# Patient Record
Sex: FEMALE | Race: WHITE | NOT HISPANIC OR LATINO | Employment: OTHER | URBAN - METROPOLITAN AREA
[De-identification: names, ages, dates, MRNs, and addresses within clinical notes are randomized per-mention and may not be internally consistent; named-entity substitution may affect disease eponyms.]

---

## 2024-02-27 ENCOUNTER — APPOINTMENT (EMERGENCY)
Dept: CT IMAGING | Facility: HOSPITAL | Age: 69
DRG: 394 | End: 2024-02-27
Payer: MEDICARE

## 2024-02-27 ENCOUNTER — HOSPITAL ENCOUNTER (INPATIENT)
Facility: HOSPITAL | Age: 69
LOS: 3 days | Discharge: HOME | DRG: 394 | End: 2024-03-01
Attending: STUDENT IN AN ORGANIZED HEALTH CARE EDUCATION/TRAINING PROGRAM | Admitting: SURGERY
Payer: MEDICARE

## 2024-02-27 DIAGNOSIS — I10 PRIMARY HYPERTENSION: Primary | ICD-10-CM

## 2024-02-27 DIAGNOSIS — E66.01 MORBID OBESITY (HCC): ICD-10-CM

## 2024-02-27 DIAGNOSIS — K56.609 SMALL BOWEL OBSTRUCTION (HCC): ICD-10-CM

## 2024-02-27 DIAGNOSIS — R10.9 ABDOMINAL PAIN: ICD-10-CM

## 2024-02-27 DIAGNOSIS — I48.0 PAROXYSMAL A-FIB (HCC): ICD-10-CM

## 2024-02-27 DIAGNOSIS — I10 HYPERTENSION, UNSPECIFIED TYPE: ICD-10-CM

## 2024-02-27 PROBLEM — K43.0 INCARCERATED INCISIONAL HERNIA: Status: ACTIVE | Noted: 2024-02-27

## 2024-02-27 LAB
ALBUMIN SERPL BCP-MCNC: 4.4 G/DL (ref 3.5–5)
ALP SERPL-CCNC: 114 U/L (ref 34–104)
ALT SERPL W P-5'-P-CCNC: 26 U/L (ref 7–52)
ANION GAP SERPL CALCULATED.3IONS-SCNC: 12 MMOL/L
AST SERPL W P-5'-P-CCNC: 25 U/L (ref 13–39)
BACTERIA UR QL AUTO: ABNORMAL /HPF
BASOPHILS # BLD AUTO: 0.03 THOUSANDS/ÂΜL (ref 0–0.1)
BASOPHILS NFR BLD AUTO: 0 % (ref 0–1)
BILIRUB SERPL-MCNC: 0.56 MG/DL (ref 0.2–1)
BILIRUB UR QL STRIP: NEGATIVE
BUN SERPL-MCNC: 16 MG/DL (ref 5–25)
CALCIUM SERPL-MCNC: 9.7 MG/DL (ref 8.4–10.2)
CHLORIDE SERPL-SCNC: 101 MMOL/L (ref 96–108)
CLARITY UR: CLEAR
CO2 SERPL-SCNC: 24 MMOL/L (ref 21–32)
COLOR UR: ABNORMAL
CREAT SERPL-MCNC: 0.84 MG/DL (ref 0.6–1.3)
EOSINOPHIL # BLD AUTO: 0 THOUSAND/ÂΜL (ref 0–0.61)
EOSINOPHIL NFR BLD AUTO: 0 % (ref 0–6)
ERYTHROCYTE [DISTWIDTH] IN BLOOD BY AUTOMATED COUNT: 12 % (ref 11.6–15.1)
GFR SERPL CREATININE-BSD FRML MDRD: 71 ML/MIN/1.73SQ M
GLUCOSE SERPL-MCNC: 180 MG/DL (ref 65–140)
GLUCOSE UR STRIP-MCNC: NEGATIVE MG/DL
HCT VFR BLD AUTO: 48.7 % (ref 34.8–46.1)
HGB BLD-MCNC: 16.6 G/DL (ref 11.5–15.4)
HGB UR QL STRIP.AUTO: ABNORMAL
IMM GRANULOCYTES # BLD AUTO: 0.07 THOUSAND/UL (ref 0–0.2)
IMM GRANULOCYTES NFR BLD AUTO: 1 % (ref 0–2)
KETONES UR STRIP-MCNC: NEGATIVE MG/DL
LEUKOCYTE ESTERASE UR QL STRIP: NEGATIVE
LIPASE SERPL-CCNC: 9 U/L (ref 11–82)
LYMPHOCYTES # BLD AUTO: 1.4 THOUSANDS/ÂΜL (ref 0.6–4.47)
LYMPHOCYTES NFR BLD AUTO: 11 % (ref 14–44)
MCH RBC QN AUTO: 31.1 PG (ref 26.8–34.3)
MCHC RBC AUTO-ENTMCNC: 34.1 G/DL (ref 31.4–37.4)
MCV RBC AUTO: 91 FL (ref 82–98)
MONOCYTES # BLD AUTO: 0.32 THOUSAND/ÂΜL (ref 0.17–1.22)
MONOCYTES NFR BLD AUTO: 3 % (ref 4–12)
MUCOUS THREADS UR QL AUTO: ABNORMAL
NEUTROPHILS # BLD AUTO: 10.59 THOUSANDS/ÂΜL (ref 1.85–7.62)
NEUTS SEG NFR BLD AUTO: 85 % (ref 43–75)
NITRITE UR QL STRIP: NEGATIVE
NON-SQ EPI CELLS URNS QL MICRO: ABNORMAL /HPF
NRBC BLD AUTO-RTO: 0 /100 WBCS
PH UR STRIP.AUTO: 7 [PH]
PLATELET # BLD AUTO: 254 THOUSANDS/UL (ref 149–390)
PMV BLD AUTO: 10.1 FL (ref 8.9–12.7)
POTASSIUM SERPL-SCNC: 4 MMOL/L (ref 3.5–5.3)
PROT SERPL-MCNC: 8.2 G/DL (ref 6.4–8.4)
PROT UR STRIP-MCNC: ABNORMAL MG/DL
RBC # BLD AUTO: 5.34 MILLION/UL (ref 3.81–5.12)
RBC #/AREA URNS AUTO: ABNORMAL /HPF
SODIUM SERPL-SCNC: 137 MMOL/L (ref 135–147)
SP GR UR STRIP.AUTO: >=1.05 (ref 1–1.03)
UROBILINOGEN UR STRIP-ACNC: <2 MG/DL
WBC # BLD AUTO: 12.41 THOUSAND/UL (ref 4.31–10.16)
WBC #/AREA URNS AUTO: ABNORMAL /HPF

## 2024-02-27 PROCEDURE — 96374 THER/PROPH/DIAG INJ IV PUSH: CPT

## 2024-02-27 PROCEDURE — 74177 CT ABD & PELVIS W/CONTRAST: CPT

## 2024-02-27 PROCEDURE — 84134 ASSAY OF PREALBUMIN: CPT | Performed by: SURGERY

## 2024-02-27 PROCEDURE — 99285 EMERGENCY DEPT VISIT HI MDM: CPT | Performed by: STUDENT IN AN ORGANIZED HEALTH CARE EDUCATION/TRAINING PROGRAM

## 2024-02-27 PROCEDURE — 96361 HYDRATE IV INFUSION ADD-ON: CPT

## 2024-02-27 PROCEDURE — 99284 EMERGENCY DEPT VISIT MOD MDM: CPT

## 2024-02-27 PROCEDURE — 85025 COMPLETE CBC W/AUTO DIFF WBC: CPT | Performed by: STUDENT IN AN ORGANIZED HEALTH CARE EDUCATION/TRAINING PROGRAM

## 2024-02-27 PROCEDURE — 80053 COMPREHEN METABOLIC PANEL: CPT | Performed by: STUDENT IN AN ORGANIZED HEALTH CARE EDUCATION/TRAINING PROGRAM

## 2024-02-27 PROCEDURE — 83690 ASSAY OF LIPASE: CPT | Performed by: STUDENT IN AN ORGANIZED HEALTH CARE EDUCATION/TRAINING PROGRAM

## 2024-02-27 PROCEDURE — 81001 URINALYSIS AUTO W/SCOPE: CPT | Performed by: STUDENT IN AN ORGANIZED HEALTH CARE EDUCATION/TRAINING PROGRAM

## 2024-02-27 PROCEDURE — 93005 ELECTROCARDIOGRAM TRACING: CPT

## 2024-02-27 PROCEDURE — 36415 COLL VENOUS BLD VENIPUNCTURE: CPT

## 2024-02-27 PROCEDURE — 83036 HEMOGLOBIN GLYCOSYLATED A1C: CPT | Performed by: SURGERY

## 2024-02-27 RX ORDER — ONDANSETRON 4 MG/1
4 TABLET, ORALLY DISINTEGRATING ORAL ONCE
Status: COMPLETED | OUTPATIENT
Start: 2024-02-27 | End: 2024-02-27

## 2024-02-27 RX ORDER — CEFAZOLIN SODIUM 2 G/50ML
2000 SOLUTION INTRAVENOUS EVERY 8 HOURS
Qty: 50 ML | Refills: 0 | Status: COMPLETED | OUTPATIENT
Start: 2024-02-28 | End: 2024-02-28

## 2024-02-27 RX ORDER — ONDANSETRON 2 MG/ML
4 INJECTION INTRAMUSCULAR; INTRAVENOUS EVERY 6 HOURS PRN
Status: DISCONTINUED | OUTPATIENT
Start: 2024-02-27 | End: 2024-03-01 | Stop reason: HOSPADM

## 2024-02-27 RX ORDER — MORPHINE SULFATE 4 MG/ML
4 INJECTION, SOLUTION INTRAMUSCULAR; INTRAVENOUS ONCE
Status: COMPLETED | OUTPATIENT
Start: 2024-02-27 | End: 2024-02-27

## 2024-02-27 RX ORDER — SODIUM CHLORIDE, SODIUM LACTATE, POTASSIUM CHLORIDE, CALCIUM CHLORIDE 600; 310; 30; 20 MG/100ML; MG/100ML; MG/100ML; MG/100ML
125 INJECTION, SOLUTION INTRAVENOUS CONTINUOUS
Status: DISCONTINUED | OUTPATIENT
Start: 2024-02-27 | End: 2024-02-29

## 2024-02-27 RX ORDER — HEPARIN SODIUM 5000 [USP'U]/ML
7500 INJECTION, SOLUTION INTRAVENOUS; SUBCUTANEOUS EVERY 8 HOURS SCHEDULED
Status: DISCONTINUED | OUTPATIENT
Start: 2024-02-28 | End: 2024-03-01 | Stop reason: HOSPADM

## 2024-02-27 RX ORDER — PANTOPRAZOLE SODIUM 40 MG/10ML
40 INJECTION, POWDER, LYOPHILIZED, FOR SOLUTION INTRAVENOUS
Status: DISCONTINUED | OUTPATIENT
Start: 2024-02-28 | End: 2024-03-01 | Stop reason: HOSPADM

## 2024-02-27 RX ADMIN — SODIUM CHLORIDE 1000 ML: 0.9 INJECTION, SOLUTION INTRAVENOUS at 22:15

## 2024-02-27 RX ADMIN — SODIUM CHLORIDE 1000 ML: 0.9 INJECTION, SOLUTION INTRAVENOUS at 18:17

## 2024-02-27 RX ADMIN — MORPHINE SULFATE 2 MG: 2 INJECTION, SOLUTION INTRAMUSCULAR; INTRAVENOUS at 21:52

## 2024-02-27 RX ADMIN — IOHEXOL 100 ML: 350 INJECTION, SOLUTION INTRAVENOUS at 19:03

## 2024-02-27 RX ADMIN — ONDANSETRON 4 MG: 4 TABLET, ORALLY DISINTEGRATING ORAL at 16:30

## 2024-02-27 RX ADMIN — MORPHINE SULFATE 4 MG: 4 INJECTION INTRAVENOUS at 18:16

## 2024-02-27 RX ADMIN — SODIUM CHLORIDE, SODIUM LACTATE, POTASSIUM CHLORIDE, AND CALCIUM CHLORIDE 125 ML/HR: .6; .31; .03; .02 INJECTION, SOLUTION INTRAVENOUS at 22:53

## 2024-02-28 PROBLEM — I10 HYPERTENSION: Status: ACTIVE | Noted: 2024-02-28

## 2024-02-28 LAB
ALBUMIN SERPL BCP-MCNC: 3.9 G/DL (ref 3.5–5)
ALP SERPL-CCNC: 99 U/L (ref 34–104)
ALT SERPL W P-5'-P-CCNC: 21 U/L (ref 7–52)
ANION GAP SERPL CALCULATED.3IONS-SCNC: 9 MMOL/L
AST SERPL W P-5'-P-CCNC: 18 U/L (ref 13–39)
BASOPHILS # BLD AUTO: 0 THOUSANDS/ÂΜL (ref 0–0.1)
BASOPHILS NFR BLD AUTO: 0 % (ref 0–1)
BILIRUB SERPL-MCNC: 0.46 MG/DL (ref 0.2–1)
BUN SERPL-MCNC: 15 MG/DL (ref 5–25)
CALCIUM SERPL-MCNC: 8.8 MG/DL (ref 8.4–10.2)
CHLORIDE SERPL-SCNC: 103 MMOL/L (ref 96–108)
CO2 SERPL-SCNC: 25 MMOL/L (ref 21–32)
CREAT SERPL-MCNC: 0.79 MG/DL (ref 0.6–1.3)
EOSINOPHIL # BLD AUTO: 0 THOUSAND/ÂΜL (ref 0–0.61)
EOSINOPHIL NFR BLD AUTO: 0 % (ref 0–6)
ERYTHROCYTE [DISTWIDTH] IN BLOOD BY AUTOMATED COUNT: 12.2 % (ref 11.6–15.1)
EST. AVERAGE GLUCOSE BLD GHB EST-MCNC: 117 MG/DL
GFR SERPL CREATININE-BSD FRML MDRD: 76 ML/MIN/1.73SQ M
GLUCOSE SERPL-MCNC: 137 MG/DL (ref 65–140)
HBA1C MFR BLD: 5.7 %
HCT VFR BLD AUTO: 44.8 % (ref 34.8–46.1)
HGB BLD-MCNC: 15.2 G/DL (ref 11.5–15.4)
IMM GRANULOCYTES # BLD AUTO: 0.04 THOUSAND/UL (ref 0–0.2)
IMM GRANULOCYTES NFR BLD AUTO: 0 % (ref 0–2)
LYMPHOCYTES # BLD AUTO: 1.14 THOUSANDS/ÂΜL (ref 0.6–4.47)
LYMPHOCYTES NFR BLD AUTO: 11 % (ref 14–44)
MAGNESIUM SERPL-MCNC: 2.1 MG/DL (ref 1.9–2.7)
MCH RBC QN AUTO: 31.1 PG (ref 26.8–34.3)
MCHC RBC AUTO-ENTMCNC: 33.9 G/DL (ref 31.4–37.4)
MCV RBC AUTO: 92 FL (ref 82–98)
MONOCYTES # BLD AUTO: 0.41 THOUSAND/ÂΜL (ref 0.17–1.22)
MONOCYTES NFR BLD AUTO: 4 % (ref 4–12)
NEUTROPHILS # BLD AUTO: 9.23 THOUSANDS/ÂΜL (ref 1.85–7.62)
NEUTS SEG NFR BLD AUTO: 85 % (ref 43–75)
NRBC BLD AUTO-RTO: 0 /100 WBCS
PHOSPHATE SERPL-MCNC: 4.2 MG/DL (ref 2.3–4.1)
PLATELET # BLD AUTO: 230 THOUSANDS/UL (ref 149–390)
PMV BLD AUTO: 10.2 FL (ref 8.9–12.7)
POTASSIUM SERPL-SCNC: 4.1 MMOL/L (ref 3.5–5.3)
PREALB SERPL-MCNC: 22.9 MG/DL (ref 17–34)
PROT SERPL-MCNC: 7.2 G/DL (ref 6.4–8.4)
RBC # BLD AUTO: 4.89 MILLION/UL (ref 3.81–5.12)
SODIUM SERPL-SCNC: 137 MMOL/L (ref 135–147)
WBC # BLD AUTO: 10.82 THOUSAND/UL (ref 4.31–10.16)

## 2024-02-28 PROCEDURE — 93005 ELECTROCARDIOGRAM TRACING: CPT

## 2024-02-28 PROCEDURE — 84100 ASSAY OF PHOSPHORUS: CPT | Performed by: SURGERY

## 2024-02-28 PROCEDURE — 80053 COMPREHEN METABOLIC PANEL: CPT | Performed by: SURGERY

## 2024-02-28 PROCEDURE — 87040 BLOOD CULTURE FOR BACTERIA: CPT | Performed by: FAMILY MEDICINE

## 2024-02-28 PROCEDURE — 85025 COMPLETE CBC W/AUTO DIFF WBC: CPT | Performed by: SURGERY

## 2024-02-28 PROCEDURE — 99222 1ST HOSP IP/OBS MODERATE 55: CPT | Performed by: FAMILY MEDICINE

## 2024-02-28 PROCEDURE — 83735 ASSAY OF MAGNESIUM: CPT | Performed by: SURGERY

## 2024-02-28 PROCEDURE — 36415 COLL VENOUS BLD VENIPUNCTURE: CPT | Performed by: FAMILY MEDICINE

## 2024-02-28 PROCEDURE — C9113 INJ PANTOPRAZOLE SODIUM, VIA: HCPCS | Performed by: SURGERY

## 2024-02-28 RX ORDER — HYDRALAZINE HYDROCHLORIDE 20 MG/ML
5 INJECTION INTRAMUSCULAR; INTRAVENOUS EVERY 6 HOURS PRN
Status: DISCONTINUED | OUTPATIENT
Start: 2024-02-28 | End: 2024-03-01 | Stop reason: HOSPADM

## 2024-02-28 RX ADMIN — PANTOPRAZOLE SODIUM 40 MG: 40 INJECTION, POWDER, FOR SOLUTION INTRAVENOUS at 09:28

## 2024-02-28 RX ADMIN — HEPARIN SODIUM 7500 UNITS: 5000 INJECTION INTRAVENOUS; SUBCUTANEOUS at 06:05

## 2024-02-28 RX ADMIN — HEPARIN SODIUM 7500 UNITS: 5000 INJECTION INTRAVENOUS; SUBCUTANEOUS at 15:48

## 2024-02-28 RX ADMIN — HEPARIN SODIUM 7500 UNITS: 5000 INJECTION INTRAVENOUS; SUBCUTANEOUS at 21:21

## 2024-02-28 RX ADMIN — ONDANSETRON 4 MG: 2 INJECTION INTRAMUSCULAR; INTRAVENOUS at 03:14

## 2024-02-28 RX ADMIN — CEFAZOLIN SODIUM 2000 MG: 2 SOLUTION INTRAVENOUS at 01:08

## 2024-02-28 NOTE — ASSESSMENT & PLAN NOTE
CT A/P: Small bowel obstruction involving multiple loops throughout the abdomen, extending into a large ventral abdominal hernia   Keep NPO  NG tube in place  Continuous IVF  Pain control, anti-emetics  Care per general surgery team

## 2024-02-28 NOTE — PLAN OF CARE
Problem: GASTROINTESTINAL - ADULT  Goal: Minimal or absence of nausea and/or vomiting  Description: INTERVENTIONS:  - Administer IV fluids if ordered to ensure adequate hydration  - Maintain NPO status until nausea and vomiting are resolved  - Nasogastric tube if ordered  - Administer ordered antiemetic medications as needed  - Provide nonpharmacologic comfort measures as appropriate  - Advance diet as tolerated, if ordered  - Consider nutrition services referral to assist patient with adequate nutrition and appropriate food choices  Outcome: Progressing  Goal: Maintains or returns to baseline bowel function  Description: INTERVENTIONS:  - Assess bowel function  - Encourage oral fluids to ensure adequate hydration  - Administer IV fluids if ordered to ensure adequate hydration  - Administer ordered medications as needed  - Encourage mobilization and activity  - Consider nutritional services referral to assist patient with adequate nutrition and appropriate food choices  Outcome: Progressing     Problem: HEMATOLOGIC - ADULT  Goal: Maintains hematologic stability  Description: INTERVENTIONS  - Assess for signs and symptoms of bleeding or hemorrhage  - Monitor labs  - Administer supportive blood products/factors as ordered and appropriate  Outcome: Progressing

## 2024-02-28 NOTE — ASSESSMENT & PLAN NOTE
CT A/P with SBO extending into large ventral hernia  Keep NPO  NG tube in place  S/P IV Ancef  Care per general surgery team

## 2024-02-28 NOTE — ASSESSMENT & PLAN NOTE
BP elevated 170s/90s  No history of hypertension; however, does not follow with a PCP  Start on IV hydralazine 5mg Q6H PRN for SBP>170 while NPO  Will start on oral agent at discharge if BP remains high  Referral for PCP at discharge  Monitor vitals per routine

## 2024-02-28 NOTE — NURSING NOTE
Patients has has 2 medium liquid/soft stools since coming from ED to [facility]. Patient is also passing gas

## 2024-02-28 NOTE — H&P
H&P Exam - General Surgery   Ivonne Marcos 68 y.o. female MRN: [id number]  Unit/Bed#: ED 22 Encounter: [id number]    Assessment/Plan     Assessment:  68-year-old female with large incarcerated ventral hernia    SBO    Hypertension    Morbid obesity    Abnormal HR    Plan:  -Continue n.p.o., IVF, NGT to low remittent wall suction  -Continue to monitor NGT output  -Continue to monitor bowel function  -Will consult cardiology for clearance and abnormal heart rate evaluation  -Given patient's obesity, it would be ideal to plan outpatient repair after weight loss and nutritional optimization      History of Present Illness     HPI:  Ivonne Marcos is a 68 y.o. female with morbid obesity, who presents to the emergency department with complaint of abdominal pain, nausea, vomiting.  Patient states that she has been having intermittent symptoms for the past several months.  Patient has known about ventral hernia that developed shortly after undergoing laparoscopic cholecystectomy 10 years ago.  Patient reports that the hernia to present for a long has only began causing issues recently, with this being the first time she needed to seek medical care.  Patient states that she has a very sedentary lifestyle, poor diet.  Patient reports that hernia fell harder than it typically does, but had no increased hernia tenderness.     Review of Systems   Constitutional:  Positive for appetite change. Negative for activity change, chills and fever.   HENT:  Negative for congestion, sore throat and trouble swallowing.    Eyes:  Negative for redness and visual disturbance.   Respiratory:  Negative for apnea, cough and wheezing.    Cardiovascular:  Negative for chest pain, palpitations and leg swelling.   Gastrointestinal:  Positive for abdominal pain, constipation, nausea and vomiting. Negative for abdominal distention.   Endocrine: Negative for polydipsia, polyphagia and polyuria.   Genitourinary:  Negative for difficulty urinating,  "dysuria, frequency and urgency.   Musculoskeletal:  Negative for arthralgias, back pain and gait problem.   Skin:  Negative for color change, pallor and rash.   Neurological:  Negative for dizziness, seizures, weakness and headaches.   Psychiatric/Behavioral:  Negative for agitation, behavioral problems and confusion.        Historical Information   History reviewed. No pertinent past medical history.  History reviewed. No pertinent surgical history.  Social History   Social History     Substance and Sexual Activity   Alcohol Use Not Currently     Social History     Substance and Sexual Activity   Drug Use Never     Social History     Tobacco Use   Smoking Status Never   Smokeless Tobacco Never     E-Cigarette/Vaping     E-Cigarette/Vaping Substances     Family History: non-contributory    Meds/Allergies   all medications and allergies reviewed  No Known Allergies    Objective   First Vitals:   Blood Pressure: (!) 193/87 (02/27/24 1624)  Pulse: (!) 113 (02/27/24 1624)  Temperature: 97.9 °F (36.6 °C) (02/27/24 1624)  Temp Source: Temporal (02/27/24 1624)  Respirations: 20 (02/27/24 1624)  Height: 5' 4\" (162.6 cm) (02/27/24 2251)  Weight - Scale: (!) 140 kg (308 lb 10.3 oz) (02/27/24 2251)  SpO2: 99 % (02/27/24 1624)    Current Vitals:   Blood Pressure: 158/78 (02/28/24 0930)  Pulse: 99 (02/28/24 0930)  Temperature: 97.9 °F (36.6 °C) (02/27/24 1624)  Temp Source: Temporal (02/27/24 1624)  Respirations: 18 (02/28/24 0930)  Height: 5' 4\" (162.6 cm) (02/27/24 2251)  Weight - Scale: (!) 140 kg (308 lb 10.3 oz) (02/27/24 2251)  SpO2: 96 % (02/28/24 0930)      Intake/Output Summary (Last 24 hours) at 2/28/2024 1042  Last data filed at 2/28/2024 0933  Gross per 24 hour   Intake 400 ml   Output 400 ml   Net 0 ml       Invasive Devices       Peripheral Intravenous Line  Duration             Peripheral IV 02/27/24 Left;Proximal;Ventral (anterior) Forearm <1 day              Drain  Duration             NG/OG/Enteral Tube " Nasogastric 16 Fr Right nare <1 day                    Physical Exam  Constitutional:       Appearance: She is obese.   HENT:      Head: Normocephalic.      Nose: Nose normal.      Mouth/Throat:      Mouth: Mucous membranes are moist.      Pharynx: Oropharynx is clear.   Eyes:      Extraocular Movements: Extraocular movements intact.      Pupils: Pupils are equal, round, and reactive to light.   Cardiovascular:      Rate and Rhythm: Tachycardia present. Rhythm irregular.   Pulmonary:      [place]: Pulmonary effort is normal.      Breath sounds: Normal breath sounds.   Abdominal:      General: Abdomen is flat. Bowel sounds are normal. There is distension.      Tenderness: There is no abdominal tenderness. There is no guarding.   Musculoskeletal:         General: No swelling or deformity. Normal range of motion.      Cervical back: Normal range of motion.      Right lower leg: No edema.   Skin:     General: Skin is warm.      Capillary Refill: Capillary refill takes less than 2 seconds.      Coloration: Skin is not jaundiced.      Findings: No bruising or lesion.   Neurological:      General: No focal deficit present.      Mental Status: She is alert and oriented to person, place, and time. Mental status is at baseline.   Psychiatric:         Mood and Affect: Mood normal.         Thought Content: Thought content normal.         Judgment: Judgment normal.         Lab Results: CBC:   Lab Results   Component Value Date    WBC 10.82 (H) 02/28/2024    HGB 15.2 02/28/2024    HCT 44.8 02/28/2024    MCV 92 02/28/2024     02/28/2024    RBC 4.89 02/28/2024    MCH 31.1 02/28/2024    MCHC 33.9 02/28/2024    RDW 12.2 02/28/2024    MPV 10.2 02/28/2024    NRBC 0 02/28/2024   , CMP:   Lab Results   Component Value Date    SODIUM 137 02/28/2024    K 4.1 02/28/2024     02/28/2024    CO2 25 02/28/2024    BUN 15 02/28/2024    CREATININE 0.79 02/28/2024    CALCIUM 8.8 02/28/2024    AST 18 02/28/2024    ALT 21 02/28/2024     ALKPHOS 99 02/28/2024    EGFR 76 02/28/2024   , Lipase:   Lab Results   Component Value Date    LIPASE 9 (L) 02/27/2024     Imaging: I have personally reviewed pertinent reports.   and I have personally reviewed pertinent films in PACS  EKG, Pathology, and Other Studies: I have personally reviewed pertinent reports.   and I have personally reviewed pertinent films in PACS    Code Status: Level 1 - Full Code  Advance Directive and Living Will:      Power of :    POLST:      Counseling / Coordination of Care  Total floor / unit time spent today 30 minutes.  Greater than 50% of total time was spent with the patient and / or family counseling and / or coordination of care.  A description of the counseling / coordination of care: Plan of care and expectations.

## 2024-02-28 NOTE — CONSULTS
[facility]  Consult  Name: Ivonne Marcos 68 y.o. female I MRN: [id number]  Unit/Bed#: ED 22 I Date of Admission: 2/27/2024   Date of Service: 2/28/2024 I Hospital Day: 1    Inpatient consult to Internal Medicine  Consult performed by: Nahomy Ellsworth PA-C  Consult ordered by: Jesus Gallo MD          Assessment/Plan   * Incarcerated incisional hernia  Assessment & Plan  CT A/P with SBO extending into large ventral hernia  Keep NPO  NG tube in place  S/P IV Ancef  Care per general surgery team    Small bowel obstruction (HCC)  Assessment & Plan  CT A/P: Small bowel obstruction involving multiple loops throughout the abdomen, extending into a large ventral abdominal hernia   Keep NPO  NG tube in place  Continuous IVF  Pain control, anti-emetics  Care per general surgery team    Hypertension  Assessment & Plan  BP elevated 170s/90s  No history of hypertension; however, does not follow with a PCP  Start on IV hydralazine 5mg Q6H PRN for SBP>170 while NPO  Will start on oral agent at discharge if BP remains high  Referral for PCP at discharge  Monitor vitals per routine    Morbid obesity (HCC)  Assessment & Plan  Current BMI 52.98 kg/m2  Encouraged healthy diet and lifestyle modifications             VTE Prophylaxis: VTE Score: 5 Moderate Risk (Score 3-4) - Pharmacological DVT Prophylaxis Ordered: heparin.    Mobility:      HLM Goal achieved. Continue to encourage appropriate mobility.    Recommendations for Discharge:  Patient with elevated BP during admission. Recommending oral antihypertensive agent and referral for PCP.   Remainder of care per general surgery team    Total Time Spent on Date of Encounter in care of patient: 45 mins. This time was spent on one or more of the following: performing physical exam; counseling and coordination of care; obtaining or reviewing history; documenting in the medical record; reviewing/ordering tests, medications or procedures; communicating with  other healthcare professionals and discussing with patient's family/caregivers.    Collaboration of Care: Were Recommendations Directly Discussed with Primary Treatment Team? Yes    History of Present Illness:  Ivonne Marcos is a 68 y.o. female who is originally admitted to the general surgery service due to SBO and incarcerated hernia. We are consulted for medical management. Patient with no past medical history; however, she does not follow with a PCP. Patient noted to have elevated BP during admission. Patient also with obesity secondary to excessive calorie intake. Patient presented to the ED for abdominal pain with nausea and vomiting. CT with evidence of SBO and hernia and she was admitted to general surgery service. Internal medicine will continue to follow with patient.     Review of Systems:  Review of Systems   Constitutional:  Negative for chills and fever.   HENT:  Negative for ear pain and sore throat.    Eyes:  Negative for pain and visual disturbance.   Respiratory:  Negative for cough and shortness of breath.    Cardiovascular:  Negative for chest pain and palpitations.   Gastrointestinal:  Positive for abdominal pain, nausea and vomiting.   Genitourinary:  Negative for dysuria and hematuria.   Musculoskeletal:  Negative for arthralgias and back pain.   Skin:  Negative for color change and rash.   Neurological:  Negative for seizures and syncope.   All other systems reviewed and are negative.      Past Medical and Surgical History:   History reviewed. No pertinent past medical history.    History reviewed. No pertinent surgical history.    Meds/Allergies:  all medications and allergies reviewed    Allergies: No Known Allergies    Social History:  Marital Status: /[employer]  Substance Use History:   Social History     Substance and Sexual Activity   Alcohol Use Not Currently     Social History     Tobacco Use   Smoking Status Never   Smokeless Tobacco Never     Social History     Substance  "and Sexual Activity   Drug Use Never       Family History:  History reviewed. No pertinent family history.    Physical Exam:   Vitals:   Blood Pressure: (!) 172/79 (02/28/24 0800)  Pulse: 95 (02/28/24 0800)  Temperature: 97.9 °F (36.6 °C) (02/27/24 1624)  Temp Source: Temporal (02/27/24 1624)  Respirations: 18 (02/28/24 0800)  Height: 5' 4\" (162.6 cm) (02/27/24 2251)  Weight - Scale: (!) 140 kg (308 lb 10.3 oz) (02/27/24 2251)  SpO2: 95 % (02/28/24 0800)    Physical Exam  Vitals and nursing note reviewed.   Constitutional:       General: She is not in acute distress.     Appearance: She is well-developed. She is obese.   HENT:      Head: Normocephalic and atraumatic.   Eyes:      Conjunctiva/sclera: Conjunctivae normal.   Cardiovascular:      Rate and Rhythm: Normal rate and regular rhythm.      Heart sounds: No murmur heard.  Pulmonary:      [place]: Pulmonary effort is normal. No respiratory distress.      Breath sounds: Normal breath sounds.   Abdominal:      Palpations: Abdomen is soft.      Tenderness: There is no abdominal tenderness.   Musculoskeletal:         General: No swelling.      Cervical back: Neck supple.   Skin:     General: Skin is warm and dry.      Capillary Refill: Capillary refill takes less than 2 seconds.   Neurological:      Mental Status: She is alert and oriented to person, place, and time.   Psychiatric:         Mood and Affect: Mood normal.          Additional Data:   Lab Results:    Results from last 7 days   Lab Units 02/28/24  0452   WBC Thousand/uL 10.82*   HEMOGLOBIN g/dL 15.2   HEMATOCRIT % 44.8   PLATELETS Thousands/uL 230   NEUTROS PCT % 85*   LYMPHS PCT % 11*   MONOS PCT % 4   EOS PCT % 0     Results from last 7 days   Lab Units 02/28/24  0452   SODIUM mmol/L 137   POTASSIUM mmol/L 4.1   CHLORIDE mmol/L 103   CO2 mmol/L 25   BUN mg/dL 15   CREATININE mg/dL 0.79   ANION GAP mmol/L 9   CALCIUM mg/dL 8.8   ALBUMIN g/dL 3.9   TOTAL BILIRUBIN mg/dL 0.46   ALK PHOS U/L 99   ALT U/L 21 "   AST U/L 18   GLUCOSE RANDOM mg/dL 137             Lab Results   Component Value Date/Time    HGBA1C 5.7 (H) 02/27/2024 09:53 PM               Imaging: Reviewed radiology reports from this admission including: abdominal/pelvic CT  CT abdomen pelvis with contrast   Final Result by Archie Moses DO (02/27 2043)      Small bowel obstruction involving multiple loops throughout the abdomen, extending into a large ventral abdominal hernia. The transition point is located within the hernia sac.         I personally discussed this study with PEEWEE FIELD on 2/27/2024 8:43 PM.               Workstation performed: [employer]             EKG, Pathology, and Other Studies Reviewed on Admission:   EKG interpreted by myself: NSR () with PACs.     ** Please Note: This note may have been constructed using a voice recognition system. **

## 2024-02-29 ENCOUNTER — APPOINTMENT (INPATIENT)
Dept: NON INVASIVE DIAGNOSTICS | Facility: HOSPITAL | Age: 69
DRG: 394 | End: 2024-02-29
Payer: MEDICARE

## 2024-02-29 LAB
ANION GAP SERPL CALCULATED.3IONS-SCNC: 8 MMOL/L
BASOPHILS # BLD AUTO: 0.02 THOUSANDS/ÂΜL (ref 0–0.1)
BASOPHILS NFR BLD AUTO: 0 % (ref 0–1)
BUN SERPL-MCNC: 20 MG/DL (ref 5–25)
CALCIUM SERPL-MCNC: 8.7 MG/DL (ref 8.4–10.2)
CHLORIDE SERPL-SCNC: 108 MMOL/L (ref 96–108)
CO2 SERPL-SCNC: 22 MMOL/L (ref 21–32)
CREAT SERPL-MCNC: 0.78 MG/DL (ref 0.6–1.3)
EOSINOPHIL # BLD AUTO: 0.02 THOUSAND/ÂΜL (ref 0–0.61)
EOSINOPHIL NFR BLD AUTO: 0 % (ref 0–6)
ERYTHROCYTE [DISTWIDTH] IN BLOOD BY AUTOMATED COUNT: 12.4 % (ref 11.6–15.1)
GFR SERPL CREATININE-BSD FRML MDRD: 77 ML/MIN/1.73SQ M
GLUCOSE SERPL-MCNC: 108 MG/DL (ref 65–140)
HCT VFR BLD AUTO: 43.5 % (ref 34.8–46.1)
HGB BLD-MCNC: 14.6 G/DL (ref 11.5–15.4)
IMM GRANULOCYTES # BLD AUTO: 0.03 THOUSAND/UL (ref 0–0.2)
IMM GRANULOCYTES NFR BLD AUTO: 0 % (ref 0–2)
LYMPHOCYTES # BLD AUTO: 1.87 THOUSANDS/ÂΜL (ref 0.6–4.47)
LYMPHOCYTES NFR BLD AUTO: 20 % (ref 14–44)
MCH RBC QN AUTO: 31 PG (ref 26.8–34.3)
MCHC RBC AUTO-ENTMCNC: 33.6 G/DL (ref 31.4–37.4)
MCV RBC AUTO: 92 FL (ref 82–98)
MONOCYTES # BLD AUTO: 0.57 THOUSAND/ÂΜL (ref 0.17–1.22)
MONOCYTES NFR BLD AUTO: 6 % (ref 4–12)
NEUTROPHILS # BLD AUTO: 7.07 THOUSANDS/ÂΜL (ref 1.85–7.62)
NEUTS SEG NFR BLD AUTO: 74 % (ref 43–75)
NRBC BLD AUTO-RTO: 0 /100 WBCS
PLATELET # BLD AUTO: 184 THOUSANDS/UL (ref 149–390)
PMV BLD AUTO: 11.1 FL (ref 8.9–12.7)
POTASSIUM SERPL-SCNC: 3.6 MMOL/L (ref 3.5–5.3)
RBC # BLD AUTO: 4.71 MILLION/UL (ref 3.81–5.12)
SODIUM SERPL-SCNC: 138 MMOL/L (ref 135–147)
WBC # BLD AUTO: 9.58 THOUSAND/UL (ref 4.31–10.16)

## 2024-02-29 PROCEDURE — 99222 1ST HOSP IP/OBS MODERATE 55: CPT | Performed by: INTERNAL MEDICINE

## 2024-02-29 PROCEDURE — 80048 BASIC METABOLIC PNL TOTAL CA: CPT | Performed by: PHYSICIAN ASSISTANT

## 2024-02-29 PROCEDURE — C9113 INJ PANTOPRAZOLE SODIUM, VIA: HCPCS | Performed by: SURGERY

## 2024-02-29 PROCEDURE — 93306 TTE W/DOPPLER COMPLETE: CPT | Performed by: INTERNAL MEDICINE

## 2024-02-29 PROCEDURE — 93306 TTE W/DOPPLER COMPLETE: CPT

## 2024-02-29 PROCEDURE — 99232 SBSQ HOSP IP/OBS MODERATE 35: CPT | Performed by: INTERNAL MEDICINE

## 2024-02-29 PROCEDURE — 85025 COMPLETE CBC W/AUTO DIFF WBC: CPT | Performed by: PHYSICIAN ASSISTANT

## 2024-02-29 RX ORDER — SODIUM CHLORIDE, SODIUM LACTATE, POTASSIUM CHLORIDE, CALCIUM CHLORIDE 600; 310; 30; 20 MG/100ML; MG/100ML; MG/100ML; MG/100ML
75 INJECTION, SOLUTION INTRAVENOUS CONTINUOUS
Status: DISCONTINUED | OUTPATIENT
Start: 2024-02-29 | End: 2024-03-01 | Stop reason: HOSPADM

## 2024-02-29 RX ADMIN — SODIUM CHLORIDE, SODIUM LACTATE, POTASSIUM CHLORIDE, AND CALCIUM CHLORIDE 125 ML/HR: .6; .31; .03; .02 INJECTION, SOLUTION INTRAVENOUS at 04:07

## 2024-02-29 RX ADMIN — HEPARIN SODIUM 7500 UNITS: 5000 INJECTION INTRAVENOUS; SUBCUTANEOUS at 13:11

## 2024-02-29 RX ADMIN — PANTOPRAZOLE SODIUM 40 MG: 40 INJECTION, POWDER, FOR SOLUTION INTRAVENOUS at 11:22

## 2024-02-29 RX ADMIN — HEPARIN SODIUM 7500 UNITS: 5000 INJECTION INTRAVENOUS; SUBCUTANEOUS at 06:37

## 2024-02-29 NOTE — CASE MANAGEMENT
Case Management Assessment & Discharge Planning Note    Patient name Ivonne Marcos  Location /-01 MRN [id number]  : 1955 Date 2024       Current Admission Date: 2024  Current Admission Diagnosis:Incarcerated incisional hernia   Patient Active Problem List    Diagnosis Date Noted    Hypertension 2024    Small bowel obstruction (HCC) 2024    Incarcerated incisional hernia 2024    Morbid obesity (HCC) 2024      LOS (days): 2  Geometric Mean LOS (GMLOS) (days): 3  Days to GMLOS:1.3     OBJECTIVE:    Risk of Unplanned Readmission Score: 8.22         Current admission status: Inpatient       Preferred Pharmacy:   PATIENT/FAMILY REPORTS NO PREFERRED PHARMACY  No address on file      Primary Care Provider: No primary care provider on file.    Primary Insurance: MEDICARE  Secondary Insurance:     ASSESSMENT:  Active Health Care Proxies    There are no active Health Care Proxies on file.                 Readmission Root Cause  30 Day Readmission: No    Patient Information  Admitted from:: Home  Mental Status: Alert  During Assessment patient was accompanied by: Spouse  Assessment information provided by:: Patient, Spouse  Primary Caregiver: Self  Support Systems: Family members  County of Residence: [place]  What city do you live in?: [place]  Home entry access options. Select all that apply.: Stairs  Number of steps to enter home.: 3  Do the steps have railings?: Yes  Type of Current Residence: [facility]  Living Arrangements: Lives w/ Spouse/significant other  Is patient a ?: No ( is )  Is patient active with [facility] ( Affairs)?: No    Activities of Daily Living Prior to Admission  Functional Status: Independent  Completes ADLs independently?: Yes  Ambulates independently?: Yes  Does patient use assisted devices?: No  Does patient currently own DME?: Yes  What DME does the patient currently own?: [employer]  Does patient have a history of  Outpatient Therapy (PT/OT)?: No  Does the patient have a history of Short-Term Rehab?: No  Does patient have a history of HHC?: No  Does patient currently have HHC?: No         Patient Information Continued  Income Source: Pension/intermediate  Does patient have prescription coverage?: Yes  Does patient receive dialysis treatments?: No  Does patient have a history of substance abuse?: No  Does patient have a history of Mental Health Diagnosis?: No    PHQ 2/9 Screening   Reviewed PHQ 2/9 Depression Screening Score?: No    Means of Transportation  Means of Transport to Appts:: Drives Self      Social Determinants of Health (SDOH)      Flowsheet Row Most Recent Value   Housing Stability    In the last 12 months, was there a time when you were not able to pay the mortgage or rent on time? N   In the last 12 months, how many places have you lived? 1   In the last 12 months, was there a time when you did not have a steady place to sleep or slept in a shelter (including now)? N   Transportation Needs    In the past 12 months, has lack of transportation kept you from medical appointments or from getting medications? no   In the past 12 months, has lack of transportation kept you from meetings, work, or from getting things needed for daily living? No   Food Insecurity    Within the past 12 months, you worried that your food would run out before you got the money to buy more. Never true   Within the past 12 months, the food you bought just didn't last and you didn't have money to get more. Never true   Utilities    In the past 12 months has the electric, gas, oil, or water company threatened to shut off services in your home? No            DISCHARGE DETAILS:    Discharge planning discussed with:: Patient & spouse at the bedside  Freedom of Choice: Yes  Comments - Freedom of Choice: FOC maintained in discussion regarding discharge planning. Patient is alert oriented and competent to make decisions. spouse with patient at the  bedside. Introduced self and role, explained role of CM in arranging services such as DME, STR, HHC, and providing community resource information. Discussed current living situation and needs at discharge. Patient is IPTA. CM offered HHC, STR, DME, PCP, OP CM, community resources. Confirmed pharmacy patient will be using- [facility] [place]. States will be making appointment for Dr Eddie Barnes, who her  sees for primary care, once she leaves the hospital. Patient declined CM resources at this time. Does not use DME. Pt is aware and encouraged to seek CM for any questions or concerns. CM continues to follow.  CM contacted family/caregiver?: Yes  Were Treatment Team discharge recommendations reviewed with patient/caregiver?: Yes  Did patient/caregiver verbalize understanding of patient care needs?: Yes  Were patient/caregiver advised of the risks associated with not following Treatment Team discharge recommendations?: Yes    Contacts  Patient Contacts: Ace Marcos (Spouse)  [phone] (Mobile)  Relationship to Patient:: Family  Contact Method: In Person  Reason/Outcome: Continuity of Care, Emergency Contact, Discharge Planning    Requested Home Health Care         Is the patient interested in HHC at discharge?: No    DME Referral Provided  Referral made for DME?: No    Other Referral/Resources/Interventions Provided:  Interventions: Declines resources  Referral Comments: CM will continue to follow for needs.    Would you like to participate in our [facility] service program?  : No - Declined       Discharge Destination Plan:: Home  Transport at Discharge : Family

## 2024-02-29 NOTE — ASSESSMENT & PLAN NOTE
BP improving today, 139/62  No history of hypertension; however, does not follow with a PCP  Start on IV hydralazine 5mg Q6H PRN for SBP>170 while NPO  Will start on oral agent at discharge if BP remains high  Referral for PCP at discharge  Monitor vitals per routine

## 2024-02-29 NOTE — PROGRESS NOTES
[facility]  Progress Note  Name: Ivonne Marcos I  MRN: [id number]  Unit/Bed#: -01 I Date of Admission: 2/27/2024   Date of Service: 2/29/2024 I Hospital Day: 2    Assessment/Plan   * Incarcerated incisional hernia  Assessment & Plan  CT A/P with SBO extending into large ventral hernia  Keep NPO  NG tube in place  S/P IV Ancef  Care per general surgery team    Small bowel obstruction (HCC)  Assessment & Plan  CT A/P: Small bowel obstruction involving multiple loops throughout the abdomen, extending into a large ventral abdominal hernia   Keep NPO  NG tube in place  Continuous IVF  Pain control, anti-emetics  Care per general surgery team    Hypertension  Assessment & Plan  BP improving today, 139/62  No history of hypertension; however, does not follow with a PCP  Start on IV hydralazine 5mg Q6H PRN for SBP>170 while NPO  Will start on oral agent at discharge if BP remains high  Referral for PCP at discharge  Monitor vitals per routine    Morbid obesity (HCC)  Assessment & Plan  Current BMI 52.98 kg/m2  Encouraged healthy diet and lifestyle modifications             VTE Pharmacologic Prophylaxis: VTE Score: 5 Moderate Risk (Score 3-4) - Pharmacological DVT Prophylaxis Ordered: heparin.    Mobility:   Basic Mobility Inpatient Raw Score: 24  JH-HLM Goal: 8: Walk 250 feet or more  JH-HLM Achieved: 7: Walk 25 feet or more  HLM Goal achieved. Continue to encourage appropriate mobility.    Patient Centered Rounds: I performed bedside rounds with nursing staff today.   Discussions with Specialists or Other Care Team Provider: Nursing    Education and Discussions with Family / Patient:  Family to be updated by general surgery.     Total Time Spent on Date of Encounter in care of patient: 45 mins. This time was spent on one or more of the following: performing physical exam; counseling and coordination of care; obtaining or reviewing history; documenting in the medical record;  reviewing/ordering tests, medications or procedures; communicating with other healthcare professionals and discussing with patient's family/caregivers.    Current Length of Stay: 2 day(s)  Current Patient Status: Inpatient   Certification Statement: The patient will continue to require additional inpatient hospital stay due to SBO  Discharge Plan: [facility] is following this patient on consult. They are medically stable for discharge when deemed appropriate by primary service.    Code Status: Level 1 - Full Code    Subjective:   Patient reports improvement in her abdominal pain today    Objective:     Vitals:   Temp (24hrs), Av.8 °F (36 °C), Min:96.8 °F (36 °C), Max:96.8 °F (36 °C)    Temp:  [96.8 °F (36 °C)] 96.8 °F (36 °C)  HR:  [] 99  Resp:  [18] 18  BP: (139-166)/(62-95) 139/62  SpO2:  [92 %-95 %] 92 %  Body mass index is 51.24 kg/m².     Input and Output Summary (last 24 hours):     Intake/Output Summary (Last 24 hours) at 2024 0944  Last data filed at 2024 0800  Gross per 24 hour   Intake 2000 ml   Output --   Net 2000 ml       Physical Exam:   Physical Exam  Vitals and nursing note reviewed.   Constitutional:       General: She is not in acute distress.     Appearance: She is well-developed. She is obese.   HENT:      Head: Normocephalic and atraumatic.   Eyes:      Conjunctiva/sclera: Conjunctivae normal.   Cardiovascular:      Rate and Rhythm: Normal rate and regular rhythm.      Heart sounds: No murmur heard.  Pulmonary:      Effort: Pulmonary effort is normal. No respiratory distress.      Breath sounds: Normal breath sounds.   Abdominal:      Palpations: Abdomen is soft.      Tenderness: There is no abdominal tenderness.   Musculoskeletal:         General: No swelling.      Cervical back: Neck supple.   Skin:     General: Skin is warm and dry.      Capillary Refill: Capillary refill takes less than 2 seconds.   Neurological:      Mental Status: She is alert. Mental status is at baseline.    Psychiatric:         Mood and Affect: Mood normal.          Additional Data:     Labs:  Results from last 7 days   Lab Units 02/28/24  0452   WBC Thousand/uL 10.82*   HEMOGLOBIN g/dL 15.2   HEMATOCRIT % 44.8   PLATELETS Thousands/uL 230   NEUTROS PCT % 85*   LYMPHS PCT % 11*   MONOS PCT % 4   EOS PCT % 0     Results from last 7 days   Lab Units 02/29/24  0718 02/28/24  0452   SODIUM mmol/L 138 137   POTASSIUM mmol/L 3.6 4.1   CHLORIDE mmol/L 108 103   CO2 mmol/L 22 25   BUN mg/dL 20 15   CREATININE mg/dL 0.78 0.79   ANION GAP mmol/L 8 9   CALCIUM mg/dL 8.7 8.8   ALBUMIN g/dL  --  3.9   TOTAL BILIRUBIN mg/dL  --  0.46   ALK PHOS U/L  --  99   ALT U/L  --  21   AST U/L  --  18   GLUCOSE RANDOM mg/dL 108 137             Results from last 7 days   Lab Units 02/27/24  2153   HEMOGLOBIN A1C % 5.7*           Lines/Drains:  Invasive Devices       Peripheral Intravenous Line  Duration             Peripheral IV 02/27/24 Left;Proximal;Ventral (anterior) Forearm 1 day              Drain  Duration             NG/OG/Enteral Tube Nasogastric 16 Fr Right nare 1 day                          Imaging: No pertinent imaging reviewed.    Recent Cultures (last 7 days):   Results from last 7 days   Lab Units 02/28/24  0105   BLOOD CULTURE  No Growth at 24 hrs.  No Growth at 24 hrs.       Last 24 Hours Medication List:   Current Facility-Administered Medications   Medication Dose Route Frequency Provider Last Rate    heparin (porcine)  7,500 Units Subcutaneous Q8H RANDALL Gallo MD      hydrALAZINE  5 mg Intravenous Q6H PRN Nahomy Ellsworth PA-C      lactated ringers  125 mL/hr Intravenous Continuous Jesus Gallo  mL/hr (02/29/24 0407)    morphine injection  2 mg Intravenous Q2H PRN Jesus Gallo MD      ondansetron  4 mg Intravenous Q6H PRN Jesus Gallo MD      pantoprazole  40 mg Intravenous Q24H ARNDALL Gallo MD          Today, Patient Was Seen By: Nahomy Ellsworth PA-C    **Please Note: This note may have been  constructed using a voice recognition system.**

## 2024-02-29 NOTE — CONSULTS
Consultation - Cardiology   Ivonne Marcos 68 y.o. female MRN: [id number]  Unit/Bed#: -01 Encounter: [id number]  02/29/24  4:32 PM    Assessment/ Plan:  1.  SBO secondary to large incarcerated ventral hernia    2.  Preoperative cardiovascular risk stratification  Denies chest pain or anginal equivalent.  EKG shows sinus rhythm without acute ischemic abnormalities.  Denies previous history of cardiac disease.  EKG without acute ischemic abnormalities.  TTE pending for further evaluation.    3.  Hypertensive urgency  BP is now well-controlled without antihypertensives, continue to monitor.    4.  Morbid obesity, BMI 51.09        History of Present Illness   Physician Requesting Consult: Jesus Gallo MD    Reason for Consult / Principal Problem: Preoperative cardiovascular risk stratification    HPI: Ivonne Marcos is a 68 y.o. year old female with history of obesity who presents with abdominal pain, nausea, vomiting.  Patient found to have evidence of SBO secondary to large incarcerated ventral hernia.  Blood pressure was also noted to be significantly elevated upon admission.  Cardiology consulted for further evaluation management.  Patient reportedly told by someone upon admission that she may have A-fib, however unclear as to why this is.  No definitive evidence of such found.  EKG shows sinus rhythm.  Patient denies any history of cardiac disease including atrial fibrillation.  Also denies chest pain, SOB, palpitations, LE edema, or syncope.      Inpatient consult to Cardiology  Consult performed by: Sarwat Espinoza PA-C  Consult ordered by: Smith Cunningham PA-C          EKG: Sinus rhythm with premature supraventricular complexes      Review of Systems   Constitutional:  Negative for chills and fever.   HENT:  Negative for ear pain and sore throat.    Eyes:  Negative for pain and visual disturbance.   Respiratory:  Negative for cough and shortness of breath.    Cardiovascular:  Negative for chest  "pain, palpitations and leg swelling.   Gastrointestinal:  Positive for abdominal pain, nausea and vomiting.   Genitourinary:  Negative for dysuria and hematuria.   Musculoskeletal:  Negative for arthralgias and back pain.   Skin:  Negative for color change and rash.   Neurological:  Negative for seizures and syncope.   All other systems reviewed and are negative.      Historical Information   History reviewed. No pertinent past medical history.  History reviewed. No pertinent surgical history.  Social History     Substance and Sexual Activity   Alcohol Use Not Currently     Social History     Substance and Sexual Activity   Drug Use Never     Social History     Tobacco Use   Smoking Status Never   Smokeless Tobacco Never       Family History: History reviewed. No pertinent family history.    Meds/Allergies   all current active meds have been reviewed  No Known Allergies    Objective   Vitals: Blood pressure 127/60, pulse 92, temperature 98.9 °F (37.2 °C), resp. rate 18, height 5' 4\" (1.626 m), weight 135 kg (297 lb 9.9 oz), SpO2 95%., Body mass index is 51.09 kg/m².,   Orthostatic Blood Pressures      Flowsheet Row Most Recent Value   Blood Pressure 127/60 filed at 2024 1543   Patient Position - Orthostatic VS Sitting filed at 2024 1300            Systolic (24hrs), Av , Min:127 , Max:143     Diastolic (24hrs), Av, Min:60, Max:77        Intake/Output Summary (Last 24 hours) at 2024 1632  Last data filed at 2024 1200  Gross per 24 hour   Intake 2000 ml   Output --   Net 2000 ml       Invasive Devices       Peripheral Intravenous Line  Duration             Peripheral IV 24 Left;Proximal;Ventral (anterior) Forearm 1 day              Drain  Duration             NG/OG/Enteral Tube Nasogastric 16 Fr Right nare 1 day                        Physical Exam:  GEN: Alert and oriented x 3, in no acute distress.  Well appearing and well nourished.   HEENT: Sclera anicteric, conjunctivae pink, " mucous membranes moist. Oropharynx clear.   NECK: Supple, no carotid bruits, no significant JVD. Trachea midline, no thyromegaly.   HEART: Regular rhythm, normal S1 and S2, no murmurs, clicks, gallops or rubs. PMI nondisplaced, no thrills.   LUNGS: Clear to auscultation bilaterally; no wheezes, rales, or rhonchi. No increased work of breathing or signs of respiratory distress.   ABDOMEN: Soft, nontender, nondistended, normoactive bowel sounds.   EXTREMITIES: Skin warm and well perfused, no clubbing, cyanosis, or edema.  NEURO: No focal findings. Normal speech. Mood and affect normal.   SKIN: Normal without suspicious lesions on exposed skin.    Lab Results:     Cardiac Profile:       CBC with diff:   Results from last 7 days   Lab Units 02/29/24  1116 02/28/24  0452 02/27/24  1634   WBC Thousand/uL 9.58 10.82* 12.41*   HEMOGLOBIN g/dL 14.6 15.2 16.6*   HEMATOCRIT % 43.5 44.8 48.7*   MCV fL 92 92 91   PLATELETS Thousands/uL 184 230 254   RBC Million/uL 4.71 4.89 5.34*   MCH pg 31.0 31.1 31.1   MCHC g/dL 33.6 33.9 34.1   RDW % 12.4 12.2 12.0   MPV fL 11.1 10.2 10.1   NRBC AUTO /100 WBCs 0 0 0         CMP:   Results from last 7 days   Lab Units 02/29/24  0718 02/28/24  0452 02/27/24  1634   POTASSIUM mmol/L 3.6 4.1 4.0   CHLORIDE mmol/L 108 103 101   CO2 mmol/L 22 25 24   BUN mg/dL 20 15 16   CREATININE mg/dL 0.78 0.79 0.84   CALCIUM mg/dL 8.7 8.8 9.7   AST U/L  --  18 25   ALT U/L  --  21 26   ALK PHOS U/L  --  99 114*   EGFR ml/min/1.73sq m 77 76 71

## 2024-02-29 NOTE — PLAN OF CARE
Problem: GASTROINTESTINAL - ADULT  Goal: Minimal or absence of nausea and/or vomiting  Description: INTERVENTIONS:  - Administer IV fluids if ordered to ensure adequate hydration  - Maintain NPO status until nausea and vomiting are resolved  - Nasogastric tube if ordered  - Administer ordered antiemetic medications as needed  - Provide nonpharmacologic comfort measures as appropriate  - Advance diet as tolerated, if ordered  - Consider nutrition services referral to assist patient with adequate nutrition and appropriate food choices  Outcome: Progressing  Goal: Maintains or returns to baseline bowel function  Description: INTERVENTIONS:  - Assess bowel function  - Encourage oral fluids to ensure adequate hydration  - Administer IV fluids if ordered to ensure adequate hydration  - Administer ordered medications as needed  - Encourage mobilization and activity  - Consider nutritional services referral to assist patient with adequate nutrition and appropriate food choices  Outcome: Progressing

## 2024-02-29 NOTE — PROGRESS NOTES
"Progress Note - General Surgery   Ivonne Marcos 68 y.o. female MRN: [id number]  Unit/Bed#: -01 Encounter: [id number]    Assessment:  Ivonne Marcos is a 68 y.o. female with SBO secondary to large incarcerated ventral hernia    AVSS, a.m. labs pending  NG tube with minimal clear yellow output  Patient denies any nausea or vomiting.  Abdominal pain resolved.  Reports 3 loose bowel movements and passing flatus.  Abdominal distention improving.    Plan:  Clamp trial NG tube  N.p.o.  IV fluids  Monitor abdominal exam, labs, vitals  Cardiology consult placed  PRN pain medication and anti-emetics  Encourage ambulation  DVT ppx: heparin  Incentive spirometry 10 times/hour while awake  Continue home medications as prescribed   General surgery is primary.  [place] text with any questions or concerns.    Subjective/Objective    Subjective: No acute events overnight.     Objective:     Blood pressure 139/62, pulse 99, temperature 97.9 °F (36.6 °C), temperature source Temporal, resp. rate 18, height 5' 4\" (1.626 m), weight 135 kg (298 lb 8.1 oz), SpO2 92%.,Body mass index is 51.24 kg/m².      Intake/Output Summary (Last 24 hours) at 2/29/2024 0820  Last data filed at 2/29/2024 0407  Gross per 24 hour   Intake 2400 ml   Output --   Net 2400 ml       Invasive Devices       Peripheral Intravenous Line  Duration             Peripheral IV 02/27/24 Left;Proximal;Ventral (anterior) Forearm 1 day              Drain  Duration             NG/OG/Enteral Tube Nasogastric 16 Fr Right nare 1 day                    Physical Exam:   GEN: NAD  HEENT: NCAT, MMM  CV: RRR, no m/r/g  Lung: Normal effort, CTA B/L, no w/r/r  Ab: Soft, NT/ND.  Palpable right lower quadrant hernia that is soft and nontender.  Extrem: No CCE   Neuro: A+Ox3     Lab, Imaging and other studies:I have personally reviewed pertinent lab results.     VTE Pharmacologic Prophylaxis: Heparin  VTE Mechanical Prophylaxis: sequential compression device    Recent Results " (from the past 36 hour(s))   Hemoglobin A1C    Collection Time: 02/27/24  9:53 PM   Result Value Ref Range    Hemoglobin A1C 5.7 (H) Normal 4.0-5.6%; PreDiabetic 5.7-6.4%; Diabetic >=6.5%; Glycemic control for adults with diabetes <7.0% %     mg/dl   Prealbumin    Collection Time: 02/27/24  9:53 PM   Result Value Ref Range    Prealbumin 22.9 17.0 - 34.0 mg/dL   ECG 12 lead    Collection Time: 02/27/24 11:41 PM   Result Value Ref Range    Ventricular Rate 100 BPM    Atrial Rate 100 BPM    TN Interval 140 ms    QRSD Interval 90 ms    QT Interval 338 ms    QTC Interval 436 ms    P [place] 69 degrees    QRS Axis 68 degrees    T Wave Axis 52 degrees   Blood culture    Collection Time: 02/28/24  1:05 AM    Specimen: Arm, Left; Blood   Result Value Ref Range    Blood Culture Received in Microbiology Lab. Culture in Progress.    Blood culture    Collection Time: 02/28/24  1:05 AM    Specimen: Arm, Right; Blood   Result Value Ref Range    Blood Culture Received in Microbiology Lab. Culture in Progress.    Comprehensive metabolic panel    Collection Time: 02/28/24  4:52 AM   Result Value Ref Range    Sodium 137 135 - 147 mmol/L    Potassium 4.1 3.5 - 5.3 mmol/L    Chloride 103 96 - 108 mmol/L    CO2 25 21 - 32 mmol/L    ANION GAP 9 mmol/L    BUN 15 5 - 25 mg/dL    Creatinine 0.79 0.60 - 1.30 mg/dL    Glucose 137 65 - 140 mg/dL    Calcium 8.8 8.4 - 10.2 mg/dL    AST 18 13 - 39 U/L    ALT 21 7 - 52 U/L    Alkaline Phosphatase 99 34 - 104 U/L    Total Protein 7.2 6.4 - 8.4 g/dL    Albumin 3.9 3.5 - 5.0 g/dL    Total Bilirubin 0.46 0.20 - 1.00 mg/dL    eGFR 76 ml/min/1.73sq m   Magnesium    Collection Time: 02/28/24  4:52 AM   Result Value Ref Range    Magnesium 2.1 1.9 - 2.7 mg/dL   Phosphorus    Collection Time: 02/28/24  4:52 AM   Result Value Ref Range    Phosphorus 4.2 (H) 2.3 - 4.1 mg/dL   CBC and differential    Collection Time: 02/28/24  4:52 AM   Result Value Ref Range    WBC 10.82 (H) 4.31 - 10.16 Thousand/uL    RBC  4.89 3.81 - 5.12 Million/uL    Hemoglobin 15.2 11.5 - 15.4 g/dL    Hematocrit 44.8 34.8 - 46.1 %    MCV 92 82 - 98 fL    MCH 31.1 26.8 - 34.3 pg    MCHC 33.9 31.4 - 37.4 g/dL    RDW 12.2 11.6 - 15.1 %    MPV 10.2 8.9 - 12.7 fL    Platelets 230 149 - 390 Thousands/uL    nRBC 0 /100 WBCs    Neutrophils Relative 85 (H) 43 - 75 %    Immat GRANS % 0 0 - 2 %    Lymphocytes Relative 11 (L) 14 - 44 %    Monocytes Relative 4 4 - 12 %    Eosinophils Relative 0 0 - 6 %    Basophils Relative 0 0 - 1 %    Neutrophils Absolute 9.23 (H) 1.85 - 7.62 Thousands/µL    Immature Grans Absolute 0.04 0.00 - 0.20 Thousand/uL    Lymphocytes Absolute 1.14 0.60 - 4.47 Thousands/µL    Monocytes Absolute 0.41 0.17 - 1.22 Thousand/µL    Eosinophils Absolute 0.00 0.00 - 0.61 Thousand/µL    Basophils Absolute 0.00 0.00 - 0.10 Thousands/µL   ECG 12 lead    Collection Time: 02/28/24 10:20 AM   Result Value Ref Range    Ventricular Rate 93 BPM    Atrial Rate 93 BPM    NE Interval 130 ms    QRSD Interval 96 ms    QT Interval 368 ms    QTC Interval 457 ms    P Axis 56 degrees    QRS Axis 69 degrees    T Wave Axis 48 degrees   Basic metabolic panel    Collection Time: 02/29/24  7:18 AM   Result Value Ref Range    Sodium 138 135 - 147 mmol/L    Potassium 3.6 3.5 - 5.3 mmol/L    Chloride 108 96 - 108 mmol/L    CO2 22 21 - 32 mmol/L    ANION GAP 8 mmol/L    BUN 20 5 - 25 mg/dL    Creatinine 0.78 0.60 - 1.30 mg/dL    Glucose 108 65 - 140 mg/dL    Calcium 8.7 8.4 - 10.2 mg/dL    eGFR 77 ml/min/1.73sq m   \

## 2024-03-01 VITALS
SYSTOLIC BLOOD PRESSURE: 121 MMHG | BODY MASS INDEX: 50.02 KG/M2 | HEART RATE: 73 BPM | WEIGHT: 293 LBS | OXYGEN SATURATION: 85 % | RESPIRATION RATE: 16 BRPM | TEMPERATURE: 98.3 F | HEIGHT: 64 IN | DIASTOLIC BLOOD PRESSURE: 59 MMHG

## 2024-03-01 PROBLEM — K43.0 INCARCERATED INCISIONAL HERNIA: Status: RESOLVED | Noted: 2024-02-27 | Resolved: 2024-03-01

## 2024-03-01 PROBLEM — K56.609 SMALL BOWEL OBSTRUCTION (HCC): Status: RESOLVED | Noted: 2024-02-27 | Resolved: 2024-03-01

## 2024-03-01 LAB
AORTIC ROOT: 3 CM
APICAL FOUR CHAMBER EJECTION FRACTION: 52 %
BASOPHILS # BLD AUTO: 0.04 THOUSANDS/ÂΜL (ref 0–0.1)
BASOPHILS NFR BLD AUTO: 1 % (ref 0–1)
BSA FOR ECHO PROCEDURE: 2.32 M2
E WAVE DECELERATION TIME: 196 MS
E/A RATIO: 0.8
EOSINOPHIL # BLD AUTO: 0.07 THOUSAND/ÂΜL (ref 0–0.61)
EOSINOPHIL NFR BLD AUTO: 1 % (ref 0–6)
ERYTHROCYTE [DISTWIDTH] IN BLOOD BY AUTOMATED COUNT: 12.3 % (ref 11.6–15.1)
FRACTIONAL SHORTENING: 28 (ref 28–44)
HCT VFR BLD AUTO: 43.8 % (ref 34.8–46.1)
HGB BLD-MCNC: 14.5 G/DL (ref 11.5–15.4)
IMM GRANULOCYTES # BLD AUTO: 0.05 THOUSAND/UL (ref 0–0.2)
IMM GRANULOCYTES NFR BLD AUTO: 1 % (ref 0–2)
INTERVENTRICULAR SEPTUM IN DIASTOLE (PARASTERNAL SHORT AXIS VIEW): 1 CM
INTERVENTRICULAR SEPTUM: 1 CM (ref 0.6–1.1)
LAAS-AP2: 15.6 CM2
LAAS-AP4: 13.8 CM2
LEFT ATRIUM AREA SYSTOLE SINGLE PLANE A4C: 12.4 CM2
LEFT ATRIUM SIZE: 3.6 CM
LEFT ATRIUM VOLUME (MOD BIPLANE): 36 ML
LEFT ATRIUM VOLUME INDEX (MOD BIPLANE): 15.5 ML/M2
LEFT INTERNAL DIMENSION IN SYSTOLE: 4.2 CM (ref 2.1–4)
LEFT VENTRICULAR INTERNAL DIMENSION IN DIASTOLE: 5.8 CM (ref 3.5–6)
LEFT VENTRICULAR POSTERIOR WALL IN END DIASTOLE: 0.8 CM
LEFT VENTRICULAR STROKE VOLUME: 89 ML
LVSV (TEICH): 89 ML
LYMPHOCYTES # BLD AUTO: 1.86 THOUSANDS/ÂΜL (ref 0.6–4.47)
LYMPHOCYTES NFR BLD AUTO: 24 % (ref 14–44)
MCH RBC QN AUTO: 30.6 PG (ref 26.8–34.3)
MCHC RBC AUTO-ENTMCNC: 33.1 G/DL (ref 31.4–37.4)
MCV RBC AUTO: 92 FL (ref 82–98)
MONOCYTES # BLD AUTO: 0.49 THOUSAND/ÂΜL (ref 0.17–1.22)
MONOCYTES NFR BLD AUTO: 6 % (ref 4–12)
MV E'TISSUE VEL-SEP: 9 CM/S
MV PEAK A VEL: 0.98 M/S
MV PEAK E VEL: 78 CM/S
MV STENOSIS PRESSURE HALF TIME: 57 MS
MV VALVE AREA P 1/2 METHOD: 3.86
NEUTROPHILS # BLD AUTO: 5.26 THOUSANDS/ÂΜL (ref 1.85–7.62)
NEUTS SEG NFR BLD AUTO: 67 % (ref 43–75)
NRBC BLD AUTO-RTO: 0 /100 WBCS
PLATELET # BLD AUTO: 196 THOUSANDS/UL (ref 149–390)
PMV BLD AUTO: 10.5 FL (ref 8.9–12.7)
RBC # BLD AUTO: 4.74 MILLION/UL (ref 3.81–5.12)
RIGHT ATRIUM AREA SYSTOLE A4C: 11.2 CM2
RIGHT VENTRICLE ID DIMENSION: 3.4 CM
SL CV LEFT ATRIUM LENGTH A2C: 4.7 CM
SL CV LV EF: 55
SL CV PED ECHO LEFT VENTRICLE DIASTOLIC VOLUME (MOD BIPLANE) 2D: 168 ML
SL CV PED ECHO LEFT VENTRICLE SYSTOLIC VOLUME (MOD BIPLANE) 2D: 79 ML
TR MAX PG: 6 MMHG
TR PEAK VELOCITY: 1.2 M/S
TRICUSPID ANNULAR PLANE SYSTOLIC EXCURSION: 1.8 CM
TRICUSPID VALVE PEAK REGURGITATION VELOCITY: 1.21 M/S
WBC # BLD AUTO: 7.77 THOUSAND/UL (ref 4.31–10.16)

## 2024-03-01 PROCEDURE — 99232 SBSQ HOSP IP/OBS MODERATE 35: CPT | Performed by: NURSE PRACTITIONER

## 2024-03-01 PROCEDURE — 85025 COMPLETE CBC W/AUTO DIFF WBC: CPT

## 2024-03-01 PROCEDURE — C9113 INJ PANTOPRAZOLE SODIUM, VIA: HCPCS | Performed by: SURGERY

## 2024-03-01 PROCEDURE — 99232 SBSQ HOSP IP/OBS MODERATE 35: CPT | Performed by: INTERNAL MEDICINE

## 2024-03-01 RX ADMIN — PANTOPRAZOLE SODIUM 40 MG: 40 INJECTION, POWDER, FOR SOLUTION INTRAVENOUS at 08:44

## 2024-03-01 RX ADMIN — SODIUM CHLORIDE, SODIUM LACTATE, POTASSIUM CHLORIDE, AND CALCIUM CHLORIDE 75 ML/HR: .6; .31; .03; .02 INJECTION, SOLUTION INTRAVENOUS at 05:21

## 2024-03-01 NOTE — PROGRESS NOTES
"Cardiology Progress Note   Ivonne Marcos 68 y.o. female MRN: [id number]    Unit/Bed#: -01 Encounter: [id number]      Assessment:  Preoperative evaluation  Incarcerated incisional hernia  Small bowel obstruction  Hypertension  APCs    Plan:  TTE unrevealing.  She is at low risk of MACE  Blood pressure improved  Will see as needed.  Please call with questions or concerns    Subjective:   Patient seen and examined.  No significant events overnight.  She denies anginal symptoms or any cardiac concerns at this time.    Objective:     Vitals: Blood pressure 121/59, pulse 73, temperature 98.3 °F (36.8 °C), temperature source Oral, resp. rate 16, height 5' 4\" (1.626 m), weight 135 kg (297 lb 9.9 oz), SpO2 95%., Body mass index is 51.09 kg/m².,   Orthostatic Blood Pressures      Flowsheet Row Most Recent Value   Blood Pressure 121/59 filed at 03/01/2024 0736   Patient Position - Orthostatic VS Sitting filed at 02/28/2024 1300              Intake/Output Summary (Last 24 hours) at 3/1/2024 1118  Last data filed at 3/1/2024 0925  Gross per 24 hour   Intake 1117.5 ml   Output 200 ml   Net 917.5 ml         Physical Exam:    GEN: Ivonne Marcos appears well, alert and oriented x 3, pleasant and cooperative   HEENT: Sclera anicteric, conjunctivae pink, mucous membranes moist. Oropharynx clear.   NECK: supple, no significant JVD, Trachea midline, no thyromegaly.   HEART: regular rhythm, normal S1 and S2, no murmurs, clicks, gallops or rubs   LUNGS: clear to auscultation bilaterally; no wheezes, rales, or rhonchi. No increased work of breathing or signs of respiratory distress.   ABDOMEN: Soft, nontender, mildly distended   EXTREMITIES: Skin warm and well perfused, no clubbing, cyanosis, or edema.  NEURO: No focal findings. Normal speech. Mood and affect normal.   SKIN: Normal without suspicious lesions on exposed skin.      Medications:      Current Facility-Administered Medications:     heparin (porcine) subcutaneous " injection 7,500 Units, 7,500 Units, Subcutaneous, Q8H RANDALL, 7,500 Units at 02/29/24 1311 **AND** [CANCELED] Platelet count, , , Once, Jesus Gallo MD    hydrALAZINE (APRESOLINE) injection 5 mg, 5 mg, Intravenous, Q6H PRN, Nahomy Ellsworth PA-C    lactated ringers infusion, 75 mL/hr, Intravenous, Continuous, Mario Alberto Holloway PA-C, Last Rate: 75 mL/hr at 03/01/24 0521, 75 mL/hr at 03/01/24 0521    morphine injection 2 mg, 2 mg, Intravenous, Q2H PRN, Jesus Gallo MD, 2 mg at 02/27/24 2152    ondansetron (ZOFRAN) injection 4 mg, 4 mg, Intravenous, Q6H PRN, Jesus Gallo MD, 4 mg at 02/28/24 0314    pantoprazole (PROTONIX) injection 40 mg, 40 mg, Intravenous, Q24H RANDALL, Jesus Gallo MD, 40 mg at 03/01/24 0844     Labs & Results:        Results from last 7 days   Lab Units 03/01/24  0436 02/29/24  1116 02/28/24  0452   WBC Thousand/uL 7.77 9.58 10.82*   HEMOGLOBIN g/dL 14.5 14.6 15.2   HEMATOCRIT % 43.8 43.5 44.8   PLATELETS Thousands/uL 196 184 230         Results from last 7 days   Lab Units 02/29/24  0718 02/28/24  0452 02/27/24  1634   POTASSIUM mmol/L 3.6 4.1 4.0   CHLORIDE mmol/L 108 103 101   CO2 mmol/L 22 25 24   BUN mg/dL 20 15 16   CREATININE mg/dL 0.78 0.79 0.84   CALCIUM mg/dL 8.7 8.8 9.7   ALK PHOS U/L  --  99 114*   ALT U/L  --  21 26   AST U/L  --  18 25         Results from last 7 days   Lab Units 02/28/24  0452   MAGNESIUM mg/dL 2.1

## 2024-03-01 NOTE — PROGRESS NOTES
[facility]  Progress Note  Name: Ivonne Marcos I  MRN: [id number]  Unit/Bed#: -01 I Date of Admission: 2/27/2024   Date of Service: 3/1/2024 I Hospital Day: 3    Assessment/Plan   * Incarcerated incisional hernia  Assessment & Plan  CT A/P with SBO extending into large ventral hernia  Keep NPO  NG tube in place  S/P IV Ancef  Care per general surgery team    Small bowel obstruction (HCC)  Assessment & Plan  CT A/P: Small bowel obstruction involving multiple loops throughout the abdomen, extending into a large ventral abdominal hernia   Currently on clear liquids  NG tube removed  Continuous IVF  Pain control, anti-emetics  Care per general surgery team    Hypertension  Assessment & Plan  BP improving   No history of hypertension; however, does not follow with a PCP  Start on IV hydralazine 5mg Q6H PRN for SBP>170 while NPO  Has not received any IV hydralazine since initiating likely does not need medication on discharge  Referral for PCP at discharge  Monitor vitals per routine    Morbid obesity (HCC)  Assessment & Plan  Current BMI 52.98 kg/m2  Encouraged healthy diet and lifestyle modifications           VTE Prophylaxis: VTE Score: 5 High Risk (Score >/= 5) - Pharmacological DVT Prophylaxis Ordered: heparin. Sequential Compression Devices Ordered.    Mobility:   Basic Mobility Inpatient Raw Score: 24  JH-HLM Goal: 8: Walk 250 feet or more  JH-HLM Achieved: 7: Walk 25 feet or more  HLM Goal NOT achieved. Continue with multidisciplinary rounding and encourage appropriate mobility to improve upon HLM goals.    Recommendations for Discharge:  none    Total Time Spent on Date of Encounter in care of patient: 35 mins. This time was spent on one or more of the following: performing physical exam; counseling and coordination of care; obtaining or reviewing history; documenting in the medical record; reviewing/ordering tests, medications or procedures; communicating with other healthcare  "professionals and discussing with patient's family/caregivers.    Collaboration of Care: Were Recommendations Directly Discussed with Primary Treatment Team? Yes    History of Present Illness:  Ivonne Marcos is a 68 y.o. female who is originally admitted to the surgery service due to hernia. We are consulted for htn. Improved on its one no meds needed    Review of Systems:  Review of Systems   All other systems reviewed and are negative.      Past Medical and Surgical History:   History reviewed. No pertinent past medical history.    History reviewed. No pertinent surgical history.    Meds/Allergies:  PTA meds:   None       Allergies: No Known Allergies    Social History:  Marital Status: /Civil Union  Substance Use History:   Social History     Substance and Sexual Activity   Alcohol Use Not Currently     Social History     Tobacco Use   Smoking Status Never   Smokeless Tobacco Never     Social History     Substance and Sexual Activity   Drug Use Never       Family History:  History reviewed. No pertinent family history.    Physical Exam:   Vitals:   Blood Pressure: 121/59 (03/01/24 0736)  Pulse: 73 (03/01/24 0736)  Temperature: 98.3 °F (36.8 °C) (03/01/24 0736)  Temp Source: Oral (03/01/24 0736)  Respirations: 16 (02/29/24 2143)  Height: 5' 4\" (162.6 cm) (02/29/24 1543)  Weight - Scale: 135 kg (297 lb 9.9 oz) (02/29/24 1543)  SpO2: 95 % (03/01/24 0736)    Physical Exam  Vitals reviewed.   Constitutional:       General: She is not in acute distress.     Appearance: She is obese. She is not ill-appearing.   Cardiovascular:      Rate and Rhythm: Normal rate.   Pulmonary:      Effort: No respiratory distress.   Abdominal:      Palpations: Abdomen is soft.   Skin:     Coloration: Skin is pale.   Neurological:      Mental Status: She is alert. Mental status is at baseline.   Psychiatric:         Mood and Affect: Mood normal.          Additional Data:   Lab Results:    Results from last 7 days   Lab Units " 03/01/24  0436   WBC Thousand/uL 7.77   HEMOGLOBIN g/dL 14.5   HEMATOCRIT % 43.8   PLATELETS Thousands/uL 196   NEUTROS PCT % 67   LYMPHS PCT % 24   MONOS PCT % 6   EOS PCT % 1     Results from last 7 days   Lab Units 02/29/24  0718 02/28/24  0452   SODIUM mmol/L 138 137   POTASSIUM mmol/L 3.6 4.1   CHLORIDE mmol/L 108 103   CO2 mmol/L 22 25   BUN mg/dL 20 15   CREATININE mg/dL 0.78 0.79   ANION GAP mmol/L 8 9   CALCIUM mg/dL 8.7 8.8   ALBUMIN g/dL  --  3.9   TOTAL BILIRUBIN mg/dL  --  0.46   ALK PHOS U/L  --  99   ALT U/L  --  21   AST U/L  --  18   GLUCOSE RANDOM mg/dL 108 137             Lab Results   Component Value Date/Time    HGBA1C 5.7 (H) 02/27/2024 09:53 PM               Imaging:   CT abdomen pelvis with contrast   Final Result by Archie Moses DO (02/27 2043)      Small bowel obstruction involving multiple loops throughout the abdomen, extending into a large ventral abdominal hernia. The transition point is located within the hernia sac.         I personally discussed this study with PEEWEE FIELD on 2/27/2024 8:43 PM.               Workstation performed: [id number]               ** Please Note: This note may have been constructed using a voice recognition system. **

## 2024-03-01 NOTE — DISCHARGE INSTR - AVS FIRST PAGE
Call the surgery office for appointment in 2 weeks  Avoid activities that increase abdominal pressure, straining with bowel movements, lifting over 10 lbs. Strenuous abdominal exercises.   If the hernia protrudes and is uncomfortable, lie down, put ice on it and gently massage it.  Call the office if you have nausea, vomiting, diarrhea, unable to pass gas or have a bowel movement.  Continue to take Colace to avoid constipation.  Referral to PCP for HTN and Weight Loss Management is recommended.   You can wear a girdle or abdominal binder which you can buy in the pharmacy for comfort and support, remove at bedtime.

## 2024-03-01 NOTE — ASSESSMENT & PLAN NOTE
BP improving   No history of hypertension; however, does not follow with a PCP  Start on IV hydralazine 5mg Q6H PRN for SBP>170 while NPO  Has not received any IV hydralazine since initiating likely does not need medication on discharge  Referral for PCP at discharge  Monitor vitals per routine

## 2024-03-01 NOTE — DISCHARGE SUMMARY
Discharge Summary - Ivonne Marcos 68 y.o. female MRN: [id number]    Unit/Bed#: -01 Encounter: [id number]        Admitting Diagnosis:   Incarcerated Ventral Hernia   Morbid obesity (HCC) [E66.01]  Primary hypertension [I10]  Abdominal pain [R10.9]  Paroxysmal A-fib (HCC) [I48.0]    HPI: Admission Date: 2/27/2024 as per Admit Note by SHOSHANA Lim     HPI:  Ivonne Marcos is a 68 y.o. female with morbid obesity, who presents to the emergency department with complaint of abdominal pain, nausea, vomiting.  Patient states that she has been having intermittent symptoms for the past several months.  Patient has known about ventral hernia that developed shortly after undergoing laparoscopic cholecystectomy 10 years ago.  Patient reports that the hernia to present for a long has only began causing issues recently, with this being the first time she needed to seek medical care.  Patient states that she has a very sedentary lifestyle, poor diet.  Patient reports that hernia fell harder than it typically does, but had no increa  Plan:  -Continue n.p.o., IVF, NGT to low remittent wall suction  -Continue to monitor NGT output  -Continue to monitor bowel function  -Will consult cardiology for clearance and abnormal heart rate evaluation  -Given patient's obesity, it would be ideal to plan outpatient repair after weight loss and nutritional optimization.     Procedures Performed: none       Hospital Course: the patient was admitted with the above plan. The hernia was reduced at bedside by surgery, and the patient was more comfortable. NGT output was 400 ml. The following morning she reported 3 loose bowel movements, the NGT was clamped for a clamp trial, her NGT was pulled on second rounds, she was started on clears.   Day three she had tolerated clear liquids. She had passed gas and given a soft surgical diet.   HTN was managed with SLIM with improvement in BP, IV hydralazine was ordered however she did not  require dosing per the parameters set.  She will not need meds to be discharged with per SLIM note. She will follow up with a PCP per outpatient referral.   Cardiology was consulted stating she is low risk for MACE, TTE evaluation for structural heart disease, LV normal with LV EF 55%,     On the day of discharge:  VSS  Lungs clear  RRR  ABD:soft, nontender, large ventral hernia is present without tenderness. It is not firm. It is easily reduced without difficulty. NBS.  Morbid obesity with large pannus.   No calf tenderness      Pt will follow up in 2 weeks to discuss weight loss management and timing of Ventral Hernia Repair     Discharge instructions were given verbally and written     Complications: none     Discharge Diagnosis:   Ventral Hernia Incarcerated , reduced manually  HTN     Condition at Discharge: fair     Discharge instructions/Information to patient and family:   See after visit summary for information provided to patient and family.      Provisions for Follow-Up Care:  See after visit summary for information related to follow-up care and any pertinent home health orders.      Disposition: See After Visit Summary for discharge disposition information.    Planned Readmission: No    Discharge Statement   I spent 30  minutes discharging the patient. This time was spent on the day of discharge. I had direct contact with the patient on the day of discharge. Additional documentation is required if more than 30 minutes were spent on discharge.     Discharge Medications:  See after visit summary for reconciled discharge medications provided to patient and family.        Shirley Kemp

## 2024-03-01 NOTE — PLAN OF CARE
Problem: GASTROINTESTINAL - ADULT  Goal: Minimal or absence of nausea and/or vomiting  Description: INTERVENTIONS:  - Administer IV fluids if ordered to ensure adequate hydration  - Maintain NPO status until nausea and vomiting are resolved  - Nasogastric tube if ordered  - Administer ordered antiemetic medications as needed  - Provide nonpharmacologic comfort measures as appropriate  - Advance diet as tolerated, if ordered  - Consider nutrition services referral to assist patient with adequate nutrition and appropriate food choices  3/1/2024 1405 by Avani aGrner RN  Outcome: Adequate for Discharge  3/1/2024 1403 by Avani Garner RN  Outcome: Progressing  Goal: Maintains or returns to baseline bowel function  Description: INTERVENTIONS:  - Assess bowel function  - Encourage oral fluids to ensure adequate hydration  - Administer IV fluids if ordered to ensure adequate hydration  - Administer ordered medications as needed  - Encourage mobilization and activity  - Consider nutritional services referral to assist patient with adequate nutrition and appropriate food choices  3/1/2024 1405 by Avani Garner RN  Outcome: Adequate for Discharge  3/1/2024 1403 by Avani Garner RN  Outcome: Progressing     Problem: HEMATOLOGIC - ADULT  Goal: Maintains hematologic stability  Description: INTERVENTIONS  - Assess for signs and symptoms of bleeding or hemorrhage  - Monitor labs  - Administer supportive blood products/factors as ordered and appropriate  3/1/2024 1405 by Avani Garner RN  Outcome: Adequate for Discharge  3/1/2024 1403 by Avani Garner RN  Outcome: Progressing

## 2024-03-01 NOTE — ASSESSMENT & PLAN NOTE
CT A/P: Small bowel obstruction involving multiple loops throughout the abdomen, extending into a large ventral abdominal hernia   Currently on clear liquids  NG tube removed  Continuous IVF  Pain control, anti-emetics  Care per general surgery team

## 2024-03-03 ENCOUNTER — APPOINTMENT (EMERGENCY)
Dept: CT IMAGING | Facility: HOSPITAL | Age: 69
DRG: 336 | End: 2024-03-03
Payer: MEDICARE

## 2024-03-03 ENCOUNTER — HOSPITAL ENCOUNTER (INPATIENT)
Facility: HOSPITAL | Age: 69
LOS: 5 days | Discharge: HOME HEALTH | DRG: 336 | End: 2024-03-09
Attending: EMERGENCY MEDICINE | Admitting: SURGERY
Payer: MEDICARE

## 2024-03-03 DIAGNOSIS — I10 HYPERTENSION, UNSPECIFIED TYPE: ICD-10-CM

## 2024-03-03 DIAGNOSIS — E66.01 MORBID OBESITY (HCC): ICD-10-CM

## 2024-03-03 DIAGNOSIS — R10.84 GENERALIZED ABDOMINAL PAIN: ICD-10-CM

## 2024-03-03 DIAGNOSIS — R10.13 EPIGASTRIC PAIN: ICD-10-CM

## 2024-03-03 DIAGNOSIS — R11.0 NAUSEA: ICD-10-CM

## 2024-03-03 DIAGNOSIS — K56.609 SBO (SMALL BOWEL OBSTRUCTION) (HCC): Primary | ICD-10-CM

## 2024-03-03 LAB
ALBUMIN SERPL BCP-MCNC: 4.1 G/DL (ref 3.5–5)
ALP SERPL-CCNC: 102 U/L (ref 34–104)
ALT SERPL W P-5'-P-CCNC: 35 U/L (ref 7–52)
ANION GAP SERPL CALCULATED.3IONS-SCNC: 10 MMOL/L
AST SERPL W P-5'-P-CCNC: 33 U/L (ref 13–39)
ATRIAL RATE: 100 BPM
ATRIAL RATE: 93 BPM
BACTERIA BLD CULT: NORMAL
BACTERIA BLD CULT: NORMAL
BASOPHILS # BLD AUTO: 0.02 THOUSANDS/ÂΜL (ref 0–0.1)
BASOPHILS NFR BLD AUTO: 0 % (ref 0–1)
BILIRUB SERPL-MCNC: 0.72 MG/DL (ref 0.2–1)
BUN SERPL-MCNC: 12 MG/DL (ref 5–25)
CALCIUM SERPL-MCNC: 9.2 MG/DL (ref 8.4–10.2)
CHLORIDE SERPL-SCNC: 99 MMOL/L (ref 96–108)
CO2 SERPL-SCNC: 26 MMOL/L (ref 21–32)
CREAT SERPL-MCNC: 0.83 MG/DL (ref 0.6–1.3)
EOSINOPHIL # BLD AUTO: 0 THOUSAND/ÂΜL (ref 0–0.61)
EOSINOPHIL NFR BLD AUTO: 0 % (ref 0–6)
ERYTHROCYTE [DISTWIDTH] IN BLOOD BY AUTOMATED COUNT: 11.9 % (ref 11.6–15.1)
GFR SERPL CREATININE-BSD FRML MDRD: 72 ML/MIN/1.73SQ M
GLUCOSE SERPL-MCNC: 154 MG/DL (ref 65–140)
HCT VFR BLD AUTO: 47.6 % (ref 34.8–46.1)
HGB BLD-MCNC: 16.6 G/DL (ref 11.5–15.4)
IMM GRANULOCYTES # BLD AUTO: 0.06 THOUSAND/UL (ref 0–0.2)
IMM GRANULOCYTES NFR BLD AUTO: 1 % (ref 0–2)
LIPASE SERPL-CCNC: 8 U/L (ref 11–82)
LYMPHOCYTES # BLD AUTO: 1.01 THOUSANDS/ÂΜL (ref 0.6–4.47)
LYMPHOCYTES NFR BLD AUTO: 9 % (ref 14–44)
MCH RBC QN AUTO: 31 PG (ref 26.8–34.3)
MCHC RBC AUTO-ENTMCNC: 34.9 G/DL (ref 31.4–37.4)
MCV RBC AUTO: 89 FL (ref 82–98)
MONOCYTES # BLD AUTO: 0.53 THOUSAND/ÂΜL (ref 0.17–1.22)
MONOCYTES NFR BLD AUTO: 5 % (ref 4–12)
NEUTROPHILS # BLD AUTO: 9.93 THOUSANDS/ÂΜL (ref 1.85–7.62)
NEUTS SEG NFR BLD AUTO: 85 % (ref 43–75)
NRBC BLD AUTO-RTO: 0 /100 WBCS
P AXIS: 56 DEGREES
P AXIS: 69 DEGREES
PLATELET # BLD AUTO: 261 THOUSANDS/UL (ref 149–390)
PLATELET # BLD AUTO: 265 THOUSANDS/UL (ref 149–390)
PMV BLD AUTO: 10.3 FL (ref 8.9–12.7)
PMV BLD AUTO: 10.5 FL (ref 8.9–12.7)
POTASSIUM SERPL-SCNC: 3.7 MMOL/L (ref 3.5–5.3)
PR INTERVAL: 130 MS
PR INTERVAL: 140 MS
PROT SERPL-MCNC: 7.8 G/DL (ref 6.4–8.4)
QRS AXIS: 68 DEGREES
QRS AXIS: 69 DEGREES
QRSD INTERVAL: 90 MS
QRSD INTERVAL: 96 MS
QT INTERVAL: 338 MS
QT INTERVAL: 368 MS
QTC INTERVAL: 436 MS
QTC INTERVAL: 457 MS
RBC # BLD AUTO: 5.35 MILLION/UL (ref 3.81–5.12)
SODIUM SERPL-SCNC: 135 MMOL/L (ref 135–147)
T WAVE AXIS: 48 DEGREES
T WAVE AXIS: 52 DEGREES
VENTRICULAR RATE: 100 BPM
VENTRICULAR RATE: 93 BPM
WBC # BLD AUTO: 11.55 THOUSAND/UL (ref 4.31–10.16)

## 2024-03-03 PROCEDURE — 36415 COLL VENOUS BLD VENIPUNCTURE: CPT | Performed by: EMERGENCY MEDICINE

## 2024-03-03 PROCEDURE — 85025 COMPLETE CBC W/AUTO DIFF WBC: CPT | Performed by: EMERGENCY MEDICINE

## 2024-03-03 PROCEDURE — 96374 THER/PROPH/DIAG INJ IV PUSH: CPT

## 2024-03-03 PROCEDURE — 96361 HYDRATE IV INFUSION ADD-ON: CPT

## 2024-03-03 PROCEDURE — 80053 COMPREHEN METABOLIC PANEL: CPT | Performed by: EMERGENCY MEDICINE

## 2024-03-03 PROCEDURE — 74177 CT ABD & PELVIS W/CONTRAST: CPT

## 2024-03-03 PROCEDURE — 83690 ASSAY OF LIPASE: CPT | Performed by: EMERGENCY MEDICINE

## 2024-03-03 PROCEDURE — 93010 ELECTROCARDIOGRAM REPORT: CPT | Performed by: INTERNAL MEDICINE

## 2024-03-03 PROCEDURE — 99285 EMERGENCY DEPT VISIT HI MDM: CPT | Performed by: EMERGENCY MEDICINE

## 2024-03-03 PROCEDURE — 85049 AUTOMATED PLATELET COUNT: CPT | Performed by: SURGERY

## 2024-03-03 PROCEDURE — 99284 EMERGENCY DEPT VISIT MOD MDM: CPT

## 2024-03-03 RX ORDER — FAMOTIDINE 10 MG/ML
20 INJECTION, SOLUTION INTRAVENOUS EVERY 12 HOURS SCHEDULED
Status: DISCONTINUED | OUTPATIENT
Start: 2024-03-03 | End: 2024-03-09 | Stop reason: HOSPADM

## 2024-03-03 RX ORDER — HEPARIN SODIUM 5000 [USP'U]/ML
7500 INJECTION, SOLUTION INTRAVENOUS; SUBCUTANEOUS EVERY 8 HOURS SCHEDULED
Status: DISCONTINUED | OUTPATIENT
Start: 2024-03-03 | End: 2024-03-09 | Stop reason: HOSPADM

## 2024-03-03 RX ORDER — SODIUM CHLORIDE, SODIUM LACTATE, POTASSIUM CHLORIDE, CALCIUM CHLORIDE 600; 310; 30; 20 MG/100ML; MG/100ML; MG/100ML; MG/100ML
150 INJECTION, SOLUTION INTRAVENOUS CONTINUOUS
Status: DISCONTINUED | OUTPATIENT
Start: 2024-03-03 | End: 2024-03-06

## 2024-03-03 RX ORDER — ACETAMINOPHEN 325 MG/1
650 TABLET ORAL EVERY 6 HOURS PRN
Status: DISCONTINUED | OUTPATIENT
Start: 2024-03-03 | End: 2024-03-09 | Stop reason: HOSPADM

## 2024-03-03 RX ORDER — MAGNESIUM HYDROXIDE/ALUMINUM HYDROXICE/SIMETHICONE 120; 1200; 1200 MG/30ML; MG/30ML; MG/30ML
30 SUSPENSION ORAL ONCE
Status: COMPLETED | OUTPATIENT
Start: 2024-03-03 | End: 2024-03-03

## 2024-03-03 RX ORDER — ONDANSETRON 2 MG/ML
4 INJECTION INTRAMUSCULAR; INTRAVENOUS EVERY 6 HOURS PRN
Status: DISCONTINUED | OUTPATIENT
Start: 2024-03-03 | End: 2024-03-09 | Stop reason: HOSPADM

## 2024-03-03 RX ORDER — MORPHINE SULFATE 4 MG/ML
4 INJECTION, SOLUTION INTRAMUSCULAR; INTRAVENOUS ONCE
Status: COMPLETED | OUTPATIENT
Start: 2024-03-03 | End: 2024-03-03

## 2024-03-03 RX ADMIN — SODIUM CHLORIDE, SODIUM LACTATE, POTASSIUM CHLORIDE, AND CALCIUM CHLORIDE 125 ML/HR: .6; .31; .03; .02 INJECTION, SOLUTION INTRAVENOUS at 22:23

## 2024-03-03 RX ADMIN — FAMOTIDINE 20 MG: 10 INJECTION, SOLUTION INTRAVENOUS at 22:19

## 2024-03-03 RX ADMIN — SODIUM CHLORIDE 500 ML: 0.9 INJECTION, SOLUTION INTRAVENOUS at 18:23

## 2024-03-03 RX ADMIN — MORPHINE SULFATE 4 MG: 4 INJECTION INTRAVENOUS at 17:45

## 2024-03-03 RX ADMIN — ALUMINUM HYDROXIDE, MAGNESIUM HYDROXIDE, AND DIMETHICONE 30 ML: 200; 20; 200 SUSPENSION ORAL at 19:12

## 2024-03-03 RX ADMIN — IOHEXOL 100 ML: 350 INJECTION, SOLUTION INTRAVENOUS at 18:50

## 2024-03-03 RX ADMIN — MORPHINE SULFATE 2 MG: 2 INJECTION, SOLUTION INTRAMUSCULAR; INTRAVENOUS at 22:40

## 2024-03-03 RX ADMIN — HEPARIN SODIUM 7500 UNITS: 5000 INJECTION INTRAVENOUS; SUBCUTANEOUS at 22:19

## 2024-03-03 RX ADMIN — ONDANSETRON 4 MG: 2 INJECTION INTRAMUSCULAR; INTRAVENOUS at 22:39

## 2024-03-03 NOTE — ED PROVIDER NOTES
History  Chief Complaint   Patient presents with    Abdominal Pain     Pt c/o mid  abdominal since being d/c from the hospital Friday, denies n/v/d     68-year-old female history of hypertension and recent small bowel obstruction presenting with abdominal pain.  Patient reports increasing epigastric and left upper quadrant abdominal pain and tenderness similar to previous small bowel obstruction pain last week.  Reports intermittent nausea none currently.  Denies any vomiting.  Last bowel movement early this morning.  Denies any chest pain shortness of breath.  Denies any other complaints.  Chart reviewed.    History reviewed. No pertinent past medical history.  Family History: non-contributory  Social History          None       History reviewed. No pertinent past medical history.    History reviewed. No pertinent surgical history.    History reviewed. No pertinent family history.  I have reviewed and agree with the history as documented.    E-Cigarette/Vaping     E-Cigarette/Vaping Substances     Social History     Tobacco Use    Smoking status: Never    Smokeless tobacco: Never   Substance Use Topics    Alcohol use: Not Currently    Drug use: Never       Review of Systems   Constitutional:  Negative for appetite change, chills, diaphoresis, fever and unexpected weight change.   HENT:  Negative for congestion and rhinorrhea.    Eyes:  Negative for photophobia and visual disturbance.   Respiratory:  Negative for cough, chest tightness and shortness of breath.    Cardiovascular:  Negative for chest pain, palpitations and leg swelling.   Gastrointestinal:  Positive for abdominal pain. Negative for abdominal distention, blood in stool, constipation, diarrhea, nausea and vomiting.   Genitourinary:  Negative for dysuria and hematuria.   Musculoskeletal:  Negative for back pain, joint swelling, neck pain and neck stiffness.   Skin:  Negative for color change, pallor, rash and wound.   Neurological:  Negative for  dizziness, syncope, weakness, light-headedness and headaches.   Psychiatric/Behavioral:  Negative for agitation.    All other systems reviewed and are negative.      Physical Exam  Physical Exam  Vitals and nursing note reviewed.   Constitutional:       General: She is not in acute distress.     Appearance: Normal appearance. She is well-developed. She is not ill-appearing, toxic-appearing or diaphoretic.   HENT:      Head: Normocephalic and atraumatic.      Nose: Nose normal. No congestion or rhinorrhea.      Mouth/Throat:      Mouth: Mucous membranes are moist.      Pharynx: Oropharynx is clear. No oropharyngeal exudate or posterior oropharyngeal erythema.   Eyes:      General: No scleral icterus.        Right eye: No discharge.         Left eye: No discharge.      Extraocular Movements: Extraocular movements intact.      Conjunctiva/sclera: Conjunctivae normal.      Pupils: Pupils are equal, round, and reactive to light.   Neck:      Vascular: No JVD.      Trachea: No tracheal deviation.      Comments: Supple. Normal range of motion.   Cardiovascular:      Rate and Rhythm: Normal rate and regular rhythm.      Heart sounds: Normal heart sounds. No murmur heard.     No friction rub. No gallop.      Comments: Normal rate and regular rhythm  Pulmonary:      Effort: Pulmonary effort is normal. No respiratory distress.      Breath sounds: Normal breath sounds. No stridor. No wheezing or rales.      Comments: Clear to auscultation bilaterally  Chest:      Chest wall: No tenderness.   Abdominal:      General: Bowel sounds are normal. There is no distension.      Palpations: Abdomen is soft.      Tenderness: There is abdominal tenderness in the epigastric area and left upper quadrant. There is no right CVA tenderness, left CVA tenderness, guarding or rebound.      Comments: Epigastric with precaution abdominal tenderness without guarding.  Otherwise soft and nondistended.  Normal bowel sounds throughout   Musculoskeletal:          General: No swelling, tenderness, deformity or signs of injury. Normal range of motion.      Cervical back: Normal range of motion and neck supple. No rigidity. No muscular tenderness.      Right lower leg: No edema.      Left lower leg: No edema.   Lymphadenopathy:      Cervical: No cervical adenopathy.   Skin:     General: Skin is warm and dry.      Coloration: Skin is not pale.      Findings: No erythema or rash.   Neurological:      General: No focal deficit present.      Mental Status: She is alert. Mental status is at baseline.      Sensory: No sensory deficit.      Motor: No weakness or abnormal muscle tone.      Coordination: Coordination normal.      Gait: Gait normal.      Comments: Alert.  Strength and sensation grossly intact.  Ambulatory without difficulty at baseline.    Psychiatric:         Behavior: Behavior normal.         Thought Content: Thought content normal.         Vital Signs  ED Triage Vitals   Temperature Pulse Respirations Blood Pressure SpO2   03/03/24 1712 03/03/24 1712 03/03/24 1712 03/03/24 1712 03/03/24 1712   97.9 °F (36.6 °C) (!) 106 20 167/97 95 %      Temp Source Heart Rate Source Patient Position - Orthostatic VS BP Location FiO2 (%)   03/03/24 1712 03/03/24 1712 03/03/24 1712 03/03/24 1712 --   Oral Monitor Sitting Left arm       Pain Score       03/03/24 1745       10 - Worst Possible Pain           Vitals:    03/03/24 1712 03/03/24 1830 03/03/24 1900   BP: 167/97 162/77 168/77   Pulse: (!) 106 94 105   Patient Position - Orthostatic VS: Sitting Lying Lying         Visual Acuity      ED Medications  Medications   morphine injection 4 mg (4 mg Intravenous Given 3/3/24 1745)   sodium chloride 0.9 % bolus 500 mL (500 mL Intravenous New Bag 3/3/24 1823)   iohexol (OMNIPAQUE) 350 MG/ML injection (MULTI-DOSE) 100 mL (100 mL Intravenous Given 3/3/24 1850)   aluminum-magnesium hydroxide-simethicone (MAALOX) oral suspension 30 mL (30 mL Oral Given 3/3/24 1912)       Diagnostic  Studies  Results Reviewed       Procedure Component Value Units Date/Time    Comprehensive metabolic panel [[id number]]  (Abnormal) Collected: 03/03/24 1747    Lab Status: Final result Specimen: Blood from Arm, Right Updated: 03/03/24 1808     Sodium 135 mmol/L      Potassium 3.7 mmol/L      Chloride 99 mmol/L      CO2 26 mmol/L      ANION GAP 10 mmol/L      BUN 12 mg/dL      Creatinine 0.83 mg/dL      Glucose 154 mg/dL      Calcium 9.2 mg/dL      AST 33 U/L      ALT 35 U/L      Alkaline Phosphatase 102 U/L      Total Protein 7.8 g/dL      Albumin 4.1 g/dL      Total Bilirubin 0.72 mg/dL      eGFR 72 ml/min/1.73sq m     Narrative:      National Kid[employer] Disease Foundation guidelines for Chronic Kidney Disease (CKD):     Stage 1 with normal or high GFR (GFR > 90 mL/min/1.73 square meters)    Stage 2 Mild CKD (GFR = 60-89 mL/min/1.73 square meters)    Stage 3A Moderate CKD (GFR = 45-59 mL/min/1.73 square meters)    Stage 3B Moderate CKD (GFR = 30-44 mL/min/1.73 square meters)    Stage 4 Severe CKD (GFR = 15-29 mL/min/1.73 square meters)    Stage 5 End Stage CKD (GFR <15 mL/min/1.73 square meters)  Note: GFR calculation is accurate only with a steady state creatinine    Lipase [[id number]]  (Abnormal) Collected: 03/03/24 1747    Lab Status: Final result Specimen: Blood from Arm, Right Updated: 03/03/24 1808     Lipase 8 u/L     CBC and differential [[id number]]  (Abnormal) Collected: 03/03/24 1747    Lab Status: Final result Specimen: Blood from Arm, Right Updated: 03/03/24 1754     WBC 11.55 Thousand/uL      RBC 5.35 Million/uL      Hemoglobin 16.6 g/dL      Hematocrit 47.6 %      MCV 89 fL      MCH 31.0 pg      MCHC 34.9 g/dL      RDW 11.9 %      MPV 10.5 fL      Platelets 261 Thousands/uL      nRBC 0 /100 WBCs      Neutrophils Relative 85 %      Immat GRANS % 1 %      Lymphocytes Relative 9 %      Monocytes Relative 5 %      Eosinophils Relative 0 %      Basophils Relative 0 %      Neutrophils Absolute 9.93 Thousands/µL       Immature Grans Absolute 0.06 Thousand/uL      Lymphocytes Absolute 1.01 Thousands/µL      Monocytes Absolute 0.53 Thousand/µL      Eosinophils Absolute 0.00 Thousand/µL      Basophils Absolute 0.02 Thousands/µL                    CT abdomen pelvis with contrast   Final Result by Mane Matthew MD (03/03 2021)      Multiple fluid-filled small bowel loops with transition point within a large lobulated right lower quadrant ventral abdominal wall hernia compatible with small bowel obstruction. There are dilated loops of small bowel which also abruptly change in    caliber at the entrance and exit of the hernia, closed-loop obstruction cannot be excluded. Surgical consultation is recommended.      I personally discussed this study with CATHY BA on 3/3/2024 8:19 PM.      Workstation performed: [id number]                    Procedures  Procedures         ED Course                               SBIRT 22yo+      Flowsheet Row Most Recent Value   Initial Alcohol Screen: US AUDIT-C     1. How often do you have a drink containing alcohol? 0 Filed at: 03/03/2024 1714   2. How many drinks containing alcohol do you have on a typical day you are drinking?  0 Filed at: 03/03/2024 1714   3b. FEMALE Any Age, or MALE 65+: How often do you have 4 or more drinks on one occassion? 0 Filed at: 03/03/2024 1714   Audit-C Score 0 Filed at: 03/03/2024 1714   CARMELA: How many times in the past year have you...    Used an illegal drug or used a prescription medication for non-medical reasons? Never Filed at: 03/03/2024 1714                      Medical Decision Making  68-year-old female history of hypertension and recent small bowel obstruction presenting with abdominal pain.  Beginning with similar symptoms to recent small bowel obstruction.  Plan for labs including abdominal labs.  Symptom management with IV pain medication.  CT imaging.  Reassess.    Labs interpreted me with white count of 11.  Since improving with medication.  CT  imaging concerning for small bowel obstruction in the large ventral hernia with concern for possible closed-loop obstruction.  No nausea vomiting.  Discussed case with general surgery.  No NG tube at this time.  Plan for further evaluation management patient admitted.    Amount and/or Complexity of Data Reviewed  Labs: ordered.  Radiology: ordered.    Risk  OTC drugs.  Prescription drug management.  Decision regarding hospitalization.             Disposition  Final diagnoses:   Generalized abdominal pain   Epigastric pain   Nausea   SBO (small bowel obstruction) (HCC)     Time reflects when diagnosis was documented in both MDM as applicable and the Disposition within this note       Time User Action Codes Description Comment    3/3/2024  8:45 PM Erne, Osvaldo Add [R10.84] Generalized abdominal pain     3/3/2024  8:45 PM Erne, Osvaldo Add [R10.13] Epigastric pain     3/3/2024  8:45 PM Erne, Osvaldo Add [R11.0] Nausea     3/3/2024  8:45 PM Erne, Osvaldo Add [K56.609] SBO (small bowel obstruction) (HCC)     3/3/2024  8:45 PM Erne, Osvaldo Modify [R10.84] Generalized abdominal pain     3/3/2024  8:45 PM Erne, Osvaldo Modify [K56.609] SBO (small bowel obstruction) (HCC)           ED Disposition       ED Disposition   Admit    Condition   Stable    Date/Time   Sun Mar 3, 2024 2045    Comment   Case was discussed with gen surg and the patient's admission status was agreed to be to the service of Dr. De La Torre .               Follow-up Information    None         Patient's Medications    No medications on file       No discharge procedures on file.    PDMP Review       None            ED Provider  Electronically Signed by             Osvaldo Hurst MD  03/03/24 2046

## 2024-03-04 ENCOUNTER — ANESTHESIA EVENT (OUTPATIENT)
Dept: PERIOP | Facility: HOSPITAL | Age: 69
DRG: 336 | End: 2024-03-04
Payer: MEDICARE

## 2024-03-04 ENCOUNTER — APPOINTMENT (INPATIENT)
Dept: RADIOLOGY | Facility: HOSPITAL | Age: 69
DRG: 336 | End: 2024-03-04
Payer: MEDICARE

## 2024-03-04 ENCOUNTER — ANESTHESIA (OUTPATIENT)
Dept: PERIOP | Facility: HOSPITAL | Age: 69
DRG: 336 | End: 2024-03-04
Payer: MEDICARE

## 2024-03-04 LAB
BACTERIA BLD CULT: NORMAL
BACTERIA BLD CULT: NORMAL
ERYTHROCYTE [DISTWIDTH] IN BLOOD BY AUTOMATED COUNT: 11.9 % (ref 11.6–15.1)
HCT VFR BLD AUTO: 45.4 % (ref 34.8–46.1)
HGB BLD-MCNC: 15.9 G/DL (ref 11.5–15.4)
MCH RBC QN AUTO: 31.1 PG (ref 26.8–34.3)
MCHC RBC AUTO-ENTMCNC: 35 G/DL (ref 31.4–37.4)
MCV RBC AUTO: 89 FL (ref 82–98)
PLATELET # BLD AUTO: 243 THOUSANDS/UL (ref 149–390)
PMV BLD AUTO: 10.1 FL (ref 8.9–12.7)
RBC # BLD AUTO: 5.12 MILLION/UL (ref 3.81–5.12)
WBC # BLD AUTO: 11.8 THOUSAND/UL (ref 4.31–10.16)

## 2024-03-04 PROCEDURE — C1765 ADHESION BARRIER: HCPCS | Performed by: SURGERY

## 2024-03-04 PROCEDURE — 74018 RADEX ABDOMEN 1 VIEW: CPT

## 2024-03-04 PROCEDURE — 94760 N-INVAS EAR/PLS OXIMETRY 1: CPT

## 2024-03-04 PROCEDURE — 85027 COMPLETE CBC AUTOMATED: CPT | Performed by: SURGERY

## 2024-03-04 PROCEDURE — NC001 PR NO CHARGE

## 2024-03-04 PROCEDURE — NC001 PR NO CHARGE: Performed by: SURGERY

## 2024-03-04 PROCEDURE — 0WUF0JZ SUPPLEMENT ABDOMINAL WALL WITH SYNTHETIC SUBSTITUTE, OPEN APPROACH: ICD-10-PCS | Performed by: SURGERY

## 2024-03-04 PROCEDURE — C1781 MESH (IMPLANTABLE): HCPCS | Performed by: SURGERY

## 2024-03-04 PROCEDURE — 0DN80ZZ RELEASE SMALL INTESTINE, OPEN APPROACH: ICD-10-PCS | Performed by: SURGERY

## 2024-03-04 PROCEDURE — 49255 REMOVAL OF OMENTUM: CPT | Performed by: SURGERY

## 2024-03-04 PROCEDURE — 49255 REMOVAL OF OMENTUM: CPT

## 2024-03-04 PROCEDURE — 0DBU0ZZ EXCISION OF OMENTUM, OPEN APPROACH: ICD-10-PCS | Performed by: SURGERY

## 2024-03-04 DEVICE — VENTRALIGHT ST MESH
Type: IMPLANTABLE DEVICE | Site: ABDOMEN | Status: FUNCTIONAL
Brand: VENTRALIGHT ST

## 2024-03-04 RX ORDER — DOCUSATE SODIUM 100 MG/1
100 CAPSULE, LIQUID FILLED ORAL 2 TIMES DAILY
Status: DISCONTINUED | OUTPATIENT
Start: 2024-03-04 | End: 2024-03-09 | Stop reason: HOSPADM

## 2024-03-04 RX ORDER — PROPOFOL 10 MG/ML
INJECTION, EMULSION INTRAVENOUS AS NEEDED
Status: DISCONTINUED | OUTPATIENT
Start: 2024-03-04 | End: 2024-03-04

## 2024-03-04 RX ORDER — HYDROMORPHONE HCL/PF 1 MG/ML
1 SYRINGE (ML) INJECTION EVERY 6 HOURS PRN
Status: DISCONTINUED | OUTPATIENT
Start: 2024-03-04 | End: 2024-03-09 | Stop reason: HOSPADM

## 2024-03-04 RX ORDER — MAGNESIUM HYDROXIDE 1200 MG/15ML
LIQUID ORAL AS NEEDED
Status: DISCONTINUED | OUTPATIENT
Start: 2024-03-04 | End: 2024-03-04 | Stop reason: HOSPADM

## 2024-03-04 RX ORDER — BUPIVACAINE HYDROCHLORIDE 2.5 MG/ML
INJECTION, SOLUTION EPIDURAL; INFILTRATION; INTRACAUDAL AS NEEDED
Status: DISCONTINUED | OUTPATIENT
Start: 2024-03-04 | End: 2024-03-04 | Stop reason: HOSPADM

## 2024-03-04 RX ORDER — ONDANSETRON 2 MG/ML
4 INJECTION INTRAMUSCULAR; INTRAVENOUS EVERY 6 HOURS PRN
Status: DISCONTINUED | OUTPATIENT
Start: 2024-03-04 | End: 2024-03-04

## 2024-03-04 RX ORDER — KETOROLAC TROMETHAMINE 30 MG/ML
15 INJECTION, SOLUTION INTRAMUSCULAR; INTRAVENOUS EVERY 6 HOURS PRN
Status: DISCONTINUED | OUTPATIENT
Start: 2024-03-04 | End: 2024-03-05

## 2024-03-04 RX ORDER — HYDROMORPHONE HCL/PF 1 MG/ML
0.2 SYRINGE (ML) INJECTION
Status: DISCONTINUED | OUTPATIENT
Start: 2024-03-04 | End: 2024-03-04 | Stop reason: HOSPADM

## 2024-03-04 RX ORDER — ACETAMINOPHEN 10 MG/ML
1000 INJECTION, SOLUTION INTRAVENOUS EVERY 8 HOURS
Status: DISCONTINUED | OUTPATIENT
Start: 2024-03-05 | End: 2024-03-06

## 2024-03-04 RX ORDER — LIDOCAINE HYDROCHLORIDE 10 MG/ML
INJECTION, SOLUTION EPIDURAL; INFILTRATION; INTRACAUDAL; PERINEURAL AS NEEDED
Status: DISCONTINUED | OUTPATIENT
Start: 2024-03-04 | End: 2024-03-04

## 2024-03-04 RX ORDER — METOPROLOL TARTRATE 1 MG/ML
INJECTION, SOLUTION INTRAVENOUS AS NEEDED
Status: DISCONTINUED | OUTPATIENT
Start: 2024-03-04 | End: 2024-03-04

## 2024-03-04 RX ORDER — CEFAZOLIN SODIUM 2 G/50ML
2000 SOLUTION INTRAVENOUS EVERY 8 HOURS
Status: DISCONTINUED | OUTPATIENT
Start: 2024-03-04 | End: 2024-03-04 | Stop reason: HOSPADM

## 2024-03-04 RX ORDER — SODIUM CHLORIDE, SODIUM LACTATE, POTASSIUM CHLORIDE, CALCIUM CHLORIDE 600; 310; 30; 20 MG/100ML; MG/100ML; MG/100ML; MG/100ML
125 INJECTION, SOLUTION INTRAVENOUS CONTINUOUS
Status: DISCONTINUED | OUTPATIENT
Start: 2024-03-04 | End: 2024-03-04

## 2024-03-04 RX ORDER — HYDROMORPHONE HCL/PF 1 MG/ML
0.5 SYRINGE (ML) INJECTION
Status: DISCONTINUED | OUTPATIENT
Start: 2024-03-04 | End: 2024-03-04 | Stop reason: HOSPADM

## 2024-03-04 RX ORDER — ONDANSETRON 2 MG/ML
4 INJECTION INTRAMUSCULAR; INTRAVENOUS ONCE AS NEEDED
Status: DISCONTINUED | OUTPATIENT
Start: 2024-03-04 | End: 2024-03-04 | Stop reason: HOSPADM

## 2024-03-04 RX ORDER — FENTANYL CITRATE/PF 50 MCG/ML
50 SYRINGE (ML) INJECTION
Status: DISCONTINUED | OUTPATIENT
Start: 2024-03-04 | End: 2024-03-04 | Stop reason: HOSPADM

## 2024-03-04 RX ORDER — SUCCINYLCHOLINE/SOD CL,ISO/PF 100 MG/5ML
SYRINGE (ML) INTRAVENOUS AS NEEDED
Status: DISCONTINUED | OUTPATIENT
Start: 2024-03-04 | End: 2024-03-04

## 2024-03-04 RX ORDER — ACETAMINOPHEN 10 MG/ML
1000 INJECTION, SOLUTION INTRAVENOUS
Status: DISCONTINUED | OUTPATIENT
Start: 2024-03-04 | End: 2024-03-04

## 2024-03-04 RX ORDER — FENTANYL CITRATE 50 UG/ML
INJECTION, SOLUTION INTRAMUSCULAR; INTRAVENOUS AS NEEDED
Status: DISCONTINUED | OUTPATIENT
Start: 2024-03-04 | End: 2024-03-04

## 2024-03-04 RX ORDER — FENTANYL CITRATE/PF 50 MCG/ML
25 SYRINGE (ML) INJECTION
Status: DISCONTINUED | OUTPATIENT
Start: 2024-03-04 | End: 2024-03-04 | Stop reason: HOSPADM

## 2024-03-04 RX ORDER — HYDROMORPHONE HCL/PF 1 MG/ML
0.5 SYRINGE (ML) INJECTION EVERY 4 HOURS PRN
Status: DISCONTINUED | OUTPATIENT
Start: 2024-03-04 | End: 2024-03-07

## 2024-03-04 RX ORDER — HYDROMORPHONE HYDROCHLORIDE 2 MG/ML
INJECTION, SOLUTION INTRAMUSCULAR; INTRAVENOUS; SUBCUTANEOUS AS NEEDED
Status: DISCONTINUED | OUTPATIENT
Start: 2024-03-04 | End: 2024-03-04

## 2024-03-04 RX ORDER — ROCURONIUM BROMIDE 10 MG/ML
INJECTION, SOLUTION INTRAVENOUS AS NEEDED
Status: DISCONTINUED | OUTPATIENT
Start: 2024-03-04 | End: 2024-03-04

## 2024-03-04 RX ORDER — METRONIDAZOLE 500 MG/100ML
500 INJECTION, SOLUTION INTRAVENOUS EVERY 8 HOURS
Status: DISCONTINUED | OUTPATIENT
Start: 2024-03-04 | End: 2024-03-04 | Stop reason: HOSPADM

## 2024-03-04 RX ORDER — SODIUM CHLORIDE, SODIUM LACTATE, POTASSIUM CHLORIDE, CALCIUM CHLORIDE 600; 310; 30; 20 MG/100ML; MG/100ML; MG/100ML; MG/100ML
INJECTION, SOLUTION INTRAVENOUS CONTINUOUS PRN
Status: DISCONTINUED | OUTPATIENT
Start: 2024-03-04 | End: 2024-03-04

## 2024-03-04 RX ADMIN — FAMOTIDINE 20 MG: 10 INJECTION, SOLUTION INTRAVENOUS at 21:06

## 2024-03-04 RX ADMIN — HYDROMORPHONE HYDROCHLORIDE 0.5 MG: 2 INJECTION, SOLUTION INTRAMUSCULAR; INTRAVENOUS; SUBCUTANEOUS at 17:12

## 2024-03-04 RX ADMIN — METOROPROLOL TARTRATE 1 MG: 5 INJECTION, SOLUTION INTRAVENOUS at 16:45

## 2024-03-04 RX ADMIN — SUGAMMADEX 400 MG: 100 INJECTION, SOLUTION INTRAVENOUS at 17:51

## 2024-03-04 RX ADMIN — ONDANSETRON 4 MG: 2 INJECTION INTRAMUSCULAR; INTRAVENOUS at 17:51

## 2024-03-04 RX ADMIN — FAMOTIDINE 20 MG: 10 INJECTION, SOLUTION INTRAVENOUS at 08:15

## 2024-03-04 RX ADMIN — LIDOCAINE HYDROCHLORIDE 50 MG: 10 INJECTION, SOLUTION EPIDURAL; INFILTRATION; INTRACAUDAL; PERINEURAL at 13:59

## 2024-03-04 RX ADMIN — Medication 200 MG: at 13:59

## 2024-03-04 RX ADMIN — FENTANYL CITRATE 50 MCG: 50 INJECTION, SOLUTION INTRAMUSCULAR; INTRAVENOUS at 14:33

## 2024-03-04 RX ADMIN — FENTANYL CITRATE 50 MCG: 50 INJECTION, SOLUTION INTRAMUSCULAR; INTRAVENOUS at 13:59

## 2024-03-04 RX ADMIN — ROCURONIUM BROMIDE 30 MG: 10 INJECTION, SOLUTION INTRAVENOUS at 15:46

## 2024-03-04 RX ADMIN — SODIUM CHLORIDE, SODIUM LACTATE, POTASSIUM CHLORIDE, AND CALCIUM CHLORIDE: .6; .31; .03; .02 INJECTION, SOLUTION INTRAVENOUS at 13:57

## 2024-03-04 RX ADMIN — Medication 3000 MG: at 14:12

## 2024-03-04 RX ADMIN — ROCURONIUM BROMIDE 30 MG: 10 INJECTION, SOLUTION INTRAVENOUS at 14:50

## 2024-03-04 RX ADMIN — HYDROMORPHONE HYDROCHLORIDE 0.5 MG: 2 INJECTION, SOLUTION INTRAMUSCULAR; INTRAVENOUS; SUBCUTANEOUS at 15:46

## 2024-03-04 RX ADMIN — HEPARIN SODIUM 7500 UNITS: 5000 INJECTION INTRAVENOUS; SUBCUTANEOUS at 14:10

## 2024-03-04 RX ADMIN — METOROPROLOL TARTRATE 1 MG: 5 INJECTION, SOLUTION INTRAVENOUS at 16:02

## 2024-03-04 RX ADMIN — SODIUM CHLORIDE, SODIUM LACTATE, POTASSIUM CHLORIDE, AND CALCIUM CHLORIDE: .6; .31; .03; .02 INJECTION, SOLUTION INTRAVENOUS at 14:11

## 2024-03-04 RX ADMIN — METOROPROLOL TARTRATE 3 MG: 5 INJECTION, SOLUTION INTRAVENOUS at 14:21

## 2024-03-04 RX ADMIN — FENTANYL CITRATE 100 MCG: 50 INJECTION, SOLUTION INTRAMUSCULAR; INTRAVENOUS at 14:11

## 2024-03-04 RX ADMIN — PROPOFOL 20 MG: 10 INJECTION, EMULSION INTRAVENOUS at 18:30

## 2024-03-04 RX ADMIN — ROCURONIUM BROMIDE 20 MG: 10 INJECTION, SOLUTION INTRAVENOUS at 14:38

## 2024-03-04 RX ADMIN — HEPARIN SODIUM 7500 UNITS: 5000 INJECTION INTRAVENOUS; SUBCUTANEOUS at 21:06

## 2024-03-04 RX ADMIN — ROCURONIUM BROMIDE 50 MG: 10 INJECTION, SOLUTION INTRAVENOUS at 14:10

## 2024-03-04 RX ADMIN — HYDROMORPHONE HYDROCHLORIDE 0.5 MG: 2 INJECTION, SOLUTION INTRAMUSCULAR; INTRAVENOUS; SUBCUTANEOUS at 18:13

## 2024-03-04 RX ADMIN — METOROPROLOL TARTRATE 2.5 MG: 5 INJECTION, SOLUTION INTRAVENOUS at 19:11

## 2024-03-04 RX ADMIN — ROCURONIUM BROMIDE 20 MG: 10 INJECTION, SOLUTION INTRAVENOUS at 16:30

## 2024-03-04 RX ADMIN — HYDROMORPHONE HYDROCHLORIDE 0.5 MG: 2 INJECTION, SOLUTION INTRAMUSCULAR; INTRAVENOUS; SUBCUTANEOUS at 14:56

## 2024-03-04 RX ADMIN — MORPHINE SULFATE 2 MG: 2 INJECTION, SOLUTION INTRAMUSCULAR; INTRAVENOUS at 04:13

## 2024-03-04 RX ADMIN — PROPOFOL 200 MG: 10 INJECTION, EMULSION INTRAVENOUS at 13:59

## 2024-03-04 RX ADMIN — PROPOFOL 30 MG: 10 INJECTION, EMULSION INTRAVENOUS at 18:25

## 2024-03-04 RX ADMIN — Medication 3000 MG: at 18:06

## 2024-03-04 RX ADMIN — HEPARIN SODIUM 7500 UNITS: 5000 INJECTION INTRAVENOUS; SUBCUTANEOUS at 05:41

## 2024-03-04 NOTE — ASSESSMENT & PLAN NOTE
S/p Laproscopic Open incisional hernia repair with mesh. Extenisve lysis of adhesions. Partial omentectomy  POD #1  Care per primary team

## 2024-03-04 NOTE — ASSESSMENT & PLAN NOTE
"/84   Pulse (!) 114   Temp 98.7 °F (37.1 °C)   Resp 16   Ht 5' 4\" (1.626 m)   Wt (!) 160 kg (352 lb 11.8 oz)   SpO2 97%   BMI 60.55 kg/m²   Stable pressures now  In the setting of SBO/incarcerated hernia, blood pressure likely impacted by acute surgical issue   Continue recommendations for IV labetalol 10mg PRN for SBP >180  Continue to follow BP closely and will decide if pt needs PO med at DC  "

## 2024-03-04 NOTE — OP NOTE
OPERATIVE REPORT  PATIENT NAME: Ivonne Marcos    :  1955  MRN: [id number]  Pt Location: [facility] OR ROOM 02    SURGERY DATE: 3/4/2024    Surgeons and Role:     * Jesus Gallo MD - Primary     * Mario Alberto Holloway PA-C - Assisting     * Smith Cunningham PA-C    Preop Diagnosis:  SBO (small bowel obstruction) (HCC) [K56.609]    Post-Op Diagnosis Codes:     * SBO (small bowel obstruction) (HCC) [K56.609]    Procedure(s):  Right - Laproscopic Open incisional hernia repair with mesh. Extenisve lysis of adhesions. Partial omentectomy    Specimen(s):  None    Estimated Blood Loss:   Minimal    Drains:  Closed/Suction Drain Right Abdomen 10 Fr. (Active)   Number of days: 0       Closed/Suction Drain Medial Abdomen Bulb 10 Fr. (Active)   Number of days: 0       NG/OG/Enteral Tube Nasogastric 18 Fr Right nare (Active)   Number of days: 0   KWESI drain to right lower quadrant subcutaneous tissue and KWESI drain to midline subcutaneous tissue    Anesthesia Type:   General    Operative Indications:  SBO (small bowel obstruction) (HCC) [K56.609]    Operative Findings:  During laparoscopy the patient was found to have 2 hernias, above the umbilicus and at the umbilicus, the hernia above the umbilicus measured 3 cm in the hernia below the umbilicus measured 8 cm.  There was incarceration of omentum and small bowel at the umbilical hernia.  There was omentum within the supraumbilical hernia.  I was not able to reduce the umbilical hernia therefore I elected to proceed with open surgery.  There were 2 sites of obstruction in the small bowel within the hernia sac, obstruction secondary to adhesions.  Some of the omentum had to be removed in order for us to reduce the hernia and to prevent further internal hernias.  Lysis of adhesions took 60 minute.  The rest of abdominal cavity was showed no evidence of inflammatory or neoplastic process.    Complications:   None    Procedure and Technique:  The patient was identified and the  patient was placed in the operating table in the supine position.  After adequate esthesia induction and satisfactory endotracheal intubation of the patient the abdomen was prepped and draped under sterile usual fashion with ChloraPrep.  Timeout was called the patient was identified as well surgical site.    An incision was made at Aquino's point, 5 mm trocar was introduced under direct vision with the help of the Visiport.  After verifying the position and the abdomen was insufflated with CO2.  After obtaining adequate pneumoperitoneum the scope was advanced and exploration was performed with above findings.  At this point I proceeded to place a 5 mm trocar on the left side of the abdomen and another 5 mm trocar in the right side of the abdomen.  The omentum from the supraumbilical hernia was reduced with dissectors without any issues.      At this time I elected to proceed to the reduce the hernia at the umbilical site, after several attempts I was not able to reduce either omentum or small bowel from the hernia.  At this point I elected to proceed with open surgery.    Midline incision was made with a scalpel, taken down through subcutaneous tissue with cautery.  The hernia sacs were identified and then we proceeded to enter the hernia sacs at the supraumbilical and umbilical hernias.  At this point the small bowel was inspected without evidence of ischemia.  All the adhesions were carefully taken down using cautery and the small bowel was freed up from the hernia sac.  Lysis of adhesions took 60 minutes.    At this point I proceeded to join both defects by dividing the septum of fascia between them using cautery.  The size of the defect was approximately 12 cm.  At this point I proceeded to divide the omentum using harmonic scalpel that was attached to the hernia sac creating a very friable omentum and multiple defects in the omentum.  Once the omentum was transected the hernia sac was dissected from the  subcutaneous tissue using cautery down to the healthy fascia.  Hernia sac was transected at the fascial level using cautery.    At this point 20 x 10 cm Ventralight mesh was placed and secured with transfascial U-stitch with #1 Nurolon and Davon pledgets.  Right side of the sutures were tied, Interceed was placed in the abdominal cavity to prevent adhesions, fascia was approximated with running #1 strata fix in a continuous fashion, the left side of the #1 Nurolon was tied down.    The patient had a large subcutaneous defect from the large incarcerated hernia on the right side of the abdomen, the space was sutured with 2-0 Vicryl at multiple layers to prevent formation of seroma, KWESI drain was placed into the space.  Subcutaneous tissue at the midline was approximated with 2-0 Vicryl continuous fashion, KWESI drain was placed in the midline incision and brought out through the upper part of the incision.  Skin was closed with a staples.  Sterile dressing was applied.  At the end of the case instrument, and sponge counts were correct, needle count was not correct therefore abdominal x-ray was performed showing no evidence of retained needle.  Patient tolerated the procedure well.   I was present for the entire procedure., A qualified resident physician was not available., and A physician assistant was required during the procedure for retraction, tissue handling, dissection and suturing.    Patient Disposition:  PACU , hemodynamically stable, and extubated and stable        SIGNATURE: Jesus Gallo MD  DATE: March 4, 2024  TIME: 5:58 PM

## 2024-03-04 NOTE — H&P
H&P Exam - General Surgery   Ivonne Marcos 68 y.o. female MRN: [id number]  Unit/Bed#: -01 Encounter: [id number]    Assessment/Plan     Assessment:  68-year-old female with large ventral hernia with SBO secondary to intermittent small bowel incarceration  -Slightly tachycardic    Hypertension    SBO    Morbid obesity  -BMI of 60.55    Plan:  -NPO and IVF  -Analgesics and antiemetics as needed  -Tentative plan for surgical repair today.  -Continue to monitor bowel function      History of Present Illness     HPI:  Ivonne Marcos is a 68 y.o. female morbid obesity, who presents to the emergency department with recurrence of severe abdominal pain, and decreased appetite.  Patient was seen in the emergency department on 2/28/2024 for abdominal pain, nausea, vomiting, but hernia was reducible and patient had return of bowel function so she was discharged home with plan to follow-up outpatient.  Patient has known history of ventral hernia that developed shortly after undergoing laparoscopic cholecystectomy approximately 10 years ago, but it only began causing the symptoms recently, with this being the second time she seeks help from medical providers.  Patient reports of a sedentary lifestyle, and poor diet.  Patient underwent cardiology workup on her last hospitalization and was cleared.  Patient reports that when she came in she had severe generalized abdominal pain, and tenderness over hernia, but did have a bowel movement yesterday, however, no flatus or bowel movements since with lots of belching.    Review of Systems   Constitutional:  Positive for appetite change. Negative for activity change, chills and fever.   HENT:  Negative for congestion, sneezing, sore throat and trouble swallowing.    Eyes:  Negative for photophobia, redness and visual disturbance.   Respiratory:  Negative for apnea, cough and wheezing.    Cardiovascular:  Negative for chest pain, palpitations and leg swelling.    Gastrointestinal:  Positive for abdominal pain and constipation. Negative for abdominal distention, nausea and vomiting.   Endocrine: Negative for polydipsia, polyphagia and polyuria.   Genitourinary:  Negative for difficulty urinating, dysuria, frequency and urgency.   Musculoskeletal:  Negative for arthralgias, back pain and gait problem.   Skin:  Negative for color change, pallor and rash.   Neurological:  Negative for dizziness, seizures, weakness, numbness and headaches.   Psychiatric/Behavioral:  Negative for agitation, behavioral problems and confusion.        Historical Information   History reviewed. No pertinent past medical history.  History reviewed. No pertinent surgical history.  Social History   Social History     Substance and Sexual Activity   Alcohol Use Not Currently     Social History     Substance and Sexual Activity   Drug Use Never     Social History     Tobacco Use   Smoking Status Former    Types: Cigarettes    Start date: 1992    Quit date: 1972    Years since quittin.1   Smokeless Tobacco Never     E-Cigarette/Vaping     E-Cigarette/Vaping Substances     Family History: non-contributory    Meds/Allergies   all medications and allergies reviewed  No Known Allergies    Objective   First Vitals:   Blood Pressure: 167/97 (24)  Pulse: (!) 106 (24)  Temperature: 97.9 °F (36.6 °C) (24)  Temp Source: Oral (24)  Respirations: 20 (24)  Weight - Scale: (!) 160 kg (352 lb 11.8 oz) (24)  SpO2: 95 % (24)    Current Vitals:   Blood Pressure: 147/91 (24)  Pulse: (!) 107 (24)  Temperature: 97.9 °F (36.6 °C) (24)  Temp Source: Oral (24)  Respirations: 19 (24)  Weight - Scale: (!) 160 kg (352 lb 11.8 oz) (24)  SpO2: 95 % (24)      Intake/Output Summary (Last 24 hours) at 3/4/2024 0959  Last data filed at 3/4/2024 0735  Gross per 24 hour    Intake --   Output 400 ml   Net -400 ml       Invasive Devices       Peripheral Intravenous Line  Duration             Peripheral IV 03/03/24 Right Antecubital <1 day                    Physical Exam  Constitutional:       Appearance: She is obese.   HENT:      Head: Normocephalic.   Eyes:      Extraocular Movements: Extraocular movements intact.      Pupils: Pupils are equal, round, and reactive to light.   Cardiovascular:      Rate and Rhythm: Tachycardia present.   Pulmonary:      Effort: Pulmonary effort is normal.   Abdominal:      General: Bowel sounds are normal.      Tenderness: There is no abdominal tenderness. There is no guarding.   Musculoskeletal:         General: Normal range of motion.      Cervical back: Normal range of motion.   Skin:     General: Skin is warm.      Capillary Refill: Capillary refill takes less than 2 seconds.   Neurological:      Mental Status: She is alert and oriented to person, place, and time.   Psychiatric:         Mood and Affect: Mood normal.         Thought Content: Thought content normal.         Judgment: Judgment normal.         Lab Results: I have personally reviewed pertinent lab results.  , CBC:   Lab Results   Component Value Date    WBC 11.80 (H) 03/04/2024    HGB 15.9 (H) 03/04/2024    HCT 45.4 03/04/2024    MCV 89 03/04/2024     03/04/2024    RBC 5.12 03/04/2024    MCH 31.1 03/04/2024    MCHC 35.0 03/04/2024    RDW 11.9 03/04/2024    MPV 10.1 03/04/2024    NRBC 0 03/03/2024   , CMP:   Lab Results   Component Value Date    SODIUM 135 03/03/2024    K 3.7 03/03/2024    CL 99 03/03/2024    CO2 26 03/03/2024    BUN 12 03/03/2024    CREATININE 0.83 03/03/2024    CALCIUM 9.2 03/03/2024    AST 33 03/03/2024    ALT 35 03/03/2024    ALKPHOS 102 03/03/2024    EGFR 72 03/03/2024   , Lipase:   Lab Results   Component Value Date    LIPASE 8 (L) 03/03/2024     Imaging: I have personally reviewed pertinent reports.    EKG, Pathology, and Other Studies: I have personally  reviewed pertinent reports.      Code Status: Level 1 - Full Code  Advance Directive and Living Will:      Power of :    POLST:      Counseling / Coordination of Care  Total floor / unit time spent today 30 minutes.  Greater than 50% of total time was spent with the patient and / or family counseling and / or coordination of care.  A description of the counseling / coordination of care: Plan of care and expectations.

## 2024-03-04 NOTE — ANESTHESIA PREPROCEDURE EVALUATION
Procedure:  REPAIR HERNIA INGUINAL, LAPAROSCOPIC, possible open, possible resection (Right: Groin)    Relevant Problems   CARDIO   (+) Hypertension      Other   (+) Morbid obesity (HCC)        Left Ventricle: Left ventricular cavity size is normal. Wall thickness  is normal. The left ventricular ejection fraction is 55%. Systolic  function is normal. Wall motion is normal. Diastolic function is normal  for age.    Mitral Valve: There is annular calcification.    Aorta: The aortic root is normal in size. The aortic root exhibited  mild fibrocalcific change.    Physical Exam    Airway    Mallampati score: III  TM Distance: >3 FB  Neck ROM: full     Dental       Cardiovascular  Cardiovascular exam normal    Pulmonary  Pulmonary exam normal     Other Findings  post-pubertal.      Anesthesia Plan  ASA Score- 3 Emergent    Anesthesia Type- general with ASA Monitors.         Additional Monitors:     Airway Plan: ETT.           Plan Factors-Exercise tolerance (METS): >4 METS.    Chart reviewed. EKG reviewed. Imaging results reviewed. Existing labs reviewed. Patient summary reviewed.                  Induction- intravenous and rapid sequence induction.    Postoperative Plan- Plan for postoperative opioid use. Planned trial extubation    Informed Consent- Anesthetic plan and risks discussed with patient.  I personally reviewed this patient with the CRNA. Discussed and agreed on the Anesthesia Plan with the CRNA..

## 2024-03-04 NOTE — ASSESSMENT & PLAN NOTE
Recent admission for the same issue, resolved without intervention at that time  Continue care per primary team   Currently NPO with sips of CLD

## 2024-03-04 NOTE — QUICK NOTE
[facility] Note:     Consult has not yet been completed as patient has been in the OR for surgical repair of incarcerated hernia. Consult was placed for hypertension management.  Chart reviewed, patient is a 68-year-old female with recent admission for small bowel obstruction which resolved nonoperatively who presents for recurrence of symptoms concerning for incarcerated hernia causing small bowel obstruction.  Patient has a known history of hypertension without any chronic antihypertensive agents.  Medical history is also significant for morbid obesity.  Patient did have cardiology evaluation last admission for possible preoperative evaluation.  For this, echocardiogram was performed which showed a normal ejection fraction and normal systolic/diastolic functions.  History not yet obtained from patient as she is down in ER still.    Assessment/plan: 68-year-old female presents with small bowel obstruction and incarcerated hernia requiring surgical intervention.      Small bowel obstruction/incarcerated hernia:  -Management per primary team    2.  Hypertension:  -Patient's blood pressures are elevated currently, but this is in the setting of acute surgical issue.  Would recommend for now IV labetalol 10 mg as needed for systolic blood pressures over 180.    Formal consult note to follow when SLIM is able to see the patient tomorrow.  Please feel free to contact our team in the interim.

## 2024-03-04 NOTE — ANESTHESIA POSTPROCEDURE EVALUATION
Post-Op Assessment Note    CV Status:  Stable  Pain Score: 0    Pain management: adequate       Mental Status:  Alert and awake   Hydration Status:  Euvolemic   PONV Controlled:  Controlled   Airway Patency:  Patent and adequate  Two or more mitigation strategies used for obstructive sleep apnea   Post Op Vitals Reviewed: Yes    No anethesia notable event occurred.    Staff: CRNA               BP   137/60   Temp   97.7   Pulse 110   Resp 20   SpO2 98

## 2024-03-05 ENCOUNTER — HOME HEALTH ADMISSION (OUTPATIENT)
Dept: HOME HEALTH SERVICES | Facility: HOME HEALTHCARE | Age: 69
End: 2024-03-05

## 2024-03-05 PROBLEM — E83.51 HYPOCALCEMIA: Status: ACTIVE | Noted: 2024-03-05

## 2024-03-05 LAB
ANION GAP SERPL CALCULATED.3IONS-SCNC: 7 MMOL/L
BUN SERPL-MCNC: 25 MG/DL (ref 5–25)
CALCIUM SERPL-MCNC: 7.7 MG/DL (ref 8.4–10.2)
CHLORIDE SERPL-SCNC: 104 MMOL/L (ref 96–108)
CO2 SERPL-SCNC: 25 MMOL/L (ref 21–32)
CREAT SERPL-MCNC: 1.11 MG/DL (ref 0.6–1.3)
ERYTHROCYTE [DISTWIDTH] IN BLOOD BY AUTOMATED COUNT: 12.4 % (ref 11.6–15.1)
GFR SERPL CREATININE-BSD FRML MDRD: 50 ML/MIN/1.73SQ M
GLUCOSE SERPL-MCNC: 124 MG/DL (ref 65–140)
GLUCOSE SERPL-MCNC: 160 MG/DL (ref 65–140)
GLUCOSE SERPL-MCNC: 164 MG/DL (ref 65–140)
HCT VFR BLD AUTO: 44.5 % (ref 34.8–46.1)
HGB BLD-MCNC: 15.1 G/DL (ref 11.5–15.4)
MCH RBC QN AUTO: 31.1 PG (ref 26.8–34.3)
MCHC RBC AUTO-ENTMCNC: 33.9 G/DL (ref 31.4–37.4)
MCV RBC AUTO: 92 FL (ref 82–98)
PLATELET # BLD AUTO: 239 THOUSANDS/UL (ref 149–390)
PMV BLD AUTO: 10.5 FL (ref 8.9–12.7)
POTASSIUM SERPL-SCNC: 3.9 MMOL/L (ref 3.5–5.3)
RBC # BLD AUTO: 4.86 MILLION/UL (ref 3.81–5.12)
SODIUM SERPL-SCNC: 136 MMOL/L (ref 135–147)
WBC # BLD AUTO: 11.16 THOUSAND/UL (ref 4.31–10.16)

## 2024-03-05 PROCEDURE — 99223 1ST HOSP IP/OBS HIGH 75: CPT | Performed by: FAMILY MEDICINE

## 2024-03-05 PROCEDURE — 85027 COMPLETE CBC AUTOMATED: CPT | Performed by: CLINICAL NURSE SPECIALIST

## 2024-03-05 PROCEDURE — 97163 PT EVAL HIGH COMPLEX 45 MIN: CPT

## 2024-03-05 PROCEDURE — 99024 POSTOP FOLLOW-UP VISIT: CPT | Performed by: PHYSICIAN ASSISTANT

## 2024-03-05 PROCEDURE — 97167 OT EVAL HIGH COMPLEX 60 MIN: CPT

## 2024-03-05 PROCEDURE — 80048 BASIC METABOLIC PNL TOTAL CA: CPT | Performed by: CLINICAL NURSE SPECIALIST

## 2024-03-05 PROCEDURE — 97110 THERAPEUTIC EXERCISES: CPT

## 2024-03-05 PROCEDURE — 82948 REAGENT STRIP/BLOOD GLUCOSE: CPT

## 2024-03-05 RX ORDER — LABETALOL HYDROCHLORIDE 5 MG/ML
10 INJECTION, SOLUTION INTRAVENOUS EVERY 6 HOURS PRN
Status: DISCONTINUED | OUTPATIENT
Start: 2024-03-05 | End: 2024-03-09 | Stop reason: HOSPADM

## 2024-03-05 RX ADMIN — ACETAMINOPHEN 1000 MG: 10 INJECTION INTRAVENOUS at 19:49

## 2024-03-05 RX ADMIN — HEPARIN SODIUM 7500 UNITS: 5000 INJECTION INTRAVENOUS; SUBCUTANEOUS at 14:26

## 2024-03-05 RX ADMIN — ACETAMINOPHEN 1000 MG: 10 INJECTION INTRAVENOUS at 11:31

## 2024-03-05 RX ADMIN — FAMOTIDINE 20 MG: 10 INJECTION, SOLUTION INTRAVENOUS at 21:14

## 2024-03-05 RX ADMIN — SODIUM CHLORIDE, SODIUM LACTATE, POTASSIUM CHLORIDE, AND CALCIUM CHLORIDE 150 ML/HR: .6; .31; .03; .02 INJECTION, SOLUTION INTRAVENOUS at 12:47

## 2024-03-05 RX ADMIN — FAMOTIDINE 20 MG: 10 INJECTION, SOLUTION INTRAVENOUS at 09:27

## 2024-03-05 RX ADMIN — HEPARIN SODIUM 7500 UNITS: 5000 INJECTION INTRAVENOUS; SUBCUTANEOUS at 05:28

## 2024-03-05 RX ADMIN — SODIUM CHLORIDE, SODIUM LACTATE, POTASSIUM CHLORIDE, AND CALCIUM CHLORIDE 150 ML/HR: .6; .31; .03; .02 INJECTION, SOLUTION INTRAVENOUS at 19:24

## 2024-03-05 RX ADMIN — HEPARIN SODIUM 7500 UNITS: 5000 INJECTION INTRAVENOUS; SUBCUTANEOUS at 21:14

## 2024-03-05 RX ADMIN — ACETAMINOPHEN 1000 MG: 10 INJECTION INTRAVENOUS at 02:39

## 2024-03-05 NOTE — APP STUDENT NOTE
"Progress Note - General Surgery   Ivonne Marcos 68 y.o. female MRN: [id number]  Unit/Bed#: -01 Encounter: [id number]    Assessment:  Ivonne Marcos is a 68 y.o. female POD1 s/p laparoscopic converted to open incisional hernia repair with mesh, extensive lysis of adhesions and partial omentectomy.   She states her pain is currently well controlled at rest. She has not ambulated since the procedure. She has not had any N/V. She has not passed any gas or had a BM yet. She has been able to urinate into the pereira catheter w/o difficulty.     Plan:  Continue IV fluids   Continue IV abx   Continue DVT prophylaxis with Heparin  Encourage ambulation minimum 3x/day  Analgesics and anti-emetics as needed  Encourage incentive spirometry 10x/hour minimum while awake  Monitor NG tube output   Monitor KWESI drain output   Keep NPO status and NG tube in place until return of bowel function   Monitor pereira catheter output    Serial abdominal exams    Subjective/Objective    Subjective: No acute events overnight.     Objective:     Blood pressure 133/80, pulse (!) 108, temperature 97.8 °F (36.6 °C), resp. rate 16, height 5' 4\" (1.626 m), weight (!) 160 kg (352 lb 11.8 oz), SpO2 96%.,Body mass index is 60.55 kg/m².      Intake/Output Summary (Last 24 hours) at 3/5/2024 0733  Last data filed at 3/5/2024 0501  Gross per 24 hour   Intake 2600 ml   Output 1980 ml   Net 620 ml       Invasive Devices       Peripheral Intravenous Line  Duration             Peripheral IV 03/03/24 Right Antecubital 1 day    Peripheral IV 03/04/24 Right;Ventral (anterior) Hand <1 day              Drain  Duration             Closed/Suction Drain Medial Abdomen Bulb 10 Fr. <1 day    Closed/Suction Drain Right Abdomen 10 Fr. <1 day    NG/OG/Enteral Tube Nasogastric 18 Fr Right nare <1 day    Urethral Catheter Asept;Non-latex;Straight-tip 16 Fr. <1 day                    Physical Exam:   GEN: NAD  CV: RRR, no m/r/g  Lung: Normal effort, CTA B/L, no " w/r/r  Ab: Soft, minimal appropriate RLQ tenderness, midline incision with clean, dry and intact dressings, KWESI drains with serosanginous fluid, hypoactive bowel sounds   Extrem: No calf tenderness or edema  Neuro: A+Ox3     Lab, Imaging and other studies:CBC:   Lab Results   Component Value Date    WBC 11.16 (H) 03/05/2024    HGB 15.1 03/05/2024    HCT 44.5 03/05/2024    MCV 92 03/05/2024     03/05/2024    RBC 4.86 03/05/2024    MCH 31.1 03/05/2024    MCHC 33.9 03/05/2024    RDW 12.4 03/05/2024    MPV 10.5 03/05/2024   , CMP:   Lab Results   Component Value Date    SODIUM 136 03/05/2024    K 3.9 03/05/2024     03/05/2024    CO2 25 03/05/2024    BUN 25 03/05/2024    CREATININE 1.11 03/05/2024    CALCIUM 7.7 (L) 03/05/2024    EGFR 50 03/05/2024      VTE Pharmacologic Prophylaxis: Heparin  VTE Mechanical Prophylaxis: sequential compression device    Recent Results (from the past 36 hour(s))   Platelet count    Collection Time: 03/03/24  9:53 PM   Result Value Ref Range    Platelets 265 149 - 390 Thousands/uL    MPV 10.3 8.9 - 12.7 fL   CBC and Platelet    Collection Time: 03/04/24  4:33 AM   Result Value Ref Range    WBC 11.80 (H) 4.31 - 10.16 Thousand/uL    RBC 5.12 3.81 - 5.12 Million/uL    Hemoglobin 15.9 (H) 11.5 - 15.4 g/dL    Hematocrit 45.4 34.8 - 46.1 %    MCV 89 82 - 98 fL    MCH 31.1 26.8 - 34.3 pg    MCHC 35.0 31.4 - 37.4 g/dL    RDW 11.9 11.6 - 15.1 %    Platelets 243 149 - 390 Thousands/uL    MPV 10.1 8.9 - 12.7 fL   CBC    Collection Time: 03/05/24  4:43 AM   Result Value Ref Range    WBC 11.16 (H) 4.31 - 10.16 Thousand/uL    RBC 4.86 3.81 - 5.12 Million/uL    Hemoglobin 15.1 11.5 - 15.4 g/dL    Hematocrit 44.5 34.8 - 46.1 %    MCV 92 82 - 98 fL    MCH 31.1 26.8 - 34.3 pg    MCHC 33.9 31.4 - 37.4 g/dL    RDW 12.4 11.6 - 15.1 %    Platelets 239 149 - 390 Thousands/uL    MPV 10.5 8.9 - 12.7 fL   Basic metabolic panel    Collection Time: 03/05/24  4:43 AM   Result Value Ref Range    Sodium 136  135 - 147 mmol/L    Potassium 3.9 3.5 - 5.3 mmol/L    Chloride 104 96 - 108 mmol/L    CO2 25 21 - 32 mmol/L    ANION GAP 7 mmol/L    BUN 25 5 - 25 mg/dL    Creatinine 1.11 0.60 - 1.30 mg/dL    Glucose 164 (H) 65 - 140 mg/dL    Calcium 7.7 (L) 8.4 - 10.2 mg/dL    eGFR 50 ml/min/1.73sq BORIS Ramesh  03/05/24

## 2024-03-05 NOTE — PROGRESS NOTES
"Progress Note - General Surgery   Ivonne Marcos 68 y.o. female MRN: [id number]  Unit/Bed#: -01 Encounter: [id number]    Assessment/Plan  Ivonne Marcos is a 68 y.o. female     POD1 s/p laparoscopic converted to open repair with mesh of incarcerated ventral hernia for SBO  Afebrile, tachycardic, WBC 11.16, Hb 15.1  /24hr, Cr 1.11 from 0.84  NGT 1.0L output recorded   JPx2 80cc serosanguinous output   No flatus/BM, abdomen soft, nondistended, hypoactive bowel sounds, KWESI drains x2 in place draining serosanguinous output, incision covered in dressing, abdominal binder in place     Continue current care, NPO/IVF hydration with NGT decompression, will increase IVF to 150cc/hr given low urine output, continue to monitor  Will keep pereira in place at this time given low urine output  Trend labs, monitor Hb, WBC, and vitals   Abdominal binder to remain in place at all times  I/Os  Serial abdominal exams  Encourage ambulation/OOB  Pain control/Antiemetics PRN  IS 10x an hour  DVT prophylaxis   SLIM following, appreciate their assistance, added IV labetalol PRN per recommendations     Subjective/Objective    Subjective: No acute events overnight     Objective:     Blood pressure 125/84, pulse (!) 114, temperature 98.7 °F (37.1 °C), resp. rate 16, height 5' 4\" (1.626 m), weight (!) 160 kg (352 lb 11.8 oz), SpO2 97%.,Body mass index is 60.55 kg/m².      Intake/Output Summary (Last 24 hours) at 3/5/2024 0917  Last data filed at 3/5/2024 0846  Gross per 24 hour   Intake 2840 ml   Output 1580 ml   Net 1260 ml       Invasive Devices       Peripheral Intravenous Line  Duration             Peripheral IV 03/03/24 Right Antecubital 1 day    Peripheral IV 03/04/24 Right;Ventral (anterior) Hand <1 day              Drain  Duration             Closed/Suction Drain Medial Abdomen Bulb 10 Fr. <1 day    Closed/Suction Drain Right Abdomen 10 Fr. <1 day    NG/OG/Enteral Tube Nasogastric 18 Fr Right nare <1 day    Urethral " "Catheter Asept;Non-latex;Straight-tip 16 Fr. <1 day                    Physical Exam: /84   Pulse (!) 114   Temp 98.7 °F (37.1 °C)   Resp 16   Ht 5' 4\" (1.626 m)   Wt (!) 160 kg (352 lb 11.8 oz)   SpO2 97%   BMI 60.55 kg/m²   General appearance: alert and oriented, in no acute distress  Lungs: clear to auscultation bilaterally  Heart: regular rate and rhythm, S1, S2 normal, no murmur, click, rub or gallop  Abdomen:  soft, obese, nondistended, appropriate incisional tenderness to palpation, midline incision covered with Mepilex dressing, KWESI drain x2 with serosang output, pereira catheter in place draining dark yellow urine , abdominal binder in place   Extremities: extremities normal, warm and well-perfused; no cyanosis, clubbing, or edema    Lab, Imaging and other studies:I have personally reviewed pertinent lab results.     VTE Pharmacologic Prophylaxis: Heparin  VTE Mechanical Prophylaxis: sequential compression device    Recent Results (from the past 36 hour(s))   Platelet count    Collection Time: 03/03/24  9:53 PM   Result Value Ref Range    Platelets 265 149 - 390 Thousands/uL    MPV 10.3 8.9 - 12.7 fL   CBC and Platelet    Collection Time: 03/04/24  4:33 AM   Result Value Ref Range    WBC 11.80 (H) 4.31 - 10.16 Thousand/uL    RBC 5.12 3.81 - 5.12 Million/uL    Hemoglobin 15.9 (H) 11.5 - 15.4 g/dL    Hematocrit 45.4 34.8 - 46.1 %    MCV 89 82 - 98 fL    MCH 31.1 26.8 - 34.3 pg    MCHC 35.0 31.4 - 37.4 g/dL    RDW 11.9 11.6 - 15.1 %    Platelets 243 149 - 390 Thousands/uL    MPV 10.1 8.9 - 12.7 fL   CBC    Collection Time: 03/05/24  4:43 AM   Result Value Ref Range    WBC 11.16 (H) 4.31 - 10.16 Thousand/uL    RBC 4.86 3.81 - 5.12 Million/uL    Hemoglobin 15.1 11.5 - 15.4 g/dL    Hematocrit 44.5 34.8 - 46.1 %    MCV 92 82 - 98 fL    MCH 31.1 26.8 - 34.3 pg    MCHC 33.9 31.4 - 37.4 g/dL    RDW 12.4 11.6 - 15.1 %    Platelets 239 149 - 390 Thousands/uL    MPV 10.5 8.9 - 12.7 fL   Basic metabolic panel "    Collection Time: 03/05/24  4:43 AM   Result Value Ref Range    Sodium 136 135 - 147 mmol/L    Potassium 3.9 3.5 - 5.3 mmol/L    Chloride 104 96 - 108 mmol/L    CO2 25 21 - 32 mmol/L    ANION GAP 7 mmol/L    BUN 25 5 - 25 mg/dL    Creatinine 1.11 0.60 - 1.30 mg/dL    Glucose 164 (H) 65 - 140 mg/dL    Calcium 7.7 (L) 8.4 - 10.2 mg/dL    eGFR 50 ml/min/1.73sq m

## 2024-03-05 NOTE — PLAN OF CARE
Problem: PHYSICAL THERAPY ADULT  Goal: Performs mobility at highest level of function for planned discharge setting.  See evaluation for individualized goals.  Description: Treatment/Interventions: Functional transfer training, LE strengthening/ROM, Therapeutic exercise, Endurance training, Patient/family training, Equipment eval/education, Bed mobility, Gait training, Elevations, Spoke to nursing, OT  Equipment Recommended: Walker (rowena)       See flowsheet documentation for full assessment, interventions and recommendations.  3/5/2024 1054 by Patricia Chaudhari PT  Note: Prognosis: Good  Problem List: Decreased strength, Decreased endurance, Impaired balance, Decreased mobility, Obesity, Pain  Assessment: Pt is 68 y.o. female seen for PT evaluation on 3/5/2024 s/p admit to [facility] on 3/3/2024 w/ Incarcerated incisional hernia. POD1 s/p laparoscopic converted to open repair with mesh of incarcerated ventral hernia for SBO. PT was consulted to assess pt's functional mobility and d/c needs. Order placed for PT eval and tx. PTA, pt resides in 1SH with < 5 JESSE, ambulates without AD at baseline. At time of eval, pt requiring min a x2 for bed mobility and transfers, min A x1 for household distance gait trial with use of rowena RW. Upon evaluation, pt presenting with impaired functional mobility d/t decreased strength, decreased endurance, impaired balance, decreased mobility, obesity c BMI of 60.55 kg/m2, pain, and activity intolerance. Pertinent PMHx and current co-morbidities affecting pt's physical performance at time of assessment include: incarcerated incisional hernia, SBO, morbid obesity, HTN, hypocalcemia. Personal factors affecting pt at time of eval include: ambulating w/ assistive device, stairs to enter home, and inability to navigate community distances. The following objective measures performed on IE also reveal limitations: Barthel Index: 30/100, Modified Ana Rosa: 4 (moderate/severe disability), and  AM-PAC 6-Clicks: 15/24. Pt's clinical presentation is currently unstable/unpredictable seen in pt's presentation of abnormal lab value(s) and ongoing medical assessment. Overall, pt's rehab potential and prognosis to return to PLOF is good as impacted by objective findings, warranting pt to receive further skilled PT interventions to address identified impairments, activity limitation(s), and participation restriction(s). Pt to benefit from continued PT tx to address deficits as defined above and maximize level of functional independent mobility. From PT/mobility standpoint, recommend level 2, moderate resource intensity in order to facilitate return to PLOF.  Barriers to Discharge: Inaccessible home environment, Decreased caregiver support     Rehab Resource Intensity Level, PT: II (Moderate Resource Intensity)    See flowsheet documentation for full assessment.     3/5/2024 1054 by Patricia Chaudhari PT  Note: Prognosis: Good  Problem List: Decreased strength, Decreased endurance, Impaired balance, Decreased mobility, Obesity, Pain  Assessment: Pt is 68 y.o. female seen for PT evaluation on 3/5/2024 s/p admit to [facility] on 3/3/2024 w/ Incarcerated incisional hernia. POD1 s/p laparoscopic converted to open repair with mesh of incarcerated ventral hernia for SBO. PT was consulted to assess pt's functional mobility and d/c needs. Order placed for PT eval and tx. PTA, pt resides in 1SH with < 5 JESSE, ambulates without AD at baseline. At time of eval, pt requiring min a x2 for bed mobility and transfers, min A x1 for household distance gait trial with use of rowena RW. Upon evaluation, pt presenting with impaired functional mobility d/t decreased strength, decreased endurance, impaired balance, decreased mobility, obesity c BMI of 60.55 kg/m2, pain, and activity intolerance. Pertinent PMHx and current co-morbidities affecting pt's physical performance at time of assessment include: incarcerated incisional hernia,  SBO, morbid obesity, HTN, hypocalcemia. Personal factors affecting pt at time of eval include: ambulating w/ assistive device, stairs to enter home, and inability to navigate community distances. The following objective measures performed on IE also reveal limitations: Barthel Index: 30/100, Modified Ana Rosa: 4 (moderate/severe disability), and AM-PAC 6-Clicks: 15/24. Pt's clinical presentation is currently unstable/unpredictable seen in pt's presentation of abnormal lab value(s) and ongoing medical assessment. Overall, pt's rehab potential and prognosis to return to PLOF is good as impacted by objective findings, warranting pt to receive further skilled PT interventions to address identified impairments, activity limitation(s), and participation restriction(s). Pt to benefit from continued PT tx to address deficits as defined above and maximize level of functional independent mobility. From PT/mobility standpoint, recommend level 2, moderate resource intensity in order to facilitate return to PLOF.  Barriers to Discharge: Inaccessible home environment, Decreased caregiver support     Rehab Resource Intensity Level, PT: II (Moderate Resource Intensity)    See flowsheet documentation for full assessment.

## 2024-03-05 NOTE — CONSULTS
"[facility]  Consult  Name: Ivonne Marcos 68 y.o. female I MRN: [id number]  Unit/Bed#: -01 I Date of Admission: 3/3/2024   Date of Service: 3/5/2024 I Hospital Day: 1    Inpatient consult to Internal Medicine  Consult performed by: Freddy Lawler MD  Consult ordered by: Serge De La Torre MD          Assessment/Plan   * Incarcerated incisional hernia  Assessment & Plan  S/p Laproscopic Open incisional hernia repair with mesh. Extenisve lysis of adhesions. Partial omentectomy  POD #1  Care per primary team     Hypertension  Assessment & Plan  /84   Pulse (!) 114   Temp 98.7 °F (37.1 °C)   Resp 16   Ht 5' 4\" (1.626 m)   Wt (!) 160 kg (352 lb 11.8 oz)   SpO2 97%   BMI 60.55 kg/m²   Stable pressures now  In the setting of SBO/incarcerated hernia, blood pressure likely impacted by acute surgical issue   Continue recommendations for IV labetalol 10mg PRN for SBP >180  Continue to follow BP closely and will decide if pt needs PO med at DC    Hypocalcemia  Assessment & Plan  Likely reactive to post op status  Recheck once improved     Morbid obesity (HCC)  Assessment & Plan  BMI>60, contributing to medical complexity  Counseling for weight loss done      Small bowel obstruction (HCC)  Assessment & Plan  Recent admission for the same issue, resolved without intervention at that time  Continue care per primary team   Currently NPO with sips of CLD           VTE Prophylaxis:    heparin    Mobility:   Basic Mobility Inpatient Raw Score: 20  JH-HLM Goal: 6: Walk 10 steps or more  JH-HLM Achieved: 2: Bed activities/Dependent transfer  HLM Goal NOT achieved. Continue with multidisciplinary rounding and encourage appropriate mobility to improve upon HLM goals.    Recommendations for Discharge:  Medically stable, defer dc planning to surgery    Total Time Spent on Date of Encounter in care of patient: 55 mins. This time was spent on one or more of the following: performing physical exam; " counseling and coordination of care; obtaining or reviewing history; documenting in the medical record; reviewing/ordering tests, medications or procedures; communicating with other healthcare professionals and discussing with patient's family/caregivers.    Collaboration of Care: Were Recommendations Directly Discussed with Primary Treatment Team? Yes    History of Present Illness:  Ivonne Marcos is a 68 y.o. female who is originally admitted to the surgical service due to incarcerated hernia and required open incisional hernia repair with mesh. We are consulted for medical management.  Patient seen and examined at bedside, denies any chest pain shortness of breath cough or fever, dizziness or lightheadedness or diaphoresis.    Review of Systems:  Review of Systems   Constitutional:  Negative for chills and fever.   HENT:  Negative for ear pain and sore throat.    Eyes:  Negative for pain and visual disturbance.   Respiratory:  Negative for cough and shortness of breath.    Cardiovascular:  Negative for chest pain and palpitations.   Gastrointestinal:  Positive for abdominal pain. Negative for vomiting.   Genitourinary:  Negative for dysuria and hematuria.   Musculoskeletal:  Negative for arthralgias and back pain.   Skin:  Negative for color change and rash.   Neurological:  Negative for seizures and syncope.   All other systems reviewed and are negative.      Past Medical and Surgical History:   History reviewed. No pertinent past medical history.    Past Surgical History:   Procedure Laterality Date    CHOLECYSTECTOMY      HERNIA REPAIR Right 3/4/2024    Procedure: Laproscopic Open incisional hernia repair with mesh, Extenisve lysis of adhesions, Partial omentectomy;  Surgeon: Jesus Gallo MD;  Location: [facility] OR;  Service: General       Meds/Allergies:  all medications and allergies reviewed    Allergies: No Known Allergies    Social History:  Marital Status: /Civil Union  Substance Use History:  "  Social History     Substance and Sexual Activity   Alcohol Use Not Currently     Social History     Tobacco Use   Smoking Status Former    Types: Cigarettes    Start date: 1992    Quit date: 1972    Years since quittin.2   Smokeless Tobacco Never     Social History     Substance and Sexual Activity   Drug Use Never       Family History:  History reviewed. No pertinent family history.    Physical Exam:   Vitals:   Blood Pressure: 125/84 (24 0749)  Pulse: (!) 114 (24 0751)  Temperature: 98.7 °F (37.1 °C) (24 0749)  Temp Source: Temporal (24 1252)  Respirations: 16 (24 0303)  Height: 5' 4\" (162.6 cm) (24 1252)  Weight - Scale: (!) 160 kg (352 lb 11.8 oz) (24)  SpO2: 97 % (24 0751)    Physical Exam General- Awake, alert and oriented x 3, looks comfortable  HEENT- Normocephalic, atraumatic, oral mucosa- moist, NGT in place  Neck- Supple, No carotid bruit, no JVD  CVS- Normal S1/ S2, Regular rate and rhythm, No murmur, No edema  Respiratory system- B/L clear breath sounds, no wheezing  Abdomen- Soft, distended, expected post op tenderness, Bowel sound- unable to hear on auscultation  Genitourinary- No suprapubic tenderness, No CVA tenderness  Skin- No new bruise or rash  Musculoskeletal- No gross deformity  Psych- No acute psychosis  CNS- CN II- XII grossly intact, No acute focal neurologic deficit noted      Additional Data:   Lab Results:    Results from last 7 days   Lab Units 24  0443 24  2153 24  1747   WBC Thousand/uL 11.16*   < > 11.55*   HEMOGLOBIN g/dL 15.1   < > 16.6*   HEMATOCRIT % 44.5   < > 47.6*   PLATELETS Thousands/uL 239   < > 261   NEUTROS PCT %  --   --  85*   LYMPHS PCT %  --   --  9*   MONOS PCT %  --   --  5   EOS PCT %  --   --  0    < > = values in this interval not displayed.     Results from last 7 days   Lab Units 24  0443 24  1747   SODIUM mmol/L 136 135   POTASSIUM mmol/L 3.9 3.7   CHLORIDE mmol/L " 104 99   CO2 mmol/L 25 26   BUN mg/dL 25 12   CREATININE mg/dL 1.11 0.83   ANION GAP mmol/L 7 10   CALCIUM mg/dL 7.7* 9.2   ALBUMIN g/dL  --  4.1   TOTAL BILIRUBIN mg/dL  --  0.72   ALK PHOS U/L  --  102   ALT U/L  --  35   AST U/L  --  33   GLUCOSE RANDOM mg/dL 164* 154*             Lab Results   Component Value Date/Time    HGBA1C 5.7 (H) 02/27/2024 09:53 PM               Imaging: Reviewed radiology reports from this admission including: xray(s)  XR abdomen 1 view kub   Final Result by Ramses Bean MD (03/05 0847)      There is no evidence of radiopaque retained surgical instrument in the right abdomen or abdominal midline               Workstation performed: [id number]         CT abdomen pelvis with contrast   Final Result by Mane Matthew MD (03/03 2021)      Multiple fluid-filled small bowel loops with transition point within a large lobulated right lower quadrant ventral abdominal wall hernia compatible with small bowel obstruction. There are dilated loops of small bowel which also abruptly change in    caliber at the entrance and exit of the hernia, closed-loop obstruction cannot be excluded. Surgical consultation is recommended.      I personally discussed this study with CATHY BA on 3/3/2024 8:19 PM.      Workstation performed: [id number]             EKG, Pathology, and Other Studies Reviewed on Admission:   EKG: No EKG obtained.    ** Please Note: This note may have been constructed using a voice recognition system. **

## 2024-03-05 NOTE — OCCUPATIONAL THERAPY NOTE
Occupational Therapy Evaluation      Ivonne Marcos    3/5/2024    Patient Active Problem List   Diagnosis    Small bowel obstruction (HCC)    Incarcerated incisional hernia    Morbid obesity (HCC)    Hypertension    Hypocalcemia       History reviewed. No pertinent past medical history.    Past Surgical History:   Procedure Laterality Date    CHOLECYSTECTOMY      HERNIA REPAIR Right 3/4/2024    Procedure: Laproscopic Open incisional hernia repair with mesh, Extenisve lysis of adhesions, Partial omentectomy;  Surgeon: Jesus Gallo MD;  Location: [facility];  Service: General        03/05/24 0826   OT Last Visit   OT Visit Date 03/05/24   Note Type   Note type Evaluation   Additional Comments Pt agreeable to OT eval. Upon arrival pt supine in bed with HOB elevated.   Pain Assessment   Pain Assessment Tool 0-10   Pain Score 8   Pain Location/Orientation Location: Abdomen   Pain Onset/Description Frequency: Intermittent  (c mobility)   Hospital Pain Intervention(s) Ambulation/increased activity;Repositioned;Medication (See MAR)   Restrictions/Precautions   Weight Bearing Precautions Per Order No   Braces or Orthoses   (abdominal binder)   Other Precautions Multiple lines;O2;Fall Risk;Bed Alarm;Chair Alarm;Pain  (2.5 L O2 via NC- not used at baseline)   Home Living   Type of Home House   Home Layout One level;Performs ADLs on one level;Able to live on main level with bedroom/bathroom;Stairs to enter with rails   Bathroom Shower/Tub Walk-in shower   Bathroom Toilet Raised   Bathroom Equipment Grab bars in shower   Bathroom Accessibility Accessible   Home Equipment Walker  (no AD used at baseline)   Prior Function   Level of [place] Independent with ADLs;Independent with functional mobility;Independent with IADLS   Lives With Spouse  (+ 2 dogs)   Receives Help From Family   IADLs Independent with driving;Independent with meal prep;Independent with medication management   Falls in the last 6 months 0    Vocational Retired   Lifestyle   Autonomy Patient reporting being independent with ADLs/IADLs, ambulatory with no AD, and lives with    Reciprocal Relationships    Service to Others Retired   ADL   Grooming Assistance 5  Supervision/Setup   UB Bathing Assistance 4  Minimal Assistance   LB Bathing Assistance 3  Moderate Assistance   UB Dressing Assistance 4  Minimal Assistance   LB Dressing Assistance 3  Moderate Assistance   Toileting Assistance  3  Moderate Assistance   Additional Comments ADL levels based on functional performance during OT eval.   Bed Mobility   Rolling L 3  Moderate assistance   Additional items Assist x 2;HOB elevated;Bedrails;Increased time required;Verbal cues;LE management   Supine to Sit 4  Minimal assistance   Additional items Assist x 2;HOB elevated;Bedrails;Increased time required;Verbal cues;LE management   Sit to Supine   (DNT: pt seated OOB in recliner at end of session)   Additional Comments Log-roll technique utilized for abdominal precautions   Transfers   Sit to Stand 4  Minimal assistance   Additional items Assist x 2;Armrests;Increased time required;Verbal cues   Stand to Sit 4  Minimal assistance   Additional items Assist x 2;Armrests;Increased time required;Verbal cues   Functional Mobility   Functional Mobility 4  Minimal assistance  (Assist of 2)   Additional Comments Pt ambulated in room with no overt SOB. Pt limited by pain   Additional items Rolling walker   Balance   Static Sitting Fair +   Dynamic Sitting Fair   Static Standing Fair -   Dynamic Standing Poor +   Activity Tolerance   Activity Tolerance Patient limited by fatigue;Patient limited by pain   Medical Staff Made Aware Pt seen as a co-eval with PT due to the patient's co-morbidities and clinically unstable presentation indicated by chart review.   RUE Assessment   RUE Assessment WFL  (grossly 4-/5 MMT)   LUE Assessment   LUE Assessment WFL  (grossly 4-/5 MMT)   Hand Function   Gross Motor  Coordination Functional   Fine Motor Coordination Functional   Sensation   Light Touch No apparent deficits   Vision-Basic Assessment   Patient Visual Report   (no acute visual changes reported)   Cognition   Overall Cognitive Status WFL   Arousal/Participation Alert;Responsive;Cooperative   Attention Within functional limits   Orientation Level Oriented X4   Memory Within functional limits   Following Commands Follows all commands and directions without difficulty   Assessment   Limitation Decreased ADL status;Decreased UE strength;Decreased endurance;Decreased self-care trans;Decreased high-level ADLs   Prognosis Good   Assessment Patient is a 68 y.o. female seen for OT evaluation s/p admit to [facility]  on 3/3/2024 w/Incarcerated incisional hernia. Commorbidities affecting patient's functional performance at time of assessment include: SBO, obesity, HTN, and hypocalcemia. Orders placed for OT evaluation and treatment and out of bed to chair. Performed at least two patient identifiers during session including name and wristband.  Prior to admission, Patient reporting being independent with ADLs/IADLs, ambulatory with no AD, and lives with . Personal factors affecting patient at time of initial evaluation include: steps to enter, difficulty performing ADLs, and difficulty performing IADLs. Upon evaluation, patient requires minimal  assist for UB ADLs, moderate assist for LB ADLs, transfers and functional ambulation in room and bathroom with minimal  assist of 2, with the use of Rolling Walker.  Patient is oriented x 4.  Occupational performance is affected by the following deficits: decreased functional reach, decreased muscle strength, dynamic sit/ stand balance deficit with poor standing tolerance time for self care and functional mobility, decreased activity tolerance, increased pain, and delayed righting and equilibrium reactions. Patient to benefit from continued Occupational Therapy  treatment while in the hospital to address deficits as defined above and maximize level of functional independence with ADLs and functional mobility. Occupational Performance areas to address include: grooming , bathing/ shower, dressing, toilet hygiene, transfer to all surfaces, functional ambulation, medication routine/ management, IADLS: Household maintenance, IADLs: safety procedures, and IADLs: meal prep/ clean up. From OT standpoint, recommendation at time of d/c would be Level II (moderate resource intensity).   Goals   Patient Goals to have less pain   Plan   Treatment Interventions ADL retraining;Functional transfer training;UE strengthening/ROM;Endurance training;Patient/family training;Equipment evaluation/education;Compensatory technique education;Activityengagement;Energy conservation   Goal Expiration Date 03/20/24   OT Treatment Day 0   OT Frequency 3-5x/wk   Discharge Recommendation   Rehab Resource Intensity Level, OT II (Moderate Resource Intensity)   AM-PAC Daily Activity Inpatient   Lower Body Dressing 2   Bathing 2   Toileting 2   Upper Body Dressing 3   Grooming 3   Eating 3   Daily Activity Raw Score 15   Daily Activity Standardized Score (Calc for Raw Score >=11) 34.69   AM-PAC Applied Cognition Inpatient   Following a Speech/Presentation 4   Understanding Ordinary Conversation 4   Taking Medications 4   Remembering Where Things Are Placed or Put Away 4   Remembering List of 4-5 Errands 4   Taking Care of Complicated Tasks 4   Applied Cognition Raw Score 24   Applied Cognition Standardized Score 62.21   Modified Ana Rosa Scale   Modified Ana Rosa Scale 4   End of Consult   Patient Position at End of Consult Bedside chair;Bed/Chair alarm activated;All needs within reach   Nurse Communication Nurse aware of consult     GOALS:    *ADL transfers with (I) for inc'd independence with ADLs/purposeful tasks    *UB ADL with (I) for inc'd independence with self cares    *LB ADL with (S) using AE prn for  inc'd independence with self cares    *Toileting with (S) for clothing management and hygiene for return to PLOF with personal care    *Increase stand tolerance x 5  m for inc'd tolerance with standing purposeful tasks    *Participate in 10m UE therex to increase overall stamina/activity tolerance for purposeful tasks    *Bed mobility- (I) for inc'd independence to manage own comfort and initiate EOB & OOB purposeful tasks    *Patient will verbalize 3 safety awareness/ principles to prevent falls in the home setting.     *Patient will verbalize and demonstrate use of energy conservation/deep breathing techniques and work simplification skills during functional activities with no verbal cues.     *Patient will increase OOB/sitting tolerance to 2-4 hours per day to increase participation in self-care and leisure tasks with no s/s of exertion.     *Pt will verbalize and demonstrate understanding of post-op abdominal movement precautions 100% in tx sessions for increased safety and functional mobility.      *Pt will demonstrate use of long handled AE during 100% of tx sessions for increased ADL safety and independence following D/C     FABIOLA Garcia, OTR/L

## 2024-03-05 NOTE — CASE MANAGEMENT
Case Management Assessment & Discharge Planning Note    Patient name Ivonne Marcos  Location /-01 MRN [id number]  : 1955 Date 3/5/2024       Current Admission Date: 3/3/2024  Current Admission Diagnosis:Incarcerated incisional hernia   Patient Active Problem List    Diagnosis Date Noted    Hypocalcemia 2024    Hypertension 2024    Small bowel obstruction (HCC) 2024    Incarcerated incisional hernia 2024    Morbid obesity (HCC) 2024      LOS (days): 1  Geometric Mean LOS (GMLOS) (days): 3.5  Days to GMLOS:2.7     OBJECTIVE:  PATIENT READMITTED TO HOSPITAL  Risk of Unplanned Readmission Score: 11.33         Current admission status: Inpatient       Preferred Pharmacy:   PATIENT/FAMILY REPORTS NO PREFERRED PHARMACY  No address on file      [id number] - [place], [place] - [address]  [address]  [place] [address]  Phone: [phone] Fax: [phone]    Primary Care Provider: No primary care provider on file.    Primary Insurance: MEDICARE  Secondary Insurance:     ASSESSMENT:  Active Health Care Proxies    There are no active Health Care Proxies on file.                 Readmission Root Cause  30 Day Readmission: No    Patient Information  Admitted from:: Home  Mental Status: Alert  During Assessment patient was accompanied by: Spouse  Assessment information provided by:: Patient, Spouse  Primary Caregiver: Self  Support Systems: Self, Spouse/significant other  County of Residence: [place]  What city do you live in?: [place]  Home entry access options. Select all that apply.: Stairs  Number of steps to enter home.: 3  Do the steps have railings?: No  Type of Current Residence: [facility]  Living Arrangements: Lives w/ Spouse/significant other  Is patient a ?: No ( is )    Activities of Daily Living Prior to Admission  Functional Status: Independent  Completes ADLs independently?: Yes  Ambulates independently?: Yes  Does patient  use assisted devices?: No  Does patient currently own DME?: Yes  What DME does the patient currently own?: Walker  Does patient have a history of Outpatient Therapy (PT/OT)?: No  Does the patient have a history of Short-Term Rehab?: No  Does patient have a history of HHC?: No  Does patient currently have HHC?: No         Patient Information Continued  Income Source: Pension/intermediate  Does patient have prescription coverage?: Yes  Does patient receive dialysis treatments?: No  Does patient have a history of substance abuse?: No  Does patient have a history of Mental Health Diagnosis?: No    PHQ 2/9 Screening   Reviewed PHQ 2/9 Depression Screening Score?: No    Means of Transportation  Means of Transport to Appts:: Drives Self      Social Determinants of Health (SDOH)      Flowsheet Row Most Recent Value   Housing Stability    In the last 12 months, was there a time when you were not able to pay the mortgage or rent on time? N   In the last 12 months, how many places have you lived? 1   In the last 12 months, was there a time when you did not have a steady place to sleep or slept in a shelter (including now)? N   Transportation Needs    In the past 12 months, has lack of transportation kept you from medical appointments or from getting medications? no   In the past 12 months, has lack of transportation kept you from meetings, work, or from getting things needed for daily living? No   Food Insecurity    Within the past 12 months, you worried that your food would run out before you got the money to buy more. Never true   Within the past 12 months, the food you bought just didn't last and you didn't have money to get more. Never true   Utilities    In the past 12 months has the electric, gas, oil, or water company threatened to shut off services in your home? No            DISCHARGE DETAILS:    Discharge planning discussed with:: Patient & spouse at the bedside  Freedom of Choice: Yes  Comments - Freedom of Choice:  FOC maintained in discussion regarding discharge planning. Patient is alert oriented and competent to make decisions. Introduced self and role, explained role of CM in arranging services such as DME, STR, HHC, and providing community resource information. Discussed current living situation and needs at discharge. Patient is IPTA. CM offered HHC, STR, DME, PCP, OP CM, community resources. Patient agreed to VNA, but is thinking it will not be necessary- depends on if drains will remain in place after discharge. Does not need DME. Pt is aware and encouraged to seek CM for any questions or concerns. CM continues to follow.  CM contacted family/caregiver?: Yes  Were Treatment Team discharge recommendations reviewed with patient/caregiver?: Yes  Did patient/caregiver verbalize understanding of patient care needs?: Yes  Were patient/caregiver advised of the risks associated with not following Treatment Team discharge recommendations?: Yes    Contacts  Patient Contacts: Ace Marcos (Spouse)  [phone] (Mobile)  Relationship to Patient:: Family  Contact Method: In Person  Reason/Outcome: Continuity of Care, Emergency Contact, Discharge Planning    Requested Home Health Care         Is the patient interested in HHC at discharge?: Yes  Home Health Discipline requested:: Nursing  Home Health Follow-Up Provider:: PCP  Home Health Services Needed:: Wound/Ostomy Care, Post-Op Care and Assessment  Homebound Criteria Met:: Requires the Assistance of Another Person for Safe Ambulation or to Leave the Home  Supporting Clincal Findings:: Fatigues Easliy in Short Distances, Limited Endurance    DME Referral Provided  Referral made for DME?: No    Other Referral/Resources/Interventions Provided:  Interventions: HHC  Referral Comments: [facility] referral in [place] for VNA. CM will continue to follow for needs.    Would you like to participate in our [facility] service program?  : No - Declined       Discharge Destination Plan::  Home with Home Health Care  Transport at Discharge : Family

## 2024-03-05 NOTE — PHYSICAL THERAPY NOTE
Physical Therapy Evaluation     Patient's Name: Ivonne Marcos    Admitting Diagnosis  Nausea [R11.0]  Epigastric pain [R10.13]  SBO (small bowel obstruction) (HCC) [K56.609]  Abdominal pain [R10.9]  Generalized abdominal pain [R10.84]    Problem List  Patient Active Problem List   Diagnosis    Small bowel obstruction (HCC)    Incarcerated incisional hernia    Morbid obesity (HCC)    Hypertension    Hypocalcemia       Past Medical History  History reviewed. No pertinent past medical history.    Past Surgical History  Past Surgical History:   Procedure Laterality Date    CHOLECYSTECTOMY      HERNIA REPAIR Right 3/4/2024    Procedure: Laproscopic Open incisional hernia repair with mesh, Extenisve lysis of adhesions, Partial omentectomy;  Surgeon: Jesus Gallo MD;  Location: [facility];  Service: General          03/05/24 0804   PT Last Visit   PT Visit Date 03/05/24   Note Type   Note type Evaluation   Pain Assessment   Pain Assessment Tool 0-10   Pain Score   (discomfort at rest)   Pain Location/Orientation Location: Abdomen  (& throat)   Restrictions/Precautions   Weight Bearing Precautions Per Order No   Braces or Orthoses   (abdominal binder)   Other Precautions Multiple lines;O2;Fall Risk;Bed Alarm;Chair Alarm;Pain  (NGT, 2.5L O2 NC)   Home Living   Type of Home House  (ranch)   Home Layout One level;Stairs to enter with rails;Performs ADLs on one level  (a few JESSE)   Bathroom Shower/Tub Walk-in shower   Bathroom Toilet Raised   Bathroom Equipment Grab bars in shower   Bathroom Accessibility Accessible   Home Equipment Walker  (no AD used at baseline)   Prior Function   Level of [place] Independent with ADLs;Independent with functional mobility;Independent with IADLS   Lives With Spouse  (2 dogs)   Receives Help From Family   IADLs Independent with driving;Independent with meal prep;Independent with medication management   Falls in the last 6 months 0   Vocational Retired   General   Family/Caregiver  Present No   Cognition   Overall Cognitive Status WFL   Arousal/Participation Alert   Orientation Level Oriented X4   Memory Within functional limits   Following Commands Follows all commands and directions without difficulty   Comments pt agreeable to PT evaluation   RLE Assessment   RLE Assessment   (grossly 3+/5 observed with functional mobility)   LLE Assessment   LLE Assessment   (grossly 3+/5 observed with functional mobility)   Coordination   Movements are Fluid and Coordinated 1   Sensation WFL   Bed Mobility   Supine to Sit 4  Minimal assistance  (log roll technique)   Additional items Assist x 2;HOB elevated;Bedrails;Increased time required;Verbal cues;LE management   Transfers   Sit to Stand 4  Minimal assistance   Additional items Assist x 2;Armrests;Increased time required;Verbal cues   Stand to Sit 4  Minimal assistance   Additional items Assist x 2;Armrests;Increased time required;Verbal cues   Ambulation/Elevation   Gait pattern Decreased foot clearance;Antalgic;Short stride;Step to   Gait Assistance 4  Minimal assist   Additional items Assist x 1;Verbal cues;Tactile cues   Assistive Device Bariatric Rolling walker   Distance 30'   Balance   Static Sitting Fair +   Dynamic Sitting Fair   Static Standing Fair -   Dynamic Standing Poor +   Ambulatory Poor +   Endurance Deficit   Endurance Deficit Yes   Activity Tolerance   Activity Tolerance Patient limited by fatigue;Patient limited by pain   Medical Staff Made Aware Pt seen as a co-eval with OT due to the patient's co-morbidities, clinically unstable presentation, and present impairments which are a regression from the patient's baseline.   Nurse Made Aware RENA Guzman   Assessment   Prognosis Good   Problem List Decreased strength;Decreased endurance;Impaired balance;Decreased mobility;Obesity;Pain   Assessment Pt is 68 y.o. female seen for PT evaluation on 3/5/2024 s/p admit to [facility] on 3/3/2024 w/ Incarcerated incisional hernia. POD1 s/p  laparoscopic converted to open repair with mesh of incarcerated ventral hernia for SBO. PT was consulted to assess pt's functional mobility and d/c needs. Order placed for PT eval and tx. PTA, pt resides in 1SH with < 5 JESSE, ambulates without AD at baseline. At time of eval, pt requiring min a x2 for bed mobility and transfers, min A x1 for household distance gait trial with use of rowena RW. Upon evaluation, pt presenting with impaired functional mobility d/t decreased strength, decreased endurance, impaired balance, decreased mobility, obesity c BMI of 60.55 kg/m2, pain, and activity intolerance. Pertinent PMHx and current co-morbidities affecting pt's physical performance at time of assessment include: incarcerated incisional hernia, SBO, morbid obesity, HTN, hypocalcemia. Personal factors affecting pt at time of eval include: ambulating w/ assistive device, stairs to enter home, and inability to navigate community distances. The following objective measures performed on IE also reveal limitations: Barthel Index: 30/100, Modified [place]: 4 (moderate/severe disability), and AM-PAC 6-Clicks: 15/24. Pt's clinical presentation is currently unstable/unpredictable seen in pt's presentation of abnormal lab value(s) and ongoing medical assessment. Overall, pt's rehab potential and prognosis to return to PLOF is good as impacted by objective findings, warranting pt to receive further skilled PT interventions to address identified impairments, activity limitation(s), and participation restriction(s). Pt to benefit from continued PT tx to address deficits as defined above and maximize level of functional independent mobility. From PT/mobility standpoint, recommend level 2, moderate resource intensity in order to facilitate return to PLOF.   Barriers to Discharge Inaccessible home environment;Decreased caregiver support   Goals   Patient Goals to walk   STG Expiration Date 03/15/24   Short Term Goal #1 In 7-10 days: Increase  bilateral LE strength 1/2 grade to facilitate independent mobility, Perform all bed mobility tasks modified independent to decrease caregiver burden, Perform all transfers modified independent to improve independence, Ambulate > 150 ft. with least restrictive assistive device modified independent w/o LOB and w/ normalized gait pattern 100% of the time, Navigate 3 stairs with close S with unilateral handrail to facilitate return to previous living environment, Increase all balance 1/2 grade to decrease risk for falls, and Complete % of the time   PT Treatment Day 1   Plan   Treatment/Interventions Functional transfer training;LE strengthening/ROM;Therapeutic exercise;Endurance training;Patient/family training;Equipment eval/education;Bed mobility;Gait training;Elevations;Spoke to nursing;OT   PT Frequency 3-5x/wk   Discharge Recommendation   Rehab Resource Intensity Level, PT II (Moderate Resource Intensity)   Equipment Recommended Walker  (rowena)   AM-PAC Basic Mobility Inpatient   Turning in Flat Bed Without Bedrails 2   Lying on Back to Sitting on Edge of Flat Bed Without Bedrails 2   Moving Bed to Chair 3   Standing Up From Chair Using Arms 3   Walk in Room 3   Climb 3-5 Stairs With Railing 2   Basic Mobility Inpatient Raw Score 15   Basic Mobility Standardized Score 36.97   Highest Level Of Mobility   JH-HLM Goal 4: Move to chair/commode   JH-HLM Achieved 7: Walk 25 feet or more   Modified [place] Scale   Modified [place] Scale 4   Barthel Index   Feeding 5  (currently NPO)   Bathing 0   Grooming Score 0   Dressing Score 5   Bladder Score 0   Bowels Score 10   Toilet Use Score 5   Transfers (Bed/Chair) Score 5   Mobility (Level Surface) Score 0   Stairs Score 0   Barthel Index Score 30   Additional Treatment Session   Start Time 0819   End Time 0829   Treatment Assessment Pt seen for PT treatment session this date s/p PT eval, consisting of ther ex focused on strengthening. VC for technique. Current goals  and POC remain appropriate, pt continues to have rehab potential and is making progress towards STGs. Pt prognosis for achieving goals is good, pending pt progress with hospitalization/medical status improvements, and indicated by Stimulability and ability to follow directions. PT recommends level 2, moderate resource intensity upon discharge. Pt continues to be functioning below baseline level, and remains limited 2* factors listed above. PT will continue to see pt during current hospitalization in order to address the deficits listed above and provide interventions consistent w/ POC in effort to achieve STGs.   Exercises   Quad Sets Supine;10 reps;AROM;Bilateral   Heelslides Supine;10 reps;AROM;Bilateral   Knee AROM Long Arc Quad Sitting;10 reps;AROM;Bilateral   Ankle Pumps Supine;10 reps;AROM;Bilateral   End of Consult   Patient Position at End of Consult Bedside chair;Bed/Chair alarm activated;All needs within reach         Patricia Chaudhari, PT, DPT

## 2024-03-05 NOTE — PLAN OF CARE
Problem: PAIN - ADULT  Goal: Verbalizes/displays adequate comfort level or baseline comfort level  Description: Interventions:  - Encourage patient to monitor pain and request assistance  - Assess pain using appropriate pain scale  - Administer analgesics based on type and severity of pain and evaluate response  - Implement non-pharmacological measures as appropriate and evaluate response  - Consider cultural and social influences on pain and pain management  - Notify physician/advanced practitioner if interventions unsuccessful or patient reports new pain  Outcome: Progressing     Problem: INFECTION - ADULT  Goal: Absence or prevention of progression during hospitalization  Description: INTERVENTIONS:  - Assess and monitor for signs and symptoms of infection  - Monitor lab/diagnostic results  - Monitor all insertion sites, i.e. indwelling lines, tubes, and drains  - Monitor endotracheal if appropriate and nasal secretions for changes in amount and color  - [place] appropriate cooling/warming therapies per order  - Administer medications as ordered  - Instruct and encourage patient and family to use good hand hygiene technique  - Identify and instruct in appropriate isolation precautions for identified infection/condition  Outcome: Progressing  Goal: Absence of fever/infection during neutropenic period  Description: INTERVENTIONS:  - Monitor WBC    Outcome: Progressing     Problem: SAFETY ADULT  Goal: Patient will remain free of falls  Description: INTERVENTIONS:  - Educate patient/family on patient safety including physical limitations  - Instruct patient to call for assistance with activity   - Consult OT/PT to assist with strengthening/mobility   - Keep Call bell within reach  - Keep bed low and locked with side rails adjusted as appropriate  - Keep care items and personal belongings within reach  - Initiate and maintain comfort rounds  - Make Fall Risk Sign visible to staff  - Apply yellow socks and bracelet  for high fall risk patients  - Consider moving patient to room near nurses station  Outcome: Progressing  Goal: Maintain or return to baseline ADL function  Description: INTERVENTIONS:  -  Assess patient's ability to carry out ADLs; assess patient's baseline for ADL function and identify physical deficits which impact ability to perform ADLs (bathing, care of mouth/teeth, toileting, grooming, dressing, etc.)  - Assess/evaluate cause of self-care deficits   - Assess range of motion  - Assess patient's mobility; develop plan if impaired  - Assess patient's need for assistive devices and provide as appropriate  - Encourage maximum independence but intervene and supervise when necessary  - Involve family in performance of ADLs  - Assess for home care needs following discharge   - Consider OT consult to assist with ADL evaluation and planning for discharge  - Provide patient education as appropriate  Outcome: Progressing  Goal: Maintains/Returns to pre admission functional level  Description: INTERVENTIONS:  - Perform AM-PAC 6 Click Basic Mobility/ Daily Activity assessment daily.  - Set and communicate daily mobility goal to care team and patient/family/caregiver.   - Collaborate with rehabilitation services on mobility goals if consulted  - Perform Range of Motion 4 times a day.  - Reposition patient every 2 hours.  - Dangle patient 3 times a day  - Stand patient 3 times a day  - Ambulate patient 3 times a day  - Out of bed to chair 3 times a day   - Out of bed for meals 3 times a day  - Out of bed for toileting  - Record patient progress and toleration of activity level   Outcome: Progressing     Problem: DISCHARGE PLANNING  Goal: Discharge to home or other facility with appropriate resources  Description: INTERVENTIONS:  - Identify barriers to discharge w/patient and caregiver  - Arrange for needed discharge resources and transportation as appropriate  - Identify discharge learning needs (meds, wound care, etc.)  -  Arrange for interpretive services to assist at discharge as needed  - Refer to Case Management Department for coordinating discharge planning if the patient needs post-hospital services based on physician/advanced practitioner order or complex needs related to functional status, cognitive ability, or social support system  Outcome: Progressing     Problem: Knowledge Deficit  Goal: Patient/family/caregiver demonstrates understanding of disease process, treatment plan, medications, and discharge instructions  Description: Complete learning assessment and assess knowledge base.  Interventions:  - Provide teaching at level of understanding  - Provide teaching via preferred learning methods  Outcome: Progressing

## 2024-03-05 NOTE — RESPIRATORY THERAPY NOTE
RT Protocol Note  Ivonne Marcos 68 y.o. female MRN: [id number]  Unit/Bed#: -01 Encounter: [id number]    Assessment    Active Problems:    Small bowel obstruction (HCC)    Incarcerated incisional hernia    Morbid obesity (HCC)    Hypertension      Home Pulmonary Medications:  N/a         History reviewed. No pertinent past medical history.  Social History     Socioeconomic History    Marital status: /Civil Union     Spouse name: None    Number of children: None    Years of education: None    Highest education level: None   Occupational History    None   Tobacco Use    Smoking status: Former     Types: Cigarettes     Start date: 1992     Quit date: 1972     Years since quittin.1    Smokeless tobacco: Never   Substance and Sexual Activity    Alcohol use: Not Currently    Drug use: Never    Sexual activity: None   Other Topics Concern    None   Social History Narrative    None     Social Determinants of Health     Financial Resource Strain: Not on file   Food Insecurity: No Food Insecurity (2024)    Hunger Vital Sign     Worried About Running Out of Food in the Last Year: Never true     Ran Out of Food in the Last Year: Never true   Transportation Needs: No Transportation Needs (2024)    PRAPARE - Transportation     Lack of Transportation (Medical): No     Lack of Transportation (Non-Medical): No   Physical Activity: Not on file   Stress: Not on file   Social Connections: Not on file   Intimate Partner Violence: Not on file   Housing Stability: Low Risk  (2024)    Housing Stability Vital Sign     Unable to Pay for Housing in the Last Year: No     Number of Places Lived in the Last Year: 1     Unstable Housing in the Last Year: No       Subjective    Subjective Data: awake and alert    Objective    Physical Exam:   Assessment Type: Assess only  General Appearance: Alert, Awake  Respiratory Pattern: Shallow, Spontaneous  Chest Assessment: Chest expansion symmetrical  Bilateral  "Breath Sounds: Clear, Diminished  Cough: None  O2 Device: room air    Vitals:  Blood pressure 136/87, pulse (!) 108, temperature 97.8 °F (36.6 °C), resp. rate 16, height 5' 4\" (1.626 m), weight (!) 160 kg (352 lb 11.8 oz), SpO2 96%.          Imaging and other studies: I have personally reviewed pertinent reports.      O2 Device: room air     Plan    Respiratory Plan: No distress/Pulmonary history        Resp Comments: respiratory protocol completed patient being admitted for small bowel obstruction requiring surgery to repair. patient without significant pulmonary PMH no medication used at home. pateint resting without apparent respiratory distress no indication or request for medication neeed at this time nothing further from respiratory needed   "

## 2024-03-05 NOTE — PLAN OF CARE
Problem: OCCUPATIONAL THERAPY ADULT  Goal: Performs self-care activities at highest level of function for planned discharge setting.  See evaluation for individualized goals.  Description: Treatment Interventions: ADL retraining, Functional transfer training, UE strengthening/ROM, Endurance training, Patient/family training, Equipment evaluation/education, Compensatory technique education, Activityengagement, Energy conservation          See flowsheet documentation for full assessment, interventions and recommendations.   Note: Limitation: Decreased ADL status, Decreased UE strength, Decreased endurance, Decreased self-care trans, Decreased high-level ADLs  Prognosis: Good  Assessment: Patient is a 68 y.o. female seen for OT evaluation s/p admit to [facility]  on 3/3/2024 w/Incarcerated incisional hernia. Commorbidities affecting patient's functional performance at time of assessment include: SBO, obesity, HTN, and hypocalcemia. Orders placed for OT evaluation and treatment and out of bed to chair. Performed at least two patient identifiers during session including name and wristband.  Prior to admission, Patient reporting being independent with ADLs/IADLs, ambulatory with no AD, and lives with . Personal factors affecting patient at time of initial evaluation include: steps to enter, difficulty performing ADLs, and difficulty performing IADLs. Upon evaluation, patient requires minimal  assist for UB ADLs, moderate assist for LB ADLs, transfers and functional ambulation in room and bathroom with minimal  assist of 2, with the use of Rolling Walker.  Patient is oriented x 4.  Occupational performance is affected by the following deficits: decreased functional reach, decreased muscle strength, dynamic sit/ stand balance deficit with poor standing tolerance time for self care and functional mobility, decreased activity tolerance, increased pain, and delayed righting and equilibrium reactions.  Patient to benefit from continued Occupational Therapy treatment while in the hospital to address deficits as defined above and maximize level of functional independence with ADLs and functional mobility. Occupational Performance areas to address include: grooming , bathing/ shower, dressing, toilet hygiene, transfer to all surfaces, functional ambulation, medication routine/ management, IADLS: Household maintenance, IADLs: safety procedures, and IADLs: meal prep/ clean up. From OT standpoint, recommendation at time of d/c would be Level II (moderate resource intensity).     Rehab Resource Intensity Level, OT: II (Moderate Resource Intensity)     Sarah HICKS, OTR/L

## 2024-03-06 ENCOUNTER — APPOINTMENT (INPATIENT)
Dept: RADIOLOGY | Facility: HOSPITAL | Age: 69
DRG: 336 | End: 2024-03-06
Payer: MEDICARE

## 2024-03-06 PROBLEM — E83.42 HYPOMAGNESEMIA: Status: ACTIVE | Noted: 2024-03-06

## 2024-03-06 PROBLEM — E87.1 HYPONATREMIA: Status: ACTIVE | Noted: 2024-03-06

## 2024-03-06 PROBLEM — R06.02 SHORTNESS OF BREATH: Status: ACTIVE | Noted: 2024-03-06

## 2024-03-06 LAB
25(OH)D3 SERPL-MCNC: <7 NG/ML (ref 30–100)
ANION GAP SERPL CALCULATED.3IONS-SCNC: 10 MMOL/L
ANION GAP SERPL CALCULATED.3IONS-SCNC: 16 MMOL/L
BASOPHILS # BLD AUTO: 0.01 THOUSANDS/ÂΜL (ref 0–0.1)
BASOPHILS NFR BLD AUTO: 0 % (ref 0–1)
BUN SERPL-MCNC: 10 MG/DL (ref 5–25)
BUN SERPL-MCNC: 21 MG/DL (ref 5–25)
CA-I BLD-SCNC: 0.95 MMOL/L (ref 1.12–1.32)
CALCIUM SERPL-MCNC: 6.2 MG/DL (ref 8.4–10.2)
CALCIUM SERPL-MCNC: 8.7 MG/DL (ref 8.4–10.2)
CHLORIDE SERPL-SCNC: 102 MMOL/L (ref 96–108)
CHLORIDE SERPL-SCNC: 105 MMOL/L (ref 96–108)
CO2 SERPL-SCNC: 11 MMOL/L (ref 21–32)
CO2 SERPL-SCNC: 25 MMOL/L (ref 21–32)
CREAT SERPL-MCNC: 0.77 MG/DL (ref 0.6–1.3)
CREAT SERPL-MCNC: <0.2 MG/DL (ref 0.6–1.3)
EOSINOPHIL # BLD AUTO: 0 THOUSAND/ÂΜL (ref 0–0.61)
EOSINOPHIL NFR BLD AUTO: 0 % (ref 0–6)
ERYTHROCYTE [DISTWIDTH] IN BLOOD BY AUTOMATED COUNT: 12.5 % (ref 11.6–15.1)
GFR SERPL CREATININE-BSD FRML MDRD: 79 ML/MIN/1.73SQ M
GLUCOSE SERPL-MCNC: 114 MG/DL (ref 65–140)
GLUCOSE SERPL-MCNC: 116 MG/DL (ref 65–140)
GLUCOSE SERPL-MCNC: 119 MG/DL (ref 65–140)
GLUCOSE SERPL-MCNC: 122 MG/DL (ref 65–140)
GLUCOSE SERPL-MCNC: 124 MG/DL (ref 65–140)
GLUCOSE SERPL-MCNC: 53 MG/DL (ref 65–140)
HCT VFR BLD AUTO: 39.2 % (ref 34.8–46.1)
HGB BLD-MCNC: 13 G/DL (ref 11.5–15.4)
IMM GRANULOCYTES # BLD AUTO: 0.08 THOUSAND/UL (ref 0–0.2)
IMM GRANULOCYTES NFR BLD AUTO: 1 % (ref 0–2)
LYMPHOCYTES # BLD AUTO: 1.43 THOUSANDS/ÂΜL (ref 0.6–4.47)
LYMPHOCYTES NFR BLD AUTO: 12 % (ref 14–44)
MAGNESIUM SERPL-MCNC: 0.8 MG/DL (ref 1.9–2.7)
MAGNESIUM SERPL-MCNC: 2.6 MG/DL (ref 1.9–2.7)
MCH RBC QN AUTO: 31 PG (ref 26.8–34.3)
MCHC RBC AUTO-ENTMCNC: 33.2 G/DL (ref 31.4–37.4)
MCV RBC AUTO: 94 FL (ref 82–98)
MONOCYTES # BLD AUTO: 0.87 THOUSAND/ÂΜL (ref 0.17–1.22)
MONOCYTES NFR BLD AUTO: 7 % (ref 4–12)
NEUTROPHILS # BLD AUTO: 9.33 THOUSANDS/ÂΜL (ref 1.85–7.62)
NEUTS SEG NFR BLD AUTO: 80 % (ref 43–75)
NRBC BLD AUTO-RTO: 0 /100 WBCS
OSMOLALITY UR: 1092 MMOL/KG
PLATELET # BLD AUTO: 225 THOUSANDS/UL (ref 149–390)
PMV BLD AUTO: 10.3 FL (ref 8.9–12.7)
POTASSIUM SERPL-SCNC: 3.9 MMOL/L (ref 3.5–5.3)
POTASSIUM SERPL-SCNC: 3.9 MMOL/L (ref 3.5–5.3)
PTH-INTACT SERPL-MCNC: 68.1 PG/ML (ref 12–88)
RBC # BLD AUTO: 4.19 MILLION/UL (ref 3.81–5.12)
SODIUM 24H UR-SCNC: 15 MOL/L
SODIUM SERPL-SCNC: 132 MMOL/L (ref 135–147)
SODIUM SERPL-SCNC: 137 MMOL/L (ref 135–147)
WBC # BLD AUTO: 11.72 THOUSAND/UL (ref 4.31–10.16)

## 2024-03-06 PROCEDURE — 80048 BASIC METABOLIC PNL TOTAL CA: CPT | Performed by: PHYSICIAN ASSISTANT

## 2024-03-06 PROCEDURE — 82948 REAGENT STRIP/BLOOD GLUCOSE: CPT

## 2024-03-06 PROCEDURE — 99024 POSTOP FOLLOW-UP VISIT: CPT

## 2024-03-06 PROCEDURE — 83735 ASSAY OF MAGNESIUM: CPT | Performed by: FAMILY MEDICINE

## 2024-03-06 PROCEDURE — 82330 ASSAY OF CALCIUM: CPT | Performed by: FAMILY MEDICINE

## 2024-03-06 PROCEDURE — 84300 ASSAY OF URINE SODIUM: CPT | Performed by: FAMILY MEDICINE

## 2024-03-06 PROCEDURE — 97530 THERAPEUTIC ACTIVITIES: CPT

## 2024-03-06 PROCEDURE — 82306 VITAMIN D 25 HYDROXY: CPT | Performed by: FAMILY MEDICINE

## 2024-03-06 PROCEDURE — 71045 X-RAY EXAM CHEST 1 VIEW: CPT

## 2024-03-06 PROCEDURE — 85025 COMPLETE CBC W/AUTO DIFF WBC: CPT | Performed by: PHYSICIAN ASSISTANT

## 2024-03-06 PROCEDURE — 97116 GAIT TRAINING THERAPY: CPT

## 2024-03-06 PROCEDURE — 83970 ASSAY OF PARATHORMONE: CPT | Performed by: FAMILY MEDICINE

## 2024-03-06 PROCEDURE — 99233 SBSQ HOSP IP/OBS HIGH 50: CPT | Performed by: FAMILY MEDICINE

## 2024-03-06 PROCEDURE — 83935 ASSAY OF URINE OSMOLALITY: CPT | Performed by: FAMILY MEDICINE

## 2024-03-06 PROCEDURE — 80048 BASIC METABOLIC PNL TOTAL CA: CPT | Performed by: FAMILY MEDICINE

## 2024-03-06 RX ORDER — MAGNESIUM SULFATE HEPTAHYDRATE 40 MG/ML
2 INJECTION, SOLUTION INTRAVENOUS ONCE
Status: COMPLETED | OUTPATIENT
Start: 2024-03-06 | End: 2024-03-06

## 2024-03-06 RX ORDER — SODIUM CHLORIDE 9 MG/ML
75 INJECTION, SOLUTION INTRAVENOUS CONTINUOUS
Status: DISCONTINUED | OUTPATIENT
Start: 2024-03-06 | End: 2024-03-07

## 2024-03-06 RX ORDER — CALCIUM GLUCONATE 20 MG/ML
2 INJECTION, SOLUTION INTRAVENOUS ONCE
Qty: 100 ML | Refills: 0 | Status: COMPLETED | OUTPATIENT
Start: 2024-03-06 | End: 2024-03-06

## 2024-03-06 RX ADMIN — SODIUM CHLORIDE, SODIUM LACTATE, POTASSIUM CHLORIDE, AND CALCIUM CHLORIDE 150 ML/HR: .6; .31; .03; .02 INJECTION, SOLUTION INTRAVENOUS at 08:12

## 2024-03-06 RX ADMIN — MAGNESIUM SULFATE HEPTAHYDRATE 2 G: 40 INJECTION, SOLUTION INTRAVENOUS at 13:02

## 2024-03-06 RX ADMIN — CALCIUM GLUCONATE 2 G: 20 INJECTION, SOLUTION INTRAVENOUS at 10:55

## 2024-03-06 RX ADMIN — HEPARIN SODIUM 7500 UNITS: 5000 INJECTION INTRAVENOUS; SUBCUTANEOUS at 21:40

## 2024-03-06 RX ADMIN — FAMOTIDINE 20 MG: 10 INJECTION, SOLUTION INTRAVENOUS at 21:40

## 2024-03-06 RX ADMIN — HEPARIN SODIUM 7500 UNITS: 5000 INJECTION INTRAVENOUS; SUBCUTANEOUS at 13:05

## 2024-03-06 RX ADMIN — SODIUM CHLORIDE 75 ML/HR: 0.9 INJECTION, SOLUTION INTRAVENOUS at 14:52

## 2024-03-06 RX ADMIN — ACETAMINOPHEN 1000 MG: 10 INJECTION INTRAVENOUS at 03:35

## 2024-03-06 RX ADMIN — HEPARIN SODIUM 7500 UNITS: 5000 INJECTION INTRAVENOUS; SUBCUTANEOUS at 05:12

## 2024-03-06 RX ADMIN — FAMOTIDINE 20 MG: 10 INJECTION, SOLUTION INTRAVENOUS at 08:12

## 2024-03-06 NOTE — CASE MANAGEMENT
Case Management Progress Note    Patient name Ivonne Marcos  Location /-01 MRN [id number]  : 1955 Date 3/6/2024       LOS (days): 2  Geometric Mean LOS (GMLOS) (days): 3.5  Days to GMLOS:1.6        OBJECTIVE:        Current admission status: Inpatient  Preferred Pharmacy:   PATIENT/FAMILY REPORTS NO PREFERRED PHARMACY  No address on file      [id number] - [place], [place] - [address]  [address]  [place] [address]  Phone: [phone] Fax: [phone]    Primary Care Provider: No primary care provider on file.    Primary Insurance: MEDICARE  Secondary Insurance:     PROGRESS NOTE:  Spoke with patient regarding PCP, she states she is going to set up an appt with Eddie Barnes, but due to readmission to hospital and surgery, she has not been able to. Will once discharged.

## 2024-03-06 NOTE — PROGRESS NOTES
"[facility]  Progress Note  Name: Ivonne Marcos I  MRN: [id number]  Unit/Bed#: -Radha I Date of Admission: 3/3/2024   Date of Service: 3/6/2024 I Hospital Day: 2    Assessment/Plan   * Incarcerated incisional hernia  Assessment & Plan  S/p Laproscopic Open incisional hernia repair with mesh. Extenisve lysis of adhesions. Partial omentectomy  POD #2  Care per primary team     Hypertension  Assessment & Plan  /85   Pulse (!) 116   Temp 97.8 °F (36.6 °C)   Resp 22   Ht 5' 4\" (1.626 m)   Wt (!) 160 kg (352 lb 11.8 oz)   SpO2 94%   BMI 60.55 kg/m²   Stable pressures  In the setting of SBO/incarcerated hernia, blood pressure likely impacted by acute surgical issue   Continue recommendations for IV labetalol 10mg PRN for SBP >180  Continue to follow BP closely and will decide if pt needs PO med at DC    Shortness of breath  Assessment & Plan  Concern for atelectasis versus pneumonia versus fluid overload versus pain from postop status  X-ray obtained-possible mild pulmonary vascular congestion.  No significant volume overload visible.  Await final report  Will hold off on any IV diuresis at this point.  No pneumonia noted  Shortness of breath secondary to deconditioning and pain from postop status with ambulation    Hypomagnesemia  Assessment & Plan  0.8, telemetry  2 g IV magnesium sulfate ordered  Recheck in p.m.    Hyponatremia  Assessment & Plan  Likely dilutional as LR running at 150cc/hr  Obtain urine studies to rule out other causes as it could be pain related siadh    Hypocalcemia  Assessment & Plan  Ionized calcium 0.95  Further drop in serum calcium from yesterday 6.2 (7.7), could be from dilution versus true hypocalcemia.  Check vitamin D25 hydroxy levels, intact PTH levels  Ordered 2 g IV calcium gluconate  Monitor on telemetry  Check magnesium levels  Recommend reducing IV fluid rate    Small bowel obstruction (HCC)  Assessment & Plan  Recent admission for the same " issue, resolved without intervention at that time  Continue care per primary team   Currently NPO with sips of CLD           VTE Pharmacologic Prophylaxis:    heparin    Mobility:   Basic Mobility Inpatient Raw Score: 15  JH-HLM Goal: 4: Move to chair/commode  JH-HLM Achieved: 7: Walk 25 feet or more  HLM Goal achieved. Continue to encourage appropriate mobility.    Patient Centered Rounds: I performed bedside rounds with nursing staff today.   Discussions with Specialists or Other Care Team Provider: surgery    Education and Discussions with Family / Patient: Updated  () at bedside.    Total Time Spent on Date of Encounter in care of patient: 45 mins. This time was spent on one or more of the following: performing physical exam; counseling and coordination of care; obtaining or reviewing history; documenting in the medical record; reviewing/ordering tests, medications or procedures; communicating with other healthcare professionals and discussing with patient's family/caregivers.    Current Length of Stay: 2 day(s)  Current Patient Status: Inpatient   Certification Statement: The patient will continue to require additional inpatient hospital stay due to ongoing electrolyte abnormalities and treatment  Discharge Plan: [facility] is following this patient on consult. They are not yet medically stable for discharge secondary to ongoing electrolyte abnormalities.    Code Status: Level 1 - Full Code    Subjective:   Patient seen and examined at bedside during a.m. rounds.  Patient is resting comfortably in bed.  Found to be short of breath and x-ray was done which does shows mild congestion.  No significant volume overload.  Patient denies chest pain and shortness of breath is improved at rest.    Objective:     Vitals:   Temp (24hrs), Av °F (36.7 °C), Min:97.8 °F (36.6 °C), Max:98.1 °F (36.7 °C)    Temp:  [97.8 °F (36.6 °C)-98.1 °F (36.7 °C)] 97.8 °F (36.6 °C)  HR:  [115-116] 116  Resp:  [22] 22  BP:  (133-152)/(85-90) 152/90  SpO2:  [94 %-96 %] 94 %  Body mass index is 60.55 kg/m².     Input and Output Summary (last 24 hours):     Intake/Output Summary (Last 24 hours) at 3/6/2024 1436  Last data filed at 3/6/2024 1302  Gross per 24 hour   Intake 1397.5 ml   Output 1020 ml   Net 377.5 ml       Physical Exam:   Physical Exam General- Awake, alert and oriented x 3, looks comfortable  HEENT- Normocephalic, atraumatic, oral mucosa- moist, NG tube in place  Neck- Supple, No carotid bruit, no JVD  CVS- Normal S1/ S2, Regular rate and rhythm, No murmur, minimal chronic trace to +1 edema  Respiratory system- B/L clear breath sounds, no wheezing, diminished at bases bilaterally  Abdomen- Soft, Non distended, expected postop tenderness, Bowel sound-unable to hear on auscultation  Genitourinary- No suprapubic tenderness, No CVA tenderness  Skin- No new bruise or rash  Musculoskeletal- No gross deformity  Psych- No acute psychosis  CNS- CN II- XII grossly intact, No acute focal neurologic deficit noted      Additional Data:     Labs:  Results from last 7 days   Lab Units 03/06/24  0948   WBC Thousand/uL 11.72*   HEMOGLOBIN g/dL 13.0   HEMATOCRIT % 39.2   PLATELETS Thousands/uL 225   NEUTROS PCT % 80*   LYMPHS PCT % 12*   MONOS PCT % 7   EOS PCT % 0     Results from last 7 days   Lab Units 03/06/24  0452 03/05/24  0443 03/03/24  1747   SODIUM mmol/L 132*   < > 135   POTASSIUM mmol/L 3.9   < > 3.7   CHLORIDE mmol/L 105   < > 99   CO2 mmol/L 11*   < > 26   BUN mg/dL 10   < > 12   CREATININE mg/dL <0.20*   < > 0.83   ANION GAP mmol/L 16   < > 10   CALCIUM mg/dL 6.2*   < > 9.2   ALBUMIN g/dL  --   --  4.1   TOTAL BILIRUBIN mg/dL  --   --  0.72   ALK PHOS U/L  --   --  102   ALT U/L  --   --  35   AST U/L  --   --  33   GLUCOSE RANDOM mg/dL 53*   < > 154*    < > = values in this interval not displayed.         Results from last 7 days   Lab Units 03/06/24  0453 03/05/24  2316 03/05/24  1207   POC GLUCOSE mg/dl 122 124 160*                Lines/Drains:  Invasive Devices       Peripheral Intravenous Line  Duration             Peripheral IV 03/03/24 Right Antecubital 2 days    Peripheral IV 03/04/24 Right;Ventral (anterior) Hand 2 days    Peripheral IV 03/04/24 Dorsal (posterior);Left Hand 1 day              Drain  Duration             NG/OG/Enteral Tube Nasogastric 18 Fr Right nare 2 days    Closed/Suction Drain Medial Abdomen Bulb 10 Fr. 1 day    Closed/Suction Drain Right Abdomen 10 Fr. 1 day    Urethral Catheter Asept;Non-latex;Straight-tip 16 Fr. 1 day                  Urinary Catheter:  Goal for removal:  Defer to primary team               Imaging: Personally reviewed the following imaging: chest xray    Recent Cultures (last 7 days):         Last 24 Hours Medication List:   Current Facility-Administered Medications   Medication Dose Route Frequency Provider Last Rate    acetaminophen  650 mg Oral Q6H PRN Smith S Marisa, PA-C      docusate sodium  100 mg Oral BID Smith S Marisa, PA-C      famotidine  20 mg Intravenous Q12H RANDALL Smith S Marisa, PA-C      heparin (porcine)  7,500 Units Subcutaneous Q8H RANDALL Smith S Marisa, PA-C      HYDROmorphone  0.5 mg Intravenous Q4H PRN Smith S Marisa, PA-C      HYDROmorphone  1 mg Intravenous Q6H PRN Smith S Marisa, PA-C      labetalol  10 mg Intravenous Q6H PRN Yusra Ni, PA-C      magnesium sulfate  2 g Intravenous Once Scarlett Mo PA-C 2 g (03/06/24 1302)    morphine injection  2 mg Intravenous Q4H PRN Smith S Marisa, PA-C      naloxone  0.04 mg Intravenous Q1MIN PRN Smith S Marisa, PA-C      ondansetron  4 mg Intravenous Q6H PRN Smith S Marisa, PA-C      sodium chloride  75 mL/hr Intravenous Continuous Glo Loera PA-C          Today, Patient Was Seen By: Freddy Lawler MD    **Please Note: This note may have been constructed using a voice recognition system.**

## 2024-03-06 NOTE — APP STUDENT NOTE
"Progress Note - General Surgery   Ivonne Marcos 68 y.o. female MRN: [id number]  Unit/Bed#: -01 Encounter: [id number]    Assessment:  Ivonne Marcos is a 68 y.o. female POD2 s/p laparoscopic converted to open incisional hernia repair with mesh, extensive lysis of adhesions and partial omentectomy.   She states her pain is currently well controlled. She has been able to ambulate with minimal pain. She has been urinating into the pereira catheter w/o issues and started to pass minimal flatus but no BM yet. Denies N/V.     Plan:  Continue DVT prophylaxis with Heparin  Encourage ambulation minimum 3x/day  Analgesics and anti-emetics as needed  Encourage incentive spirometry 10x/hour minimum while awake  Monitor NG tube output   Monitor KWESI drain output   Keep NPO status and NG tube in place until return of bowel function   Continue IV fluids     Subjective/Objective    Subjective: No acute events overnight.     Objective:     Blood pressure 134/85, pulse (!) 116, temperature 98.1 °F (36.7 °C), temperature source Oral, resp. rate 22, height 5' 4\" (1.626 m), weight (!) 160 kg (352 lb 11.8 oz), SpO2 94%.,Body mass index is 60.55 kg/m².      Intake/Output Summary (Last 24 hours) at 3/6/2024 0723  Last data filed at 3/5/2024 2122  Gross per 24 hour   Intake 2522.5 ml   Output 1015 ml   Net 1507.5 ml       Invasive Devices       Peripheral Intravenous Line  Duration             Peripheral IV 03/03/24 Right Antecubital 2 days    Peripheral IV 03/04/24 Dorsal (posterior);Left Hand 1 day    Peripheral IV 03/04/24 Right;Ventral (anterior) Hand 1 day              Drain  Duration             Closed/Suction Drain Medial Abdomen Bulb 10 Fr. 1 day    Closed/Suction Drain Right Abdomen 10 Fr. 1 day    NG/OG/Enteral Tube Nasogastric 18 Fr Right nare 1 day    Urethral Catheter Asept;Non-latex;Straight-tip 16 Fr. 1 day                    Physical Exam:   GEN: NAD  CV: tachycardic but regular rhythm, no m/r/g  Lung: Normal effort, " "CTA B/L, no w/r/r  Ab: Soft, minimal appropriate diffuse tenderness, midline incision with clean, dry and intact dressings, port site incisions with clean/dry dressings, KWESI drains with serosanginous fluid, hypoactive bowel sounds   Extrem: no calf tenderness or edema  Neuro: A+Ox3     Lab, Imaging and other studies:CBC: No results found for: \"WBC\", \"HGB\", \"HCT\", \"MCV\", \"PLT\", \"ADJUSTEDWBC\", \"RBC\", \"MCH\", \"MCHC\", \"RDW\", \"MPV\", \"NRBC\", CMP:   Lab Results   Component Value Date    SODIUM 132 (L) 03/06/2024    K 3.9 03/06/2024     03/06/2024    CO2 11 (L) 03/06/2024    BUN 10 03/06/2024    CREATININE <0.20 (L) 03/06/2024    CALCIUM 6.2 (L) 03/06/2024      VTE Pharmacologic Prophylaxis: Heparin  VTE Mechanical Prophylaxis: sequential compression device    Recent Results (from the past 36 hour(s))   CBC    Collection Time: 03/05/24  4:43 AM   Result Value Ref Range    WBC 11.16 (H) 4.31 - 10.16 Thousand/uL    RBC 4.86 3.81 - 5.12 Million/uL    Hemoglobin 15.1 11.5 - 15.4 g/dL    Hematocrit 44.5 34.8 - 46.1 %    MCV 92 82 - 98 fL    MCH 31.1 26.8 - 34.3 pg    MCHC 33.9 31.4 - 37.4 g/dL    RDW 12.4 11.6 - 15.1 %    Platelets 239 149 - 390 Thousands/uL    MPV 10.5 8.9 - 12.7 fL   Basic metabolic panel    Collection Time: 03/05/24  4:43 AM   Result Value Ref Range    Sodium 136 135 - 147 mmol/L    Potassium 3.9 3.5 - 5.3 mmol/L    Chloride 104 96 - 108 mmol/L    CO2 25 21 - 32 mmol/L    ANION GAP 7 mmol/L    BUN 25 5 - 25 mg/dL    Creatinine 1.11 0.60 - 1.30 mg/dL    Glucose 164 (H) 65 - 140 mg/dL    Calcium 7.7 (L) 8.4 - 10.2 mg/dL    eGFR 50 ml/min/1.73sq m   Fingerstick Glucose (POCT)    Collection Time: 03/05/24 12:07 PM   Result Value Ref Range    POC Glucose 160 (H) 65 - 140 mg/dl   Fingerstick Glucose (POCT)    Collection Time: 03/05/24 11:16 PM   Result Value Ref Range    POC Glucose 124 65 - 140 mg/dl   Basic metabolic panel    Collection Time: 03/06/24  4:52 AM   Result Value Ref Range    Sodium 132 (L) 135 - " 147 mmol/L    Potassium 3.9 3.5 - 5.3 mmol/L    Chloride 105 96 - 108 mmol/L    CO2 11 (L) 21 - 32 mmol/L    ANION GAP 16 mmol/L    BUN 10 5 - 25 mg/dL    Creatinine <0.20 (L) 0.60 - 1.30 mg/dL    Glucose 53 (L) 65 - 140 mg/dL    Calcium 6.2 (L) 8.4 - 10.2 mg/dL    eGFR     Calcium, ionized    Collection Time: 03/06/24  4:52 AM   Result Value Ref Range    Calcium, Ionized 0.95 (L) 1.12 - 1.32 mmol/L   Fingerstick Glucose (POCT)    Collection Time: 03/06/24  4:53 AM   Result Value Ref Range    POC Glucose 122 65 - 140 mg/dl    BORIS Buchanan  03/06/24

## 2024-03-06 NOTE — ASSESSMENT & PLAN NOTE
Likely dilutional as LR running at 150cc/hr  Obtain urine studies to rule out other causes as it could be pain related siadh

## 2024-03-06 NOTE — PLAN OF CARE
Problem: PAIN - ADULT  Goal: Verbalizes/displays adequate comfort level or baseline comfort level  Description: Interventions:  - Encourage patient to monitor pain and request assistance  - Assess pain using appropriate pain scale  - Administer analgesics based on type and severity of pain and evaluate response  - Implement non-pharmacological measures as appropriate and evaluate response  - Consider cultural and social influences on pain and pain management  - Notify physician/advanced practitioner if interventions unsuccessful or patient reports new pain  Outcome: Progressing     Problem: INFECTION - ADULT  Goal: Absence or prevention of progression during hospitalization  Description: INTERVENTIONS:  - Assess and monitor for signs and symptoms of infection  - Monitor lab/diagnostic results  - Monitor all insertion sites, i.e. indwelling lines, tubes, and drains  - Monitor endotracheal if appropriate and nasal secretions for changes in amount and color  - [place] appropriate cooling/warming therapies per order  - Administer medications as ordered  - Instruct and encourage patient and family to use good hand hygiene technique  - Identify and instruct in appropriate isolation precautions for identified infection/condition  Outcome: Progressing  Goal: Absence of fever/infection during neutropenic period  Description: INTERVENTIONS:  - Monitor WBC    Outcome: Progressing     Problem: SAFETY ADULT  Goal: Patient will remain free of falls  Description: INTERVENTIONS:  - Educate patient/family on patient safety including physical limitations  - Instruct patient to call for assistance with activity   - Consult OT/PT to assist with strengthening/mobility   - Keep Call bell within reach  - Keep bed low and locked with side rails adjusted as appropriate  - Keep care items and personal belongings within reach  - Initiate and maintain comfort rounds  - Make Fall Risk Sign visible to staff  - Apply yellow socks and bracelet  for high fall risk patients  - Consider moving patient to room near nurses station  Outcome: Progressing  Goal: Maintain or return to baseline ADL function  Description: INTERVENTIONS:  -  Assess patient's ability to carry out ADLs; assess patient's baseline for ADL function and identify physical deficits which impact ability to perform ADLs (bathing, care of mouth/teeth, toileting, grooming, dressing, etc.)  - Assess/evaluate cause of self-care deficits   - Assess range of motion  - Assess patient's mobility; develop plan if impaired  - Assess patient's need for assistive devices and provide as appropriate  - Encourage maximum independence but intervene and supervise when necessary  - Involve family in performance of ADLs  - Assess for home care needs following discharge   - Consider OT consult to assist with ADL evaluation and planning for discharge  - Provide patient education as appropriate  Outcome: Progressing  Goal: Maintains/Returns to pre admission functional level  Description: INTERVENTIONS:  - Perform AM-PAC 6 Click Basic Mobility/ Daily Activity assessment daily.  - Set and communicate daily mobility goal to care team and patient/family/caregiver.   - Collaborate with rehabilitation services on mobility goals if consulted  - Perform Range of Motion 4 times a day.  - Reposition patient every 2 hours.  - Dangle patient 3 times a day  - Stand patient 3 times a day  - Ambulate patient 3 times a day  - Out of bed to chair 3 times a day   - Out of bed for meals 3 times a day  - Out of bed for toileting  - Record patient progress and toleration of activity level   Outcome: Progressing     Problem: DISCHARGE PLANNING  Goal: Discharge to home or other facility with appropriate resources  Description: INTERVENTIONS:  - Identify barriers to discharge w/patient and caregiver  - Arrange for needed discharge resources and transportation as appropriate  - Identify discharge learning needs (meds, wound care, etc.)  -  Arrange for interpretive services to assist at discharge as needed  - Refer to Case Management Department for coordinating discharge planning if the patient needs post-hospital services based on physician/advanced practitioner order or complex needs related to functional status, cognitive ability, or social support system  Outcome: Progressing     Problem: Knowledge Deficit  Goal: Patient/family/caregiver demonstrates understanding of disease process, treatment plan, medications, and discharge instructions  Description: Complete learning assessment and assess knowledge base.  Interventions:  - Provide teaching at level of understanding  - Provide teaching via preferred learning methods  Outcome: Progressing

## 2024-03-06 NOTE — ASSESSMENT & PLAN NOTE
Concern for atelectasis versus pneumonia versus fluid overload versus pain from postop status  X-ray obtained-possible mild pulmonary vascular congestion.  No significant volume overload visible.  Await final report  Will hold off on any IV diuresis at this point.  No pneumonia noted  Shortness of breath secondary to deconditioning and pain from postop status with ambulation

## 2024-03-06 NOTE — PHYSICAL THERAPY NOTE
"Physical Therapy Treatment Note       03/06/24 1050   PT Last Visit   PT Visit Date 03/06/24   Note Type   Note Type Treatment   Pain Assessment   Pain Assessment Tool 0-10   Pain Score 2   Pain Location/Orientation Location: Abdomen   Restrictions/Precautions   Weight Bearing Precautions Per Order No   Braces or Orthoses   (abdominal binder- intact upon arrival)   Other Precautions Multiple lines;Fall Risk;Bed Alarm;Chair Alarm;Pain  (NGT)   General   Chart Reviewed Yes   Response to Previous Treatment Patient with no complaints from previous session.   Family/Caregiver Present Yes   Cognition   Overall Cognitive Status WFL   Arousal/Participation Alert;Responsive;Cooperative   Attention Within functional limits   Orientation Level Oriented X4   Memory Within functional limits   Following Commands Follows all commands and directions without difficulty   Comments pt agreeable to PT session   Subjective   Subjective \"I want to do this again later\"   Bed Mobility   Supine to Sit 3  Moderate assistance   Additional items Assist x 2;HOB elevated;Bedrails;Increased time required;Verbal cues;LE management   Additional Comments abdominal precautions   Transfers   Sit to Stand 4  Minimal assistance   Additional items Armrests;Increased time required;Verbal cues;Assist x 1   Stand to Sit 4  Minimal assistance   Additional items Armrests;Increased time required;Verbal cues;Assist x 1   Additional Comments vc for appropriate hand/foot placement   Ambulation/Elevation   Gait pattern Decreased foot clearance;Antalgic;Short stride;Step to   Gait Assistance 4  Minimal assist   Additional items Assist x 1;Verbal cues;Tactile cues   Assistive Device Bariatric Rolling walker   Distance 150'   Ambulation/Elevation Additional Comments several standing therapeutic rest periods d/t fatigue, mild SOB/HARDIN, SpO2 remained > 90% on RA t/o mobility.  HR up to 150bpm at highest, improved to low 120s following seated rest   Balance   Static " Sitting Fair +   Dynamic Sitting Fair   Static Standing Fair   Dynamic Standing Fair -   Ambulatory Fair -   Endurance Deficit   Endurance Deficit Yes   Activity Tolerance   Activity Tolerance Patient limited by fatigue;Patient limited by pain   Nurse Made Aware RN Thomas Smith   Heelslides Supine;10 reps;AROM;Bilateral   Ankle Pumps Supine;10 reps;AROM;Bilateral   Assessment   Prognosis Good   Problem List Decreased strength;Decreased endurance;Impaired balance;Decreased mobility;Obesity;Pain   Assessment Pt seen for PT treatment session this date, consisting of ther act focused on bed mobility, transfers, standing balance/posture facilitation and gt training on level surfaces to improve pt safety in household environment. Since previous session, pt has made good progress in terms of increased level surface gait trial with use of RW, improved standing/ambulatory balance scores 1/2 grade, continued LB exercises to tolerance. Current goals and POC established on IE remain appropriate, pt continues to have rehab potential and is making good progress towards STGs. Pt prognosis for achieving goals is good, pending pt progress with hospitalization/medical status improvements, and indicated by Stimulability and ability to follow directions. Pt continues to be functioning below baseline level, and remains limited 2* factors listed above. PT will continue to see pt during current hospitalization in order to address the deficits listed above and provide interventions consistent w/ POC in effort to achieve STGs. PT recommends level 2, moderate resource intensity upon discharge.   Barriers to Discharge Inaccessible home environment;Decreased caregiver support   Goals   Patient Goals to decrease the pain, walk   STG Expiration Date 03/15/24   PT Treatment Day 2   Plan   Treatment/Interventions Functional transfer training;LE strengthening/ROM;Therapeutic exercise;Endurance training;Patient/family training;Equipment  eval/education;Bed mobility;Gait training;Elevations;Spoke to nursing;OT   Progress Slow progress, decreased activity tolerance   PT Frequency 3-5x/wk   Discharge Recommendation   Rehab Resource Intensity Level, PT II (Moderate Resource Intensity)   Equipment Recommended Walker  (rowena VELASQUEZ)   AM-PAC Basic Mobility Inpatient   Turning in Flat Bed Without Bedrails 2   Lying on Back to Sitting on Edge of Flat Bed Without Bedrails 2   Moving Bed to Chair 3   Standing Up From Chair Using Arms 3   Walk in Room 3   Climb 3-5 Stairs With Railing 2   Basic Mobility Inpatient Raw Score 15   Basic Mobility Standardized Score 36.97   Highest Level Of Mobility   JH-HLM Goal 4: Move to chair/commode   JH-HLM Achieved 7: Walk 25 feet or more   Education   Education Provided Mobility training;Assistive device   Patient Demonstrates acceptance/verbal understanding   End of Consult   Patient Position at End of Consult Bedside chair;Bed/Chair alarm activated;All needs within reach       Patricia Chaudhari, PT, DPT

## 2024-03-06 NOTE — PROGRESS NOTES
"Progress Note - General Surgery   Ivonne Marcos 68 y.o. female MRN: [id number]  Unit/Bed#: -01 Encounter: [id number]    Assessment:  Ivonne Marcos is a 68 y.o. female POD2 s/p laparoscopic converted to open repair with mesh of incarcerated ventral hernia for SBO.    Tachycardic to 116 this a.m., remainder vital signs are stable  WBC 11.1 yesterday, a.m. pending.    Urine output 1 L  KWESI 65 serosanguineous  NG tube no output    Plan:  Clamp trial NG tube  Continue IV fluids and n.p.o.  Discontinue Murreita catheter today  Out of bed and ambulate  Abdominal binder to remain in place  Monitor abdominal exam, labs, vitals  PRN pain medication and anti-emetics  Encourage ambulation  DVT ppx: heparin  Incentive spirometry 10 times/hour while awake  Continue home medications as prescribed   Slim following for medical management  General surgery is primary.  [place] text with any questions or concerns    Subjective/Objective    Subjective: No acute events overnight.     Objective:     Blood pressure 136/85, pulse (!) 116, temperature 97.8 °F (36.6 °C), resp. rate 22, height 5' 4\" (1.626 m), weight (!) 160 kg (352 lb 11.8 oz), SpO2 94%.,Body mass index is 60.55 kg/m².      Intake/Output Summary (Last 24 hours) at 3/6/2024 0939  Last data filed at 3/5/2024 2122  Gross per 24 hour   Intake 1482.5 ml   Output 1015 ml   Net 467.5 ml       Invasive Devices       Peripheral Intravenous Line  Duration             Peripheral IV 03/03/24 Right Antecubital 2 days    Peripheral IV 03/04/24 Dorsal (posterior);Left Hand 1 day    Peripheral IV 03/04/24 Right;Ventral (anterior) Hand 1 day              Drain  Duration             Closed/Suction Drain Medial Abdomen Bulb 10 Fr. 1 day    Closed/Suction Drain Right Abdomen 10 Fr. 1 day    NG/OG/Enteral Tube Nasogastric 18 Fr Right nare 1 day    Urethral Catheter Asept;Non-latex;Straight-tip 16 Fr. 1 day                    Physical Exam:   GEN: NAD  HEENT: NCAT, MMM.  NG tube in place " with no output  CV: RRR, no m/r/g  Lung: Normal effort, CTA B/L, no w/r/r  Ab: Soft, nondistended, minimally tender to palpation around surgical incisions.  KWESI drain in place with serosanguineous output.  Midline incision with Mepilex dressing in place.  No surrounding erythema, edema, exudate.  Extrem: No CCE   Neuro: A+Ox3     Lab, Imaging and other studies:I have personally reviewed pertinent lab results.     VTE Pharmacologic Prophylaxis: Heparin  VTE Mechanical Prophylaxis: sequential compression device    Recent Results (from the past 36 hour(s))   CBC    Collection Time: 03/05/24  4:43 AM   Result Value Ref Range    WBC 11.16 (H) 4.31 - 10.16 Thousand/uL    RBC 4.86 3.81 - 5.12 Million/uL    Hemoglobin 15.1 11.5 - 15.4 g/dL    Hematocrit 44.5 34.8 - 46.1 %    MCV 92 82 - 98 fL    MCH 31.1 26.8 - 34.3 pg    MCHC 33.9 31.4 - 37.4 g/dL    RDW 12.4 11.6 - 15.1 %    Platelets 239 149 - 390 Thousands/uL    MPV 10.5 8.9 - 12.7 fL   Basic metabolic panel    Collection Time: 03/05/24  4:43 AM   Result Value Ref Range    Sodium 136 135 - 147 mmol/L    Potassium 3.9 3.5 - 5.3 mmol/L    Chloride 104 96 - 108 mmol/L    CO2 25 21 - 32 mmol/L    ANION GAP 7 mmol/L    BUN 25 5 - 25 mg/dL    Creatinine 1.11 0.60 - 1.30 mg/dL    Glucose 164 (H) 65 - 140 mg/dL    Calcium 7.7 (L) 8.4 - 10.2 mg/dL    eGFR 50 ml/min/1.73sq m   Fingerstick Glucose (POCT)    Collection Time: 03/05/24 12:07 PM   Result Value Ref Range    POC Glucose 160 (H) 65 - 140 mg/dl   Fingerstick Glucose (POCT)    Collection Time: 03/05/24 11:16 PM   Result Value Ref Range    POC Glucose 124 65 - 140 mg/dl   Basic metabolic panel    Collection Time: 03/06/24  4:52 AM   Result Value Ref Range    Sodium 132 (L) 135 - 147 mmol/L    Potassium 3.9 3.5 - 5.3 mmol/L    Chloride 105 96 - 108 mmol/L    CO2 11 (L) 21 - 32 mmol/L    ANION GAP 16 mmol/L    BUN 10 5 - 25 mg/dL    Creatinine <0.20 (L) 0.60 - 1.30 mg/dL    Glucose 53 (L) 65 - 140 mg/dL    Calcium 6.2 (L) 8.4  - 10.2 mg/dL    eGFR     Calcium, ionized    Collection Time: 03/06/24  4:52 AM   Result Value Ref Range    Calcium, Ionized 0.95 (L) 1.12 - 1.32 mmol/L   Fingerstick Glucose (POCT)    Collection Time: 03/06/24  4:53 AM   Result Value Ref Range    POC Glucose 122 65 - 140 mg/dl

## 2024-03-06 NOTE — ASSESSMENT & PLAN NOTE
Ionized calcium 0.95  Further drop in serum calcium from yesterday 6.2 (7.7), could be from dilution versus true hypocalcemia.  Check vitamin D25 hydroxy levels, intact PTH levels  Ordered 2 g IV calcium gluconate  Monitor on telemetry  Check magnesium levels  Recommend reducing IV fluid rate

## 2024-03-06 NOTE — ASSESSMENT & PLAN NOTE
S/p Laproscopic Open incisional hernia repair with mesh. Extenisve lysis of adhesions. Partial omentectomy  POD #2  Care per primary team

## 2024-03-06 NOTE — PLAN OF CARE
Problem: PHYSICAL THERAPY ADULT  Goal: Performs mobility at highest level of function for planned discharge setting.  See evaluation for individualized goals.  Description: Treatment/Interventions: Functional transfer training, LE strengthening/ROM, Therapeutic exercise, Endurance training, Patient/family training, Equipment eval/education, Bed mobility, Gait training, Elevations, Spoke to nursing, OT  Equipment Recommended: Walker (rowena)       See flowsheet documentation for full assessment, interventions and recommendations.  Outcome: Progressing  Note: Prognosis: Good  Problem List: Decreased strength, Decreased endurance, Impaired balance, Decreased mobility, Obesity, Pain  Assessment: Pt seen for PT treatment session this date, consisting of ther act focused on bed mobility, transfers, standing balance/posture facilitation and gt training on level surfaces to improve pt safety in household environment. Since previous session, pt has made good progress in terms of increased level surface gait trial with use of RW, improved standing/ambulatory balance scores 1/2 grade, continued LB exercises to tolerance. Current goals and POC established on IE remain appropriate, pt continues to have rehab potential and is making good progress towards STGs. Pt prognosis for achieving goals is good, pending pt progress with hospitalization/medical status improvements, and indicated by Stimulability and ability to follow directions. Pt continues to be functioning below baseline level, and remains limited 2* factors listed above. PT will continue to see pt during current hospitalization in order to address the deficits listed above and provide interventions consistent w/ POC in effort to achieve STGs. PT recommends level 2, moderate resource intensity upon discharge.  Barriers to Discharge: Inaccessible home environment, Decreased caregiver support     Rehab Resource Intensity Level, PT: II (Moderate Resource Intensity)    See  flowsheet documentation for full assessment.

## 2024-03-06 NOTE — ASSESSMENT & PLAN NOTE
"/85   Pulse (!) 116   Temp 97.8 °F (36.6 °C)   Resp 22   Ht 5' 4\" (1.626 m)   Wt (!) 160 kg (352 lb 11.8 oz)   SpO2 94%   BMI 60.55 kg/m²   Stable pressures  In the setting of SBO/incarcerated hernia, blood pressure likely impacted by acute surgical issue   Continue recommendations for IV labetalol 10mg PRN for SBP >180  Continue to follow BP closely and will decide if pt needs PO med at DC  "

## 2024-03-07 PROBLEM — E55.9 VITAMIN D DEFICIENCY: Status: ACTIVE | Noted: 2024-03-07

## 2024-03-07 LAB
ANION GAP SERPL CALCULATED.3IONS-SCNC: 8 MMOL/L
BASOPHILS # BLD AUTO: 0.03 THOUSANDS/ÂΜL (ref 0–0.1)
BASOPHILS NFR BLD AUTO: 0 % (ref 0–1)
BUN SERPL-MCNC: 21 MG/DL (ref 5–25)
CALCIUM SERPL-MCNC: 8.3 MG/DL (ref 8.4–10.2)
CHLORIDE SERPL-SCNC: 105 MMOL/L (ref 96–108)
CO2 SERPL-SCNC: 26 MMOL/L (ref 21–32)
CREAT SERPL-MCNC: 0.66 MG/DL (ref 0.6–1.3)
EOSINOPHIL # BLD AUTO: 0.01 THOUSAND/ÂΜL (ref 0–0.61)
EOSINOPHIL NFR BLD AUTO: 0 % (ref 0–6)
ERYTHROCYTE [DISTWIDTH] IN BLOOD BY AUTOMATED COUNT: 12.6 % (ref 11.6–15.1)
GFR SERPL CREATININE-BSD FRML MDRD: 90 ML/MIN/1.73SQ M
GLUCOSE SERPL-MCNC: 107 MG/DL (ref 65–140)
GLUCOSE SERPL-MCNC: 108 MG/DL (ref 65–140)
GLUCOSE SERPL-MCNC: 135 MG/DL (ref 65–140)
HCT VFR BLD AUTO: 38.1 % (ref 34.8–46.1)
HGB BLD-MCNC: 12.3 G/DL (ref 11.5–15.4)
IMM GRANULOCYTES # BLD AUTO: 0.09 THOUSAND/UL (ref 0–0.2)
IMM GRANULOCYTES NFR BLD AUTO: 1 % (ref 0–2)
LYMPHOCYTES # BLD AUTO: 1.28 THOUSANDS/ÂΜL (ref 0.6–4.47)
LYMPHOCYTES NFR BLD AUTO: 11 % (ref 14–44)
MCH RBC QN AUTO: 30.4 PG (ref 26.8–34.3)
MCHC RBC AUTO-ENTMCNC: 32.3 G/DL (ref 31.4–37.4)
MCV RBC AUTO: 94 FL (ref 82–98)
MONOCYTES # BLD AUTO: 0.74 THOUSAND/ÂΜL (ref 0.17–1.22)
MONOCYTES NFR BLD AUTO: 6 % (ref 4–12)
NEUTROPHILS # BLD AUTO: 9.62 THOUSANDS/ÂΜL (ref 1.85–7.62)
NEUTS SEG NFR BLD AUTO: 82 % (ref 43–75)
NRBC BLD AUTO-RTO: 0 /100 WBCS
PLATELET # BLD AUTO: 247 THOUSANDS/UL (ref 149–390)
PMV BLD AUTO: 10.3 FL (ref 8.9–12.7)
POTASSIUM SERPL-SCNC: 3.9 MMOL/L (ref 3.5–5.3)
RBC # BLD AUTO: 4.05 MILLION/UL (ref 3.81–5.12)
SODIUM SERPL-SCNC: 139 MMOL/L (ref 135–147)
WBC # BLD AUTO: 11.77 THOUSAND/UL (ref 4.31–10.16)

## 2024-03-07 PROCEDURE — 99233 SBSQ HOSP IP/OBS HIGH 50: CPT | Performed by: FAMILY MEDICINE

## 2024-03-07 PROCEDURE — 82948 REAGENT STRIP/BLOOD GLUCOSE: CPT

## 2024-03-07 PROCEDURE — 80048 BASIC METABOLIC PNL TOTAL CA: CPT

## 2024-03-07 PROCEDURE — 85025 COMPLETE CBC W/AUTO DIFF WBC: CPT

## 2024-03-07 PROCEDURE — 99024 POSTOP FOLLOW-UP VISIT: CPT

## 2024-03-07 RX ORDER — OXYCODONE HYDROCHLORIDE 10 MG/1
10 TABLET ORAL EVERY 4 HOURS PRN
Status: DISCONTINUED | OUTPATIENT
Start: 2024-03-07 | End: 2024-03-09 | Stop reason: HOSPADM

## 2024-03-07 RX ORDER — ERGOCALCIFEROL 1.25 MG/1
50000 CAPSULE ORAL WEEKLY
Status: DISCONTINUED | OUTPATIENT
Start: 2024-03-07 | End: 2024-03-09 | Stop reason: HOSPADM

## 2024-03-07 RX ORDER — OXYCODONE HYDROCHLORIDE 5 MG/1
5 TABLET ORAL EVERY 4 HOURS PRN
Status: DISCONTINUED | OUTPATIENT
Start: 2024-03-07 | End: 2024-03-09 | Stop reason: HOSPADM

## 2024-03-07 RX ADMIN — ERGOCALCIFEROL 50000 UNITS: 1.25 CAPSULE, LIQUID FILLED ORAL at 17:43

## 2024-03-07 RX ADMIN — HEPARIN SODIUM 7500 UNITS: 5000 INJECTION INTRAVENOUS; SUBCUTANEOUS at 14:03

## 2024-03-07 RX ADMIN — HEPARIN SODIUM 7500 UNITS: 5000 INJECTION INTRAVENOUS; SUBCUTANEOUS at 05:52

## 2024-03-07 RX ADMIN — FAMOTIDINE 20 MG: 10 INJECTION, SOLUTION INTRAVENOUS at 08:41

## 2024-03-07 RX ADMIN — DOCUSATE SODIUM 100 MG: 100 CAPSULE, LIQUID FILLED ORAL at 17:21

## 2024-03-07 RX ADMIN — Medication 12.5 MG: at 12:00

## 2024-03-07 RX ADMIN — FAMOTIDINE 20 MG: 10 INJECTION, SOLUTION INTRAVENOUS at 21:45

## 2024-03-07 RX ADMIN — HEPARIN SODIUM 7500 UNITS: 5000 INJECTION INTRAVENOUS; SUBCUTANEOUS at 21:45

## 2024-03-07 RX ADMIN — DOCUSATE SODIUM 100 MG: 100 CAPSULE, LIQUID FILLED ORAL at 08:41

## 2024-03-07 RX ADMIN — Medication 12.5 MG: at 21:45

## 2024-03-07 NOTE — PROGRESS NOTES
"Progress Note - General Surgery   Ivonne Marcos 68 y.o. female MRN: [id number]  Unit/Bed#: -01 Encounter: [id number]    Assessment:  Ivonne Marcos is a 68 y.o. female POD3 s/p laparoscopic converted to open repair with mesh of incarcerated ventral hernia for SBO.     AVSS this a.m., tachycardic to 115 last night  WBC 11.7 from 11.7, remainder of labs within normal limits  Right-sided KWESI drain 45  Medial KWESI drain 95    Plan:  NG tube removed as patient is having bowel function and denies any nausea or vomiting.  Start clear liquid diet as tolerated  Discontinue IV fluids  Daily weights  Monitor abdominal exam, labs, vitals  Monitor and record KWESI drain output  PRN pain medication and anti-emetics  Encourage ambulation  DVT ppx: heparin  Incentive spirometry 10 times/hour while awake  Continue home medications as prescribed   General surgery is primary.  Joes text with any questions or concerns.    Subjective/Objective    Subjective: No acute events overnight.     Objective:     Blood pressure 164/95, pulse 105, temperature (!) 97.1 °F (36.2 °C), temperature source Temporal, resp. rate (!) 23, height 5' 4\" (1.626 m), weight (!) 160 kg (352 lb 11.8 oz), SpO2 95%.,Body mass index is 60.55 kg/m².      Intake/Output Summary (Last 24 hours) at 3/7/2024 0822  Last data filed at 3/7/2024 0153  Gross per 24 hour   Intake 405 ml   Output 740 ml   Net -335 ml       Invasive Devices       Peripheral Intravenous Line  Duration             Peripheral IV 03/04/24 Dorsal (posterior);Left Hand 2 days    Long-Dwell Peripheral IV (Midline) 03/06/24 Right Cephalic Vein <1 day              Drain  Duration             Closed/Suction Drain Medial Abdomen Bulb 10 Fr. 2 days    Closed/Suction Drain Right Abdomen 10 Fr. 2 days    NG/OG/Enteral Tube Nasogastric 18 Fr Right nare 2 days                    Physical Exam:   GEN: NAD  HEENT: NCAT, MMM  CV: RRR, no m/r/g  Lung: Normal effort, CTA B/L, no w/r/r  Ab: Soft, NT/ND.  KWESI " drains in place and functional serosanguineous output.  Incision is clean dry and intact with Mepilex dressing in place.  No erythema, edema, drainage.  Extrem: No CCE   Neuro: A+Ox3     Lab, Imaging and other studies:I have personally reviewed pertinent lab results.     VTE Pharmacologic Prophylaxis: Heparin  VTE Mechanical Prophylaxis: sequential compression device    Recent Results (from the past 36 hour(s))   Fingerstick Glucose (POCT)    Collection Time: 03/05/24 11:16 PM   Result Value Ref Range    POC Glucose 124 65 - 140 mg/dl   Basic metabolic panel    Collection Time: 03/06/24  4:52 AM   Result Value Ref Range    Sodium 132 (L) 135 - 147 mmol/L    Potassium 3.9 3.5 - 5.3 mmol/L    Chloride 105 96 - 108 mmol/L    CO2 11 (L) 21 - 32 mmol/L    ANION GAP 16 mmol/L    BUN 10 5 - 25 mg/dL    Creatinine <0.20 (L) 0.60 - 1.30 mg/dL    Glucose 53 (L) 65 - 140 mg/dL    Calcium 6.2 (L) 8.4 - 10.2 mg/dL    eGFR     Calcium, ionized    Collection Time: 03/06/24  4:52 AM   Result Value Ref Range    Calcium, Ionized 0.95 (L) 1.12 - 1.32 mmol/L   Magnesium    Collection Time: 03/06/24  4:52 AM   Result Value Ref Range    Magnesium 0.8 (LL) 1.9 - 2.7 mg/dL   Fingerstick Glucose (POCT)    Collection Time: 03/06/24  4:53 AM   Result Value Ref Range    POC Glucose 122 65 - 140 mg/dl   Sodium, urine, random    Collection Time: 03/06/24  9:33 AM   Result Value Ref Range    Sodium, Ur 15 Reference range not established.   Osmolality, urine    Collection Time: 03/06/24  9:33 AM   Result Value Ref Range    Osmolality, Ur 1,092 (H) 250 - 900 mmol/KG   CBC and differential    Collection Time: 03/06/24  9:48 AM   Result Value Ref Range    WBC 11.72 (H) 4.31 - 10.16 Thousand/uL    RBC 4.19 3.81 - 5.12 Million/uL    Hemoglobin 13.0 11.5 - 15.4 g/dL    Hematocrit 39.2 34.8 - 46.1 %    MCV 94 82 - 98 fL    MCH 31.0 26.8 - 34.3 pg    MCHC 33.2 31.4 - 37.4 g/dL    RDW 12.5 11.6 - 15.1 %    MPV 10.3 8.9 - 12.7 fL    Platelets 225 149 - 390  Thousands/uL    nRBC 0 /100 WBCs    Neutrophils Relative 80 (H) 43 - 75 %    Immat GRANS % 1 0 - 2 %    Lymphocytes Relative 12 (L) 14 - 44 %    Monocytes Relative 7 4 - 12 %    Eosinophils Relative 0 0 - 6 %    Basophils Relative 0 0 - 1 %    Neutrophils Absolute 9.33 (H) 1.85 - 7.62 Thousands/µL    Immature Grans Absolute 0.08 0.00 - 0.20 Thousand/uL    Lymphocytes Absolute 1.43 0.60 - 4.47 Thousands/µL    Monocytes Absolute 0.87 0.17 - 1.22 Thousand/µL    Eosinophils Absolute 0.00 0.00 - 0.61 Thousand/µL    Basophils Absolute 0.01 0.00 - 0.10 Thousands/µL   PTH, intact    Collection Time: 03/06/24 10:14 AM   Result Value Ref Range    PTH 68.1 12.0 - 88.0 pg/mL   Vitamin D 25 hydroxy    Collection Time: 03/06/24 10:14 AM   Result Value Ref Range    Vit D, 25-Hydroxy <7.0 (L) 30.0 - 100.0 ng/mL   Fingerstick Glucose (POCT)    Collection Time: 03/06/24  2:49 PM   Result Value Ref Range    POC Glucose 124 65 - 140 mg/dl   Magnesium    Collection Time: 03/06/24  4:45 PM   Result Value Ref Range    Magnesium 2.6 1.9 - 2.7 mg/dL   Basic metabolic panel    Collection Time: 03/06/24  4:45 PM   Result Value Ref Range    Sodium 137 135 - 147 mmol/L    Potassium 3.9 3.5 - 5.3 mmol/L    Chloride 102 96 - 108 mmol/L    CO2 25 21 - 32 mmol/L    ANION GAP 10 mmol/L    BUN 21 5 - 25 mg/dL    Creatinine 0.77 0.60 - 1.30 mg/dL    Glucose 116 65 - 140 mg/dL    Calcium 8.7 8.4 - 10.2 mg/dL    eGFR 79 ml/min/1.73sq m   Fingerstick Glucose (POCT)    Collection Time: 03/06/24  6:04 PM   Result Value Ref Range    POC Glucose 114 65 - 140 mg/dl   Fingerstick Glucose (POCT)    Collection Time: 03/06/24 11:56 PM   Result Value Ref Range    POC Glucose 119 65 - 140 mg/dl   CBC and differential    Collection Time: 03/07/24  4:45 AM   Result Value Ref Range    WBC 11.77 (H) 4.31 - 10.16 Thousand/uL    RBC 4.05 3.81 - 5.12 Million/uL    Hemoglobin 12.3 11.5 - 15.4 g/dL    Hematocrit 38.1 34.8 - 46.1 %    MCV 94 82 - 98 fL    MCH 30.4 26.8 -  34.3 pg    MCHC 32.3 31.4 - 37.4 g/dL    RDW 12.6 11.6 - 15.1 %    MPV 10.3 8.9 - 12.7 fL    Platelets 247 149 - 390 Thousands/uL    nRBC 0 /100 WBCs    Neutrophils Relative 82 (H) 43 - 75 %    Immat GRANS % 1 0 - 2 %    Lymphocytes Relative 11 (L) 14 - 44 %    Monocytes Relative 6 4 - 12 %    Eosinophils Relative 0 0 - 6 %    Basophils Relative 0 0 - 1 %    Neutrophils Absolute 9.62 (H) 1.85 - 7.62 Thousands/µL    Immature Grans Absolute 0.09 0.00 - 0.20 Thousand/uL    Lymphocytes Absolute 1.28 0.60 - 4.47 Thousands/µL    Monocytes Absolute 0.74 0.17 - 1.22 Thousand/µL    Eosinophils Absolute 0.01 0.00 - 0.61 Thousand/µL    Basophils Absolute 0.03 0.00 - 0.10 Thousands/µL   Basic metabolic panel    Collection Time: 03/07/24  4:45 AM   Result Value Ref Range    Sodium 139 135 - 147 mmol/L    Potassium 3.9 3.5 - 5.3 mmol/L    Chloride 105 96 - 108 mmol/L    CO2 26 21 - 32 mmol/L    ANION GAP 8 mmol/L    BUN 21 5 - 25 mg/dL    Creatinine 0.66 0.60 - 1.30 mg/dL    Glucose 108 65 - 140 mg/dL    Calcium 8.3 (L) 8.4 - 10.2 mg/dL    eGFR 90 ml/min/1.73sq m   Fingerstick Glucose (POCT)    Collection Time: 03/07/24  5:40 AM   Result Value Ref Range    POC Glucose 107 65 - 140 mg/dl

## 2024-03-07 NOTE — ASSESSMENT & PLAN NOTE
Vitamin D level is less than 7  Start ergocalciferol 50,000 mcg once a week x 8 doses  Will need to follow-up outpatient with PCP

## 2024-03-07 NOTE — ASSESSMENT & PLAN NOTE
S/p Laproscopic Open incisional hernia repair with mesh. Extenisve lysis of adhesions. Partial omentectomy  POD #3, currently on clear liquid diet  Care per primary team

## 2024-03-07 NOTE — ASSESSMENT & PLAN NOTE
X-ray shows no fluid overload  Will hold off on any IV diuresis at this point.  No pneumonia noted  Shortness of breath secondary to deconditioning and pain from postop status with ambulation

## 2024-03-07 NOTE — CASE MANAGEMENT
Case Management Discharge Planning Note    Patient name Ivonne Marcos  Location /-01 MRN [id number]  : 1955 Date 3/7/2024       Current Admission Date: 3/3/2024  Current Admission Diagnosis:Incarcerated incisional hernia   Patient Active Problem List    Diagnosis Date Noted    Hyponatremia 2024    Hypomagnesemia 2024    Shortness of breath 2024    Hypocalcemia 2024    Hypertension 2024    Small bowel obstruction (HCC) 2024    Incarcerated incisional hernia 2024    Morbid obesity (HCC) 2024      LOS (days): 3  Geometric Mean LOS (GMLOS) (days): 3.5  Days to GMLOS:0.6     OBJECTIVE:  Risk of Unplanned Readmission Score: 12.79         Current admission status: Inpatient   Preferred Pharmacy:   PATIENT/FAMILY REPORTS NO PREFERRED PHARMACY  No address on file      [id number] - [place], [place] - [address]  [address]  [place] [address]  Phone: [phone] Fax: [phone]    Primary Care Provider: No primary care provider on file.    Primary Insurance: MEDICARE  Secondary Insurance:     DISCHARGE DETAILS:                                          Other Referral/Resources/Interventions Provided:  Interventions: PCP  Referral Comments: called Dr Eddie Barnes office to make a TCM appt. Unable to make appt with Dr Barnes. TCM with KATE Vazquez  @ 0940.                                             IMM Given (Date):: 24  IMM Given to:: Patient     Additional Comments: IMM and Medicare rights reviewed with patient at the bedside. Patient verbalized understanding and signed original. Copy given to patient, signed original filed to medical records.

## 2024-03-07 NOTE — ASSESSMENT & PLAN NOTE
Recent admission for the same issue, resolved without intervention at that time  Continue care per primary team   Diet as per primary team

## 2024-03-07 NOTE — ASSESSMENT & PLAN NOTE
Ionized calcium 0.95  Calcium improved with IV supplementation  Noted to have severe low levels of vitamin D.  Start supplementation  Status post 2 g IV calcium gluconate  Monitor on telemetry  Magnesium level 2.6

## 2024-03-07 NOTE — CASE MANAGEMENT
Case Management Progress Note    Patient name Ivonne Marcos  Location /-01 MRN [id number]  : 1955 Date 3/7/2024       LOS (days): 3  Geometric Mean LOS (GMLOS) (days): 3.5  Days to GMLOS:0.5        OBJECTIVE:        Current admission status: Inpatient  Preferred Pharmacy:   PATIENT/FAMILY REPORTS NO PREFERRED PHARMACY  No address on file      [id number] - [place], [place] - [address]  [address]  [place] [address]  Phone: [phone] Fax: [phone]    Primary Care Provider: No primary care provider on file.    Primary Insurance: MEDICARE  Secondary Insurance:     PROGRESS NOTE:  Informed patient of TCM appt date and time.

## 2024-03-07 NOTE — PLAN OF CARE
Problem: PAIN - ADULT  Goal: Verbalizes/displays adequate comfort level or baseline comfort level  Description: Interventions:  - Encourage patient to monitor pain and request assistance  - Assess pain using appropriate pain scale  - Administer analgesics based on type and severity of pain and evaluate response  - Implement non-pharmacological measures as appropriate and evaluate response  - Consider cultural and social influences on pain and pain management  - Notify physician/advanced practitioner if interventions unsuccessful or patient reports new pain  3/7/2024 0754 by Thomas Bailey RN  Outcome: Progressing  3/6/2024 1939 by Thomas Bailey RN  Outcome: Progressing

## 2024-03-07 NOTE — ASSESSMENT & PLAN NOTE
Resolved now after lowering IV fluid rate likely dilutional as LR running at 150cc/hr  Continue to monitor daily

## 2024-03-07 NOTE — ASSESSMENT & PLAN NOTE
"/95 (BP Location: Left arm)   Pulse 105   Temp (!) 97.1 °F (36.2 °C) (Temporal)   Resp (!) 23   Ht 5' 4\" (1.626 m)   Wt (!) 160 kg (352 lb 11.8 oz)   SpO2 95%   BMI 60.55 kg/m²   Pressures noted to be elevated greater than 160 for the last few readings.  Will start on p.o. Lopressor    "

## 2024-03-07 NOTE — PLAN OF CARE
Problem: PAIN - ADULT  Goal: Verbalizes/displays adequate comfort level or baseline comfort level  Description: Interventions:  - Encourage patient to monitor pain and request assistance  - Assess pain using appropriate pain scale  - Administer analgesics based on type and severity of pain and evaluate response  - Implement non-pharmacological measures as appropriate and evaluate response  - Consider cultural and social influences on pain and pain management  - Notify physician/advanced practitioner if interventions unsuccessful or patient reports new pain  Outcome: Progressing     Problem: INFECTION - ADULT  Goal: Absence or prevention of progression during hospitalization  Description: INTERVENTIONS:  - Assess and monitor for signs and symptoms of infection  - Monitor lab/diagnostic results  - Monitor all insertion sites, i.e. indwelling lines, tubes, and drains  - Monitor endotracheal if appropriate and nasal secretions for changes in amount and color  - Ophelia appropriate cooling/warming therapies per order  - Administer medications as ordered  - Instruct and encourage patient and family to use good hand hygiene technique  - Identify and instruct in appropriate isolation precautions for identified infection/condition  Outcome: Progressing  Goal: Absence of fever/infection during neutropenic period  Description: INTERVENTIONS:  - Monitor WBC    Outcome: Progressing     Problem: SAFETY ADULT  Goal: Patient will remain free of falls  Description: INTERVENTIONS:  - Educate patient/family on patient safety including physical limitations  - Instruct patient to call for assistance with activity   - Consult OT/PT to assist with strengthening/mobility   - Keep Call bell within reach  - Keep bed low and locked with side rails adjusted as appropriate  - Keep care items and personal belongings within reach  - Initiate and maintain comfort rounds  - Make Fall Risk Sign visible to staff  - Apply yellow socks and bracelet  for high fall risk patients  - Consider moving patient to room near nurses station  Outcome: Progressing  Goal: Maintain or return to baseline ADL function  Description: INTERVENTIONS:  -  Assess patient's ability to carry out ADLs; assess patient's baseline for ADL function and identify physical deficits which impact ability to perform ADLs (bathing, care of mouth/teeth, toileting, grooming, dressing, etc.)  - Assess/evaluate cause of self-care deficits   - Assess range of motion  - Assess patient's mobility; develop plan if impaired  - Assess patient's need for assistive devices and provide as appropriate  - Encourage maximum independence but intervene and supervise when necessary  - Involve family in performance of ADLs  - Assess for home care needs following discharge   - Consider OT consult to assist with ADL evaluation and planning for discharge  - Provide patient education as appropriate  Outcome: Progressing  Goal: Maintains/Returns to pre admission functional level  Description: INTERVENTIONS:  - Perform AM-PAC 6 Click Basic Mobility/ Daily Activity assessment daily.  - Set and communicate daily mobility goal to care team and patient/family/caregiver.   - Collaborate with rehabilitation services on mobility goals if consulted  - Perform Range of Motion 4 times a day.  - Reposition patient every 2 hours.  - Dangle patient 3 times a day  - Stand patient 3 times a day  - Ambulate patient 3 times a day  - Out of bed to chair 3 times a day   - Out of bed for meals 3 times a day  - Out of bed for toileting  - Record patient progress and toleration of activity level   Outcome: Progressing     Problem: DISCHARGE PLANNING  Goal: Discharge to home or other facility with appropriate resources  Description: INTERVENTIONS:  - Identify barriers to discharge w/patient and caregiver  - Arrange for needed discharge resources and transportation as appropriate  - Identify discharge learning needs (meds, wound care, etc.)  -  Arrange for interpretive services to assist at discharge as needed  - Refer to Case Management Department for coordinating discharge planning if the patient needs post-hospital services based on physician/advanced practitioner order or complex needs related to functional status, cognitive ability, or social support system  Outcome: Progressing     Problem: Knowledge Deficit  Goal: Patient/family/caregiver demonstrates understanding of disease process, treatment plan, medications, and discharge instructions  Description: Complete learning assessment and assess knowledge base.  Interventions:  - Provide teaching at level of understanding  - Provide teaching via preferred learning methods  Outcome: Progressing

## 2024-03-07 NOTE — PROGRESS NOTES
"[facility]  Progress Note  Name: Ivonne Marcos I  MRN: [id number]  Unit/Bed#: -01 I Date of Admission: 3/3/2024   Date of Service: 3/7/2024 I Hospital Day: 3    Assessment/Plan   * Incarcerated incisional hernia  Assessment & Plan  S/p Laproscopic Open incisional hernia repair with mesh. Extenisve lysis of adhesions. Partial omentectomy  POD #3, currently on clear liquid diet  Care per primary team     Hypertension  Assessment & Plan  /95 (BP Location: Left arm)   Pulse 105   Temp (!) 97.1 °F (36.2 °C) (Temporal)   Resp (!) 23   Ht 5' 4\" (1.626 m)   Wt (!) 160 kg (352 lb 11.8 oz)   SpO2 95%   BMI 60.55 kg/m²   Pressures noted to be elevated greater than 160 for the last few readings.  Will start on p.o. Lopressor      Profound Vitamin D deficiency  Assessment & Plan  Vitamin D level is less than 7  Start ergocalciferol 50,000 mcg once a week x 8 doses  Will need to follow-up outpatient with PCP    Shortness of breath  Assessment & Plan  X-ray shows no fluid overload  Will hold off on any IV diuresis at this point.  No pneumonia noted  Shortness of breath secondary to deconditioning and pain from postop status with ambulation    Hypomagnesemia  Assessment & Plan  Resolved  Status post 2 g IV magnesium sulfate on 3/6/2024    Hyponatremia  Assessment & Plan  Resolved now after lowering IV fluid rate likely dilutional as LR running at 150cc/hr  Continue to monitor daily    Hypocalcemia  Assessment & Plan  Ionized calcium 0.95  Calcium improved with IV supplementation  Noted to have severe low levels of vitamin D.  Start supplementation  Status post 2 g IV calcium gluconate  Monitor on telemetry  Magnesium level 2.6      Morbid obesity (HCC)  Assessment & Plan  BMI>60, contributing to medical complexity  Counseling for weight loss done      Small bowel obstruction (HCC)  Assessment & Plan  Recent admission for the same issue, resolved without intervention at that " time  Continue care per primary team   Diet as per primary team               VTE Pharmacologic Prophylaxis:    Heparin    Mobility:   Basic Mobility Inpatient Raw Score: 17  JH-HLM Goal: 5: Stand one or more mins  JH-HLM Achieved: 7: Walk 25 feet or more  HLM Goal achieved. Continue to encourage appropriate mobility.    Patient Centered Rounds: I performed bedside rounds with nursing staff today.   Discussions with Specialists or Other Care Team Provider: General surgery    Education and Discussions with Family / Patient:   at bedside.     Total Time Spent on Date of Encounter in care of patient: 45 mins. This time was spent on one or more of the following: performing physical exam; counseling and coordination of care; obtaining or reviewing history; documenting in the medical record; reviewing/ordering tests, medications or procedures; communicating with other healthcare professionals and discussing with patient's family/caregivers.    Current Length of Stay: 3 day(s)  Current Patient Status: Inpatient   Certification Statement: The patient will continue to require additional inpatient hospital stay due to postop care  Discharge Plan: [facility] is following this patient on consult. They are medically stable for discharge when deemed appropriate by primary service.    Code Status: Level 1 - Full Code    Subjective:   Patient seen and examined at bedside during a.m. rounds.  Patient resting comfortably in bed.  Denies any shortness of breath or chest pain  Abdominal pain is improving  Diet has been advanced to clear liquid diet    Objective:     Vitals:   Temp (24hrs), Av.9 °F (36.6 °C), Min:97.1 °F (36.2 °C), Max:98.6 °F (37 °C)    Temp:  [97.1 °F (36.2 °C)-98.6 °F (37 °C)] 98 °F (36.7 °C)  HR:  [] 86  Resp:  [20-23] 20  BP: (156-170)/(83-97) 156/83  SpO2:  [91 %-96 %] 96 %  Body mass index is 52.07 kg/m².     Input and Output Summary (last 24 hours):     Intake/Output Summary (Last 24 hours) at  3/7/2024 1616  Last data filed at 3/7/2024 1401  Gross per 24 hour   Intake 500 ml   Output 570 ml   Net -70 ml       Physical Exam:   Physical Exam S1-S2 audible, regular  Lungs are to auscultation bilaterally  No new focal neurodeficit  Abdomen expected postop tenderness, bowel sounds minimally audible  Alert and oriented times and person    Additional Data:     Labs:  Results from last 7 days   Lab Units 03/07/24  0445   WBC Thousand/uL 11.77*   HEMOGLOBIN g/dL 12.3   HEMATOCRIT % 38.1   PLATELETS Thousands/uL 247   NEUTROS PCT % 82*   LYMPHS PCT % 11*   MONOS PCT % 6   EOS PCT % 0     Results from last 7 days   Lab Units 03/07/24  0445 03/05/24  0443 03/03/24  1747   SODIUM mmol/L 139   < > 135   POTASSIUM mmol/L 3.9   < > 3.7   CHLORIDE mmol/L 105   < > 99   CO2 mmol/L 26   < > 26   BUN mg/dL 21   < > 12   CREATININE mg/dL 0.66   < > 0.83   ANION GAP mmol/L 8   < > 10   CALCIUM mg/dL 8.3*   < > 9.2   ALBUMIN g/dL  --   --  4.1   TOTAL BILIRUBIN mg/dL  --   --  0.72   ALK PHOS U/L  --   --  102   ALT U/L  --   --  35   AST U/L  --   --  33   GLUCOSE RANDOM mg/dL 108   < > 154*    < > = values in this interval not displayed.         Results from last 7 days   Lab Units 03/07/24  1522 03/07/24  0540 03/06/24  2356 03/06/24  1804 03/06/24  1449 03/06/24  0453 03/05/24  2316 03/05/24  1207   POC GLUCOSE mg/dl 135 107 119 114 124 122 124 160*               Lines/Drains:  Invasive Devices       Peripheral Intravenous Line  Duration             Peripheral IV 03/04/24 Dorsal (posterior);Left Hand 3 days    Long-Dwell Peripheral IV (Midline) 03/06/24 Right Cephalic Vein 1 day              Drain  Duration             Closed/Suction Drain Medial Abdomen Bulb 10 Fr. 2 days    Closed/Suction Drain Right Abdomen 10 Fr. 2 days                          Imaging: No pertinent imaging reviewed.    Recent Cultures (last 7 days):         Last 24 Hours Medication List:   Current Facility-Administered Medications   Medication Dose  Route Frequency Provider Last Rate    acetaminophen  650 mg Oral Q6H PRN Smith S Marisa, PA-C      docusate sodium  100 mg Oral BID Smith S Marisa, PA-QUENTIN      ergocalciferol  50,000 Units Oral Weekly Freddy Lawler MD      famotidine  20 mg Intravenous Q12H [facility] Smith S Marisa, WINTER      heparin (porcine)  7,500 Units Subcutaneous Q8H [facility] Smith S Marisa, WINTER      HYDROmorphone  1 mg Intravenous Q6H PRN Smtih S Marisa, WINTER      labetalol  10 mg Intravenous Q6H PRN Yusra Ni, PA-C      metoprolol tartrate  12.5 mg Oral Q12H [facility] Freddy Lawler MD      naloxone  0.04 mg Intravenous Q1MIN PRN Smith S Marisa, WINTER      ondansetron  4 mg Intravenous Q6H PRN Smith S Marisa, WINTER      oxyCODONE  10 mg Oral Q4H PRN Mario Alberto Holloway PA-C      oxyCODONE  5 mg Oral Q4H PRN Mario Alberto Holloway PA-C          Today, Patient Was Seen By: Freddy Lawler MD    **Please Note: This note may have been constructed using a voice recognition system.**

## 2024-03-08 LAB
ANION GAP SERPL CALCULATED.3IONS-SCNC: 6 MMOL/L
BASOPHILS # BLD AUTO: 0.03 THOUSANDS/ÂΜL (ref 0–0.1)
BASOPHILS NFR BLD AUTO: 0 % (ref 0–1)
BUN SERPL-MCNC: 19 MG/DL (ref 5–25)
CA-I BLD-SCNC: 1.1 MMOL/L (ref 1.12–1.32)
CALCIUM SERPL-MCNC: 8.4 MG/DL (ref 8.4–10.2)
CHLORIDE SERPL-SCNC: 103 MMOL/L (ref 96–108)
CO2 SERPL-SCNC: 28 MMOL/L (ref 21–32)
CREAT SERPL-MCNC: 0.63 MG/DL (ref 0.6–1.3)
EOSINOPHIL # BLD AUTO: 0.07 THOUSAND/ÂΜL (ref 0–0.61)
EOSINOPHIL NFR BLD AUTO: 1 % (ref 0–6)
ERYTHROCYTE [DISTWIDTH] IN BLOOD BY AUTOMATED COUNT: 12.6 % (ref 11.6–15.1)
GFR SERPL CREATININE-BSD FRML MDRD: 91 ML/MIN/1.73SQ M
GLUCOSE SERPL-MCNC: 119 MG/DL (ref 65–140)
HCT VFR BLD AUTO: 38.6 % (ref 34.8–46.1)
HGB BLD-MCNC: 12.8 G/DL (ref 11.5–15.4)
IMM GRANULOCYTES # BLD AUTO: 0.12 THOUSAND/UL (ref 0–0.2)
IMM GRANULOCYTES NFR BLD AUTO: 1 % (ref 0–2)
LYMPHOCYTES # BLD AUTO: 1.64 THOUSANDS/ÂΜL (ref 0.6–4.47)
LYMPHOCYTES NFR BLD AUTO: 18 % (ref 14–44)
MAGNESIUM SERPL-MCNC: 2.4 MG/DL (ref 1.9–2.7)
MCH RBC QN AUTO: 30.9 PG (ref 26.8–34.3)
MCHC RBC AUTO-ENTMCNC: 33.2 G/DL (ref 31.4–37.4)
MCV RBC AUTO: 93 FL (ref 82–98)
MONOCYTES # BLD AUTO: 0.55 THOUSAND/ÂΜL (ref 0.17–1.22)
MONOCYTES NFR BLD AUTO: 6 % (ref 4–12)
NEUTROPHILS # BLD AUTO: 6.63 THOUSANDS/ÂΜL (ref 1.85–7.62)
NEUTS SEG NFR BLD AUTO: 74 % (ref 43–75)
NRBC BLD AUTO-RTO: 0 /100 WBCS
PLATELET # BLD AUTO: 268 THOUSANDS/UL (ref 149–390)
PMV BLD AUTO: 10.2 FL (ref 8.9–12.7)
POTASSIUM SERPL-SCNC: 3.7 MMOL/L (ref 3.5–5.3)
RBC # BLD AUTO: 4.14 MILLION/UL (ref 3.81–5.12)
SODIUM SERPL-SCNC: 137 MMOL/L (ref 135–147)
WBC # BLD AUTO: 9.04 THOUSAND/UL (ref 4.31–10.16)

## 2024-03-08 PROCEDURE — 83735 ASSAY OF MAGNESIUM: CPT | Performed by: FAMILY MEDICINE

## 2024-03-08 PROCEDURE — 82330 ASSAY OF CALCIUM: CPT | Performed by: FAMILY MEDICINE

## 2024-03-08 PROCEDURE — 99233 SBSQ HOSP IP/OBS HIGH 50: CPT | Performed by: FAMILY MEDICINE

## 2024-03-08 PROCEDURE — 99024 POSTOP FOLLOW-UP VISIT: CPT | Performed by: PHYSICIAN ASSISTANT

## 2024-03-08 PROCEDURE — 85025 COMPLETE CBC W/AUTO DIFF WBC: CPT

## 2024-03-08 PROCEDURE — 80048 BASIC METABOLIC PNL TOTAL CA: CPT

## 2024-03-08 RX ADMIN — DOCUSATE SODIUM 100 MG: 100 CAPSULE, LIQUID FILLED ORAL at 10:40

## 2024-03-08 RX ADMIN — DOCUSATE SODIUM 100 MG: 100 CAPSULE, LIQUID FILLED ORAL at 17:49

## 2024-03-08 RX ADMIN — HEPARIN SODIUM 7500 UNITS: 5000 INJECTION INTRAVENOUS; SUBCUTANEOUS at 14:41

## 2024-03-08 RX ADMIN — HEPARIN SODIUM 7500 UNITS: 5000 INJECTION INTRAVENOUS; SUBCUTANEOUS at 22:26

## 2024-03-08 RX ADMIN — FAMOTIDINE 20 MG: 10 INJECTION, SOLUTION INTRAVENOUS at 22:26

## 2024-03-08 RX ADMIN — HEPARIN SODIUM 7500 UNITS: 5000 INJECTION INTRAVENOUS; SUBCUTANEOUS at 05:27

## 2024-03-08 RX ADMIN — FAMOTIDINE 20 MG: 10 INJECTION, SOLUTION INTRAVENOUS at 10:38

## 2024-03-08 RX ADMIN — METOPROLOL TARTRATE 25 MG: 25 TABLET, FILM COATED ORAL at 22:27

## 2024-03-08 NOTE — ASSESSMENT & PLAN NOTE
Ionized calcium has improved  Calcium improved with IV supplementation  Noted to have severe low levels of vitamin D.  Start supplementation  Status post 2 g IV calcium gluconate  Monitor on telemetry  Stable magnesium

## 2024-03-08 NOTE — PROGRESS NOTES
"Progress Note - General Surgery   Ivonne Marcos 68 y.o. female MRN: [id number]  Unit/Bed#: -01 Encounter: [id number]    Assessment/Plan  POD 4 Laparoscopic Converted to Open Ventral Hernia Repair with mesh, extensive SEAMUS, and partial omentectomy for SBO due to Incarcerated Ventral Hernia       Tachycardia continue max 105 last 24  SOB, CXR no fluid overload, likely due to large ventral hernia now contained within the abdominal cavity, post op pain, and chronic deconditioning, pt sedentary pre op  O2 sats 96% no hypoxia    KWESI 365  WBC 9, from 11.77  CLD yesterday, pt tolerated well, no pain, passing gas, with diarrhea.   2 KWESI drains, medial drain 335, right drain 30,    last 24, 400 today   Pt is motivated and compliant with recommendations.     -advance to surgical soft diet  -cont to ambulate  -cont IS  -cont DVT PPX on therapeutic Lovenox  -cont KWESI drains   -cont abdominal binder            Chief Complaint: I don't have much pain except when I have to get up.  I am walking, using the breathing machine   I am having gas and still with diarrhea.       Objective/Exam: Blood pressure 153/76, pulse 100, temperature 97.9 °F (36.6 °C), resp. rate 20, height 5' 4\" (1.626 m), weight (!) 138 kg (303 lb 9.2 oz), SpO2 96%.    Wound Culure: No results found for: \"WOUNDCULT\"      General Appearance:    Alert and orientated x 3, cooperative, no distress   Lungs:     Clear to auscultation bilaterally, respirations are mildly labored with talking,     Heart:    Regular rhythm, rate 100 on Mosimo   Abdomen:     Soft, appropriate incisional tenderness,   Mepilex dressing is clean and dry,  Staple closure of the incision, healing well.  KWESI drain,   Medial bulb is full with s.s fluid  Right drain is empty           Extremities:   Extremities normal,  no cyanosis or edema   Pulses:   2+ and symmetric all extremities, no calf tenderness   Skin:   Skin color, texture, turgor normal, no rashes or lesions " "  Neurologic:   CNII-XII intact, normal strength, sensation and reflexes     Throughout, affect appropriate       ,      Intake/Output Summary (Last 24 hours) at 3/8/2024 0838  Last data filed at 3/8/2024 0537  Gross per 24 hour   Intake 19782 ml   Output 1015 ml   Net 72491 ml       Invasive Devices       Peripheral Intravenous Line  Duration             Peripheral IV 03/04/24 Dorsal (posterior);Left Hand 3 days    Long-Dwell Peripheral IV (Midline) 03/06/24 Right Cephalic Vein 1 day              Drain  Duration             Closed/Suction Drain Medial Abdomen Bulb 10 Fr. 3 days    Closed/Suction Drain Right Abdomen 10 Fr. 3 days                                            Labs: CBC with diff: @RESUFAST(WBC,HGB,HCT,MCV,PLT,ADJUSTEDWBC,   RBC,MCH,MCHC,RDW,MPV,NRBC,TOTALCELLSCOUNTED,SEGS%,GRANS%,LYMPHS%,EOS%,BASO%,ABNEUT,ABGRANS,ABLYMPHS,ABMOMOS,ABEOS,ABBASO)@,   BMP/CMP:  Lab Results   Component Value Date    K 3.7 03/08/2024     03/08/2024    CO2 28 03/08/2024    BUN 19 03/08/2024    CREATININE 0.63 03/08/2024    CALCIUM 8.4 03/08/2024    AST 33 03/03/2024    ALT 35 03/03/2024    ALKPHOS 102 03/03/2024    EGFR 91 03/08/2024   ,   Lipid Panel: No results found for: \"CHOL\",   Coags: No results found for: \"PT\", \"PTT\", \"INR\",     Blood Culture:   Lab Results   Component Value Date    BLOODCX No Growth After 5 Days. 02/28/2024    BLOODCX No Growth After 5 Days. 02/28/2024   ,   Urinalysis:   Lab Results   Component Value Date    COLORU Light Yellow 02/27/2024    CLARITYU Clear 02/27/2024    SPECGRAV >=1.050 (H) 02/27/2024    PHUR 7.0 02/27/2024    LEUKOCYTESUR Negative 02/27/2024    NITRITE Negative 02/27/2024    GLUCOSEU Negative 02/27/2024    KETONESU Negative 02/27/2024    BILIRUBINUR Negative 02/27/2024    BLOODU Trace (A) 02/27/2024   ,   Urine Culture: No results found for: \"URINECX\",         Imaging: XR chest portable    Result Date: 3/6/2024  Impression: No acute cardiopulmonary disease. NG tube in stomach. " Workstation performed: [id number]     XR abdomen 1 view kub    Result Date: 3/5/2024  Impression: There is no evidence of radiopaque retained surgical instrument in the right abdomen or abdominal midline Workstation performed: [id number]     CT abdomen pelvis with contrast    Result Date: 3/3/2024  Impression: Multiple fluid-filled small bowel loops with transition point within a large lobulated right lower quadrant ventral abdominal wall hernia compatible with small bowel obstruction. There are dilated loops of small bowel which also abruptly change in caliber at the entrance and exit of the hernia, closed-loop obstruction cannot be excluded. Surgical consultation is recommended. I personally discussed this study with CATHY BA on 3/3/2024 8:19 PM. Workstation performed: [id number]         Shirley Kemp PA-C  3/8/2024

## 2024-03-08 NOTE — CASE MANAGEMENT
Case Management Progress Note    Patient name Ivonne Marcos  Location /-01 MRN [id number]  : 1955 Date 3/8/2024       LOS (days): 4  Geometric Mean LOS (GMLOS) (days): 3.5  Days to GMLOS:-0.4        OBJECTIVE:        Current admission status: Inpatient  Preferred Pharmacy:   PATIENT/FAMILY REPORTS NO PREFERRED PHARMACY  No address on file      [id number] - [place], [place] - [address]  [address]  [place] [address]  Phone: [phone] Fax: [phone]    Primary Care Provider: No primary care provider on file.    Primary Insurance: MEDICARE  Secondary Insurance:     PROGRESS NOTE:  Discussed patient's case in interdisciplinary rounds this morning with [facility] provider. Surgery is primary, cleared from medicine end- awaiting surgical clearance. Patient to go home with [facility]. CM will continue to follow for needs.

## 2024-03-08 NOTE — ASSESSMENT & PLAN NOTE
Resolved  X-ray shows no fluid overload  Will hold off on any IV diuresis at this point.  No pneumonia noted  Shortness of breath secondary to deconditioning and pain from postop status with ambulation

## 2024-03-08 NOTE — PROGRESS NOTES
"Critical access hospital  Progress Note  Name: Ivonne Marcos I  MRN: [id number]  Unit/Bed#: -01 I Date of Admission: 3/3/2024   Date of Service: 3/8/2024 I Hospital Day: 4    Assessment/Plan   * Incarcerated incisional hernia  Assessment & Plan  S/p Laproscopic Open incisional hernia repair with mesh. Extenisve lysis of adhesions. Partial omentectomy  POD #4, currently on clear liquid diet  Care per primary team     Hypertension  Assessment & Plan  /76   Pulse 100   Temp 97.9 °F (36.6 °C)   Resp 20   Ht 5' 4\" (1.626 m)   Wt (!) 138 kg (303 lb 9.2 oz)   SpO2 96%   BMI 52.11 kg/m²   Acceptable pressures  Started on Lopressor 25 mg twice daily  Continue as needed labetalol for systolic blood pressure greater than 180      Profound Vitamin D deficiency  Assessment & Plan  Vitamin D level is less than 7  Start ergocalciferol 50,000 mcg once a week x 8 doses  Will need to follow-up outpatient with PCP    Shortness of breath  Assessment & Plan  Resolved  X-ray shows no fluid overload  Will hold off on any IV diuresis at this point.  No pneumonia noted  Shortness of breath secondary to deconditioning and pain from postop status with ambulation    Hypomagnesemia  Assessment & Plan  Resolved  Status post 2 g IV magnesium sulfate on 3/6/2024    Hyponatremia  Assessment & Plan  Resolved  Continue to monitor daily    Hypocalcemia  Assessment & Plan  Ionized calcium has improved  Calcium improved with IV supplementation  Noted to have severe low levels of vitamin D.  Start supplementation  Status post 2 g IV calcium gluconate  Monitor on telemetry  Stable magnesium      Small bowel obstruction (HCC)  Assessment & Plan  Recent admission for the same issue, resolved without intervention at that time  Continue care per primary team   Diet as per primary team               VTE Pharmacologic Prophylaxis:    heparin    Mobility:   Basic Mobility Inpatient Raw Score: 17  -[facility] Goal: 5: Stand one or " more mins  [facility]-HLM Achieved: 8: Walk 250 feet ot more  HLM Goal achieved. Continue to encourage appropriate mobility.    Patient Centered Rounds: I performed bedside rounds with nursing staff today.   Discussions with Specialists or Other Care Team Provider: primary team    Education and Discussions with Family / Patient:  patient updated.     Total Time Spent on Date of Encounter in care of patient: 45 mins. This time was spent on one or more of the following: performing physical exam; counseling and coordination of care; obtaining or reviewing history; documenting in the medical record; reviewing/ordering tests, medications or procedures; communicating with other healthcare professionals and discussing with patient's family/caregivers.    Current Length of Stay: 4 day(s)  Current Patient Status: Inpatient   Certification Statement: The patient will continue to require additional inpatient hospital stay due to defer to primary team  Discharge Plan: [facility] is following this patient on consult. They are medically stable for discharge when deemed appropriate by primary service.    Code Status: Level 1 - Full Code    Subjective:   Patient seen and examined at bedside during a.m. rounds.  Patient resting comfortably in bed.  Denies any new complaints.  Able to tolerate diet    Objective:     Vitals:   Temp (24hrs), Av °F (36.7 °C), Min:97.9 °F (36.6 °C), Max:98 °F (36.7 °C)    Temp:  [97.9 °F (36.6 °C)-98 °F (36.7 °C)] 97.9 °F (36.6 °C)  HR:  [] 100  Resp:  [20] 20  BP: (153-170)/(76-93) 153/76  SpO2:  [96 %] 96 %  Body mass index is 52.11 kg/m².     Input and Output Summary (last 24 hours):     Intake/Output Summary (Last 24 hours) at 3/8/2024 1013  Last data filed at 3/8/2024 0537  Gross per 24 hour   Intake 45786 ml   Output 765 ml   Net 05793 ml       Physical Exam:   Physical Exam General- Awake, alert and oriented x 3, looks comfortable, morbidly obese  HEENT- Normocephalic, atraumatic, oral mucosa-  moist  Neck- Supple, No carotid bruit, no JVD  CVS- Normal S1/ S2, Regular rate and rhythm, No murmur, chronic pitting +1 pedal edema  Respiratory system- B/L clear breath sounds, no wheezing  Abdomen- Soft, Non distended, improved abdominal tenderness, Bowel sound- present 4 quads  Genitourinary- No suprapubic tenderness, No CVA tenderness  Skin- No new bruise or rash  Musculoskeletal- No gross deformity  Psych- No acute psychosis  CNS- CN II- XII grossly intact, No acute focal neurologic deficit noted]      Additional Data:     Labs:  Results from last 7 days   Lab Units 03/08/24  0537   WBC Thousand/uL 9.04   HEMOGLOBIN g/dL 12.8   HEMATOCRIT % 38.6   PLATELETS Thousands/uL 268   NEUTROS PCT % 74   LYMPHS PCT % 18   MONOS PCT % 6   EOS PCT % 1     Results from last 7 days   Lab Units 03/08/24  0537 03/05/24  0443 03/03/24  1747   SODIUM mmol/L 137   < > 135   POTASSIUM mmol/L 3.7   < > 3.7   CHLORIDE mmol/L 103   < > 99   CO2 mmol/L 28   < > 26   BUN mg/dL 19   < > 12   CREATININE mg/dL 0.63   < > 0.83   ANION GAP mmol/L 6   < > 10   CALCIUM mg/dL 8.4   < > 9.2   ALBUMIN g/dL  --   --  4.1   TOTAL BILIRUBIN mg/dL  --   --  0.72   ALK PHOS U/L  --   --  102   ALT U/L  --   --  35   AST U/L  --   --  33   GLUCOSE RANDOM mg/dL 119   < > 154*    < > = values in this interval not displayed.         Results from last 7 days   Lab Units 03/07/24  1522 03/07/24  0540 03/06/24  2356 03/06/24  1804 03/06/24  1449 03/06/24  0453 03/05/24  2316 03/05/24  1207   POC GLUCOSE mg/dl 135 107 119 114 124 122 124 160*               Lines/Drains:  Invasive Devices       Peripheral Intravenous Line  Duration             Peripheral IV 03/04/24 Dorsal (posterior);Left Hand 3 days    Long-Dwell Peripheral IV (Midline) 03/06/24 Right Cephalic Vein 1 day              Drain  Duration             Closed/Suction Drain Medial Abdomen Bulb 10 Fr. 3 days    Closed/Suction Drain Right Abdomen 10 Fr. 3 days                          Imaging: No  pertinent imaging reviewed.    Recent Cultures (last 7 days):         Last 24 Hours Medication List:   Current Facility-Administered Medications   Medication Dose Route Frequency Provider Last Rate    acetaminophen  650 mg Oral Q6H PRN Smith S Marisa, PA-C      docusate sodium  100 mg Oral BID Smith S Marisa, PA-C      ergocalciferol  50,000 Units Oral Weekly Freddy Lawler MD      famotidine  20 mg Intravenous Q12H RANDALL Smith S Marisa, PA-C      heparin (porcine)  7,500 Units Subcutaneous Q8H RANDALL Smith S Marisa, PA-C      HYDROmorphone  1 mg Intravenous Q6H PRN Smith S Marisa, PA-C      labetalol  10 mg Intravenous Q6H PRN Yusrawenceslao Ramachandran PA-C      metoprolol tartrate  25 mg Oral Q12H [facility] Freddy Lawler MD      naloxone  0.04 mg Intravenous Q1MIN PRN Smith S Marisa, PA-QUENTIN      ondansetron  4 mg Intravenous Q6H PRN Smith Rodriguezado, PA-QUENTIN      oxyCODONE  10 mg Oral Q4H PRN Mario Alberto Holloway PA-C      oxyCODONE  5 mg Oral Q4H PRN Mario Alberto Holloway PA-C          Today, Patient Was Seen By: Freddy Lawler MD    **Please Note: This note may have been constructed using a voice recognition system.**

## 2024-03-08 NOTE — PLAN OF CARE
Problem: INFECTION - ADULT  Goal: Absence or prevention of progression during hospitalization  Description: INTERVENTIONS:  - Assess and monitor for signs and symptoms of infection  - Monitor lab/diagnostic results  - Monitor all insertion sites, i.e. indwelling lines, tubes, and drains  - Monitor endotracheal if appropriate and nasal secretions for changes in amount and color  - [place] appropriate cooling/warming therapies per order  - Administer medications as ordered  - Instruct and encourage patient and family to use good hand hygiene technique  - Identify and instruct in appropriate isolation precautions for identified infection/condition  Outcome: Progressing  Goal: Absence of fever/infection during neutropenic period  Description: INTERVENTIONS:  - Monitor WBC    Outcome: Progressing     Problem: DISCHARGE PLANNING  Goal: Discharge to home or other facility with appropriate resources  Description: INTERVENTIONS:  - Identify barriers to discharge w/patient and caregiver  - Arrange for needed discharge resources and transportation as appropriate  - Identify discharge learning needs (meds, wound care, etc.)  - Arrange for interpretive services to assist at discharge as needed  - Refer to Case Management Department for coordinating discharge planning if the patient needs post-hospital services based on physician/advanced practitioner order or complex needs related to functional status, cognitive ability, or social support system  Outcome: Progressing     Problem: Knowledge Deficit  Goal: Patient/family/caregiver demonstrates understanding of disease process, treatment plan, medications, and discharge instructions  Description: Complete learning assessment and assess knowledge base.  Interventions:  - Provide teaching at level of understanding  - Provide teaching via preferred learning methods  Outcome: Progressing     Problem: Nutrition/Hydration-ADULT  Goal: Nutrient/Hydration intake appropriate for  improving, restoring or maintaining nutritional needs  Description: Monitor and assess patient's nutrition/hydration status for malnutrition. Collaborate with interdisciplinary team and initiate plan and interventions as ordered.  Monitor patient's weight and dietary intake as ordered or per policy. Utilize nutrition screening tool and intervene as necessary. Determine patient's food preferences and provide high-protein, high-caloric foods as appropriate.     INTERVENTIONS:  - Monitor oral intake, urinary output, labs, and treatment plans  - Assess nutrition and hydration status and recommend course of action  - Evaluate amount of meals eaten  - Assist patient with eating if necessary   - Allow adequate time for meals  - Recommend/ encourage appropriate diets, oral nutritional supplements, and vitamin/mineral supplements  - Order, calculate, and assess calorie counts as needed  - Recommend, monitor, and adjust tube feedings and TPN/PPN based on assessed needs  - Assess need for intravenous fluids  - Provide specific nutrition/hydration education as appropriate  - Include patient/family/caregiver in decisions related to nutrition  Outcome: Progressing

## 2024-03-08 NOTE — ASSESSMENT & PLAN NOTE
S/p Laproscopic Open incisional hernia repair with mesh. Extenisve lysis of adhesions. Partial omentectomy  POD #4, currently on clear liquid diet  Care per primary team

## 2024-03-08 NOTE — ASSESSMENT & PLAN NOTE
"/76   Pulse 100   Temp 97.9 °F (36.6 °C)   Resp 20   Ht 5' 4\" (1.626 m)   Wt (!) 138 kg (303 lb 9.2 oz)   SpO2 96%   BMI 52.11 kg/m²   Acceptable pressures  Started on Lopressor 25 mg twice daily  Continue as needed labetalol for systolic blood pressure greater than 180    "

## 2024-03-09 VITALS
OXYGEN SATURATION: 94 % | TEMPERATURE: 97.8 F | SYSTOLIC BLOOD PRESSURE: 148 MMHG | BODY MASS INDEX: 50.02 KG/M2 | RESPIRATION RATE: 21 BRPM | HEART RATE: 100 BPM | WEIGHT: 293 LBS | DIASTOLIC BLOOD PRESSURE: 83 MMHG | HEIGHT: 64 IN

## 2024-03-09 LAB
ANION GAP SERPL CALCULATED.3IONS-SCNC: 7 MMOL/L
BASOPHILS # BLD AUTO: 0.03 THOUSANDS/ÂΜL (ref 0–0.1)
BASOPHILS NFR BLD AUTO: 0 % (ref 0–1)
BUN SERPL-MCNC: 20 MG/DL (ref 5–25)
CALCIUM SERPL-MCNC: 8.5 MG/DL (ref 8.4–10.2)
CHLORIDE SERPL-SCNC: 102 MMOL/L (ref 96–108)
CO2 SERPL-SCNC: 28 MMOL/L (ref 21–32)
CREAT SERPL-MCNC: 0.67 MG/DL (ref 0.6–1.3)
EOSINOPHIL # BLD AUTO: 0.1 THOUSAND/ÂΜL (ref 0–0.61)
EOSINOPHIL NFR BLD AUTO: 1 % (ref 0–6)
ERYTHROCYTE [DISTWIDTH] IN BLOOD BY AUTOMATED COUNT: 12.4 % (ref 11.6–15.1)
GFR SERPL CREATININE-BSD FRML MDRD: 89 ML/MIN/1.73SQ M
GLUCOSE SERPL-MCNC: 116 MG/DL (ref 65–140)
HCT VFR BLD AUTO: 38.4 % (ref 34.8–46.1)
HGB BLD-MCNC: 12.8 G/DL (ref 11.5–15.4)
IMM GRANULOCYTES # BLD AUTO: 0.15 THOUSAND/UL (ref 0–0.2)
IMM GRANULOCYTES NFR BLD AUTO: 2 % (ref 0–2)
LYMPHOCYTES # BLD AUTO: 1.91 THOUSANDS/ÂΜL (ref 0.6–4.47)
LYMPHOCYTES NFR BLD AUTO: 23 % (ref 14–44)
MAGNESIUM SERPL-MCNC: 2.1 MG/DL (ref 1.9–2.7)
MCH RBC QN AUTO: 30.8 PG (ref 26.8–34.3)
MCHC RBC AUTO-ENTMCNC: 33.3 G/DL (ref 31.4–37.4)
MCV RBC AUTO: 93 FL (ref 82–98)
MONOCYTES # BLD AUTO: 0.55 THOUSAND/ÂΜL (ref 0.17–1.22)
MONOCYTES NFR BLD AUTO: 7 % (ref 4–12)
NEUTROPHILS # BLD AUTO: 5.48 THOUSANDS/ÂΜL (ref 1.85–7.62)
NEUTS SEG NFR BLD AUTO: 67 % (ref 43–75)
NRBC BLD AUTO-RTO: 0 /100 WBCS
PLATELET # BLD AUTO: 253 THOUSANDS/UL (ref 149–390)
PMV BLD AUTO: 10.2 FL (ref 8.9–12.7)
POTASSIUM SERPL-SCNC: 3.9 MMOL/L (ref 3.5–5.3)
RBC # BLD AUTO: 4.15 MILLION/UL (ref 3.81–5.12)
SODIUM SERPL-SCNC: 137 MMOL/L (ref 135–147)
WBC # BLD AUTO: 8.22 THOUSAND/UL (ref 4.31–10.16)

## 2024-03-09 PROCEDURE — 80048 BASIC METABOLIC PNL TOTAL CA: CPT | Performed by: PHYSICIAN ASSISTANT

## 2024-03-09 PROCEDURE — 99024 POSTOP FOLLOW-UP VISIT: CPT | Performed by: PHYSICIAN ASSISTANT

## 2024-03-09 PROCEDURE — 99233 SBSQ HOSP IP/OBS HIGH 50: CPT | Performed by: FAMILY MEDICINE

## 2024-03-09 PROCEDURE — 85025 COMPLETE CBC W/AUTO DIFF WBC: CPT | Performed by: PHYSICIAN ASSISTANT

## 2024-03-09 PROCEDURE — 83735 ASSAY OF MAGNESIUM: CPT | Performed by: FAMILY MEDICINE

## 2024-03-09 RX ORDER — ERGOCALCIFEROL 1.25 MG/1
50000 CAPSULE ORAL WEEKLY
Qty: 4 CAPSULE | Refills: 0 | Status: SHIPPED | OUTPATIENT
Start: 2024-03-14 | End: 2024-03-12 | Stop reason: DRUGHIGH

## 2024-03-09 RX ORDER — AMLODIPINE BESYLATE 5 MG/1
5 TABLET ORAL DAILY
Status: DISCONTINUED | OUTPATIENT
Start: 2024-03-09 | End: 2024-03-09

## 2024-03-09 RX ORDER — OXYCODONE HYDROCHLORIDE 5 MG/1
5 TABLET ORAL EVERY 4 HOURS PRN
Qty: 12 TABLET | Refills: 0 | Status: SHIPPED | OUTPATIENT
Start: 2024-03-09 | End: 2024-03-12 | Stop reason: HOSPADM

## 2024-03-09 RX ADMIN — FAMOTIDINE 20 MG: 10 INJECTION, SOLUTION INTRAVENOUS at 09:20

## 2024-03-09 RX ADMIN — METOPROLOL TARTRATE 25 MG: 25 TABLET, FILM COATED ORAL at 08:55

## 2024-03-09 RX ADMIN — HEPARIN SODIUM 7500 UNITS: 5000 INJECTION INTRAVENOUS; SUBCUTANEOUS at 14:21

## 2024-03-09 RX ADMIN — HEPARIN SODIUM 7500 UNITS: 5000 INJECTION INTRAVENOUS; SUBCUTANEOUS at 06:05

## 2024-03-09 RX ADMIN — DOCUSATE SODIUM 100 MG: 100 CAPSULE, LIQUID FILLED ORAL at 08:56

## 2024-03-09 NOTE — ED PROVIDER NOTES
History  Chief Complaint   Patient presents with    Abdominal Pain     Abdominal pain with N/V since friday     HPI    Patient is a 68-year-old female presenting with abdominal pain for the last several days.  Patient has reported nausea and vomiting.  Denies any change in bowel output or bladder habits.  Denies any recent fevers or chills.  Patient presents due to the persistence of discomfort.  Nothing seems to make it better or worse.  History includes cholecystectomy and hernia repair.  Former smoker.    None       History reviewed. No pertinent past medical history.    Past Surgical History:   Procedure Laterality Date    CHOLECYSTECTOMY      HERNIA REPAIR Right 3/4/2024    Procedure: Laproscopic Open incisional hernia repair with mesh, Extenisve lysis of adhesions, Partial omentectomy;  Surgeon: Jesus Gallo MD;  Location: [facility];  Service: General       History reviewed. No pertinent family history.  I have reviewed and agree with the history as documented.    E-Cigarette/Vaping     E-Cigarette/Vaping Substances     Social History     Tobacco Use    Smoking status: Former     Types: Cigarettes     Start date: 1992     Quit date: 1972     Years since quittin.2    Smokeless tobacco: Never   Substance Use Topics    Alcohol use: Not Currently    Drug use: Never       Review of Systems   Constitutional:  Negative for chills and fever.   HENT:  Negative for ear pain and sore throat.    Eyes:  Negative for pain and visual disturbance.   Respiratory:  Negative for cough and shortness of breath.    Cardiovascular:  Negative for chest pain and palpitations.   Gastrointestinal:  Positive for abdominal pain, nausea and vomiting.   Genitourinary:  Negative for dysuria and hematuria.   Musculoskeletal:  Negative for arthralgias and back pain.   Skin:  Negative for color change and rash.   Neurological:  Negative for seizures and syncope.   All other systems reviewed and are negative.      Physical  Exam  Physical Exam  Vitals and nursing note reviewed.   Constitutional:       General: She is not in acute distress.     Appearance: She is well-developed.   HENT:      Head: Normocephalic and atraumatic.   Eyes:      Conjunctiva/sclera: Conjunctivae normal.   Cardiovascular:      Rate and Rhythm: Normal rate and regular rhythm.      Heart sounds: No murmur heard.  Pulmonary:      Effort: Pulmonary effort is normal. No respiratory distress.      Breath sounds: Normal breath sounds.   Abdominal:      Palpations: Abdomen is soft.      Tenderness: There is generalized abdominal tenderness.   Musculoskeletal:         General: No swelling.      Cervical back: Neck supple.   Skin:     General: Skin is warm and dry.      Capillary Refill: Capillary refill takes less than 2 seconds.   Neurological:      Mental Status: She is alert.   Psychiatric:         Mood and Affect: Mood normal.         Vital Signs  ED Triage Vitals   Temperature Pulse Respirations Blood Pressure SpO2   02/27/24 1624 02/27/24 1624 02/27/24 1624 02/27/24 1624 02/27/24 1624   97.9 °F (36.6 °C) (!) 113 20 (!) 193/87 99 %      Temp Source Heart Rate Source Patient Position - Orthostatic VS BP Location FiO2 (%)   02/27/24 1624 02/27/24 1624 02/27/24 1624 02/27/24 1624 --   Temporal Monitor Sitting Left arm       Pain Score       02/28/24 1401       No Pain           Vitals:    02/29/24 1521 02/29/24 1543 02/29/24 2143 03/01/24 0736   BP: 127/60 127/60 118/62 121/59   Pulse: 95 92 87 73   Patient Position - Orthostatic VS:             Visual Acuity      ED Medications  Medications   ondansetron (ZOFRAN-ODT) dispersible tablet 4 mg (4 mg Oral Given 2/27/24 1630)   sodium chloride 0.9 % bolus 1,000 mL (0 mL Intravenous Stopped 2/27/24 2048)   morphine injection 4 mg (4 mg Intravenous Given 2/27/24 1816)   iohexol (OMNIPAQUE) 350 MG/ML injection (MULTI-DOSE) 100 mL (100 mL Intravenous Given 2/27/24 1903)   sodium chloride 0.9 % bolus 1,000 mL (0 mL Intravenous  Stopped 2/28/24 0009)   ceFAZolin (ANCEF) IVPB (premix in dextrose) 2,000 mg 50 mL (0 mg Intravenous Stopped 2/28/24 0147)       Diagnostic Studies  Results Reviewed       Procedure Component Value Units Date/Time    Blood culture [[id number]] Collected: 02/28/24 0105    Lab Status: Final result Specimen: Blood from Arm, Left Updated: 03/04/24 0901     Blood Culture No Growth After 5 Days.    Blood culture [[id number]] Collected: 02/28/24 0105    Lab Status: Final result Specimen: Blood from Arm, Right Updated: 03/04/24 0901     Blood Culture No Growth After 5 Days.    Prealbumin [[id number]]  (Normal) Collected: 02/27/24 2153    Lab Status: Final result Specimen: Blood from Arm, Left Updated: 02/28/24 1540     Prealbumin 22.9 mg/dL     Hemoglobin A1C [[id number]]  (Abnormal) Collected: 02/27/24 2153    Lab Status: Final result Specimen: Blood from Arm, Left Updated: 02/28/24 0851     Hemoglobin A1C 5.7 %       mg/dl     Comprehensive metabolic panel [[id number]] Collected: 02/28/24 0452    Lab Status: Final result Specimen: Blood from Arm, Left Updated: 02/28/24 0520     Sodium 137 mmol/L      Potassium 4.1 mmol/L      Chloride 103 mmol/L      CO2 25 mmol/L      ANION GAP 9 mmol/L      BUN 15 mg/dL      Creatinine 0.79 mg/dL      Glucose 137 mg/dL      Calcium 8.8 mg/dL      AST 18 U/L      ALT 21 U/L      Alkaline Phosphatase 99 U/L      Total Protein 7.2 g/dL      Albumin 3.9 g/dL      Total Bilirubin 0.46 mg/dL      eGFR 76 ml/min/1.73sq m     Narrative:      National Kid[employer] Disease Foundation guidelines for Chronic Kidney Disease (CKD):     Stage 1 with normal or high GFR (GFR > 90 mL/min/1.73 square meters)    Stage 2 Mild CKD (GFR = 60-89 mL/min/1.73 square meters)    Stage 3A Moderate CKD (GFR = 45-59 mL/min/1.73 square meters)    Stage 3B Moderate CKD (GFR = 30-44 mL/min/1.73 square meters)    Stage 4 Severe CKD (GFR = 15-29 mL/min/1.73 square meters)    Stage 5 End Stage CKD (GFR <15 mL/min/1.73 square  meters)  Note: GFR calculation is accurate only with a steady state creatinine    Magnesium [[id number]]  (Normal) Collected: 02/28/24 0452    Lab Status: Final result Specimen: Blood from Arm, Left Updated: 02/28/24 0520     Magnesium 2.1 mg/dL     Phosphorus [[id number]]  (Abnormal) Collected: 02/28/24 0452    Lab Status: Final result Specimen: Blood from Arm, Left Updated: 02/28/24 0520     Phosphorus 4.2 mg/dL     CBC and differential [[id number]]  (Abnormal) Collected: 02/28/24 0452    Lab Status: Final result Specimen: Blood from Arm, Left Updated: 02/28/24 0501     WBC 10.82 Thousand/uL      RBC 4.89 Million/uL      Hemoglobin 15.2 g/dL      Hematocrit 44.8 %      MCV 92 fL      MCH 31.1 pg      MCHC 33.9 g/dL      RDW 12.2 %      MPV 10.2 fL      Platelets 230 Thousands/uL      nRBC 0 /100 WBCs      Neutrophils Relative 85 %      Immat GRANS % 0 %      Lymphocytes Relative 11 %      Monocytes Relative 4 %      Eosinophils Relative 0 %      Basophils Relative 0 %      Neutrophils Absolute 9.23 Thousands/µL      Immature Grans Absolute 0.04 Thousand/uL      Lymphocytes Absolute 1.14 Thousands/µL      Monocytes Absolute 0.41 Thousand/µL      Eosinophils Absolute 0.00 Thousand/µL      Basophils Absolute 0.00 Thousands/µL     Urine Microscopic [[id number]]  (Abnormal) Collected: 02/27/24 2014    Lab Status: Final result Specimen: Urine, Clean Catch Updated: 02/27/24 2039     RBC, UA 1-2 /hpf      WBC, UA 4-10 /hpf      Epithelial Cells Occasional /hpf      Bacteria, UA None Seen /hpf      MUCUS THREADS Occasional    UA w Reflex to Microscopic w Reflex to Culture [[id number]]  (Abnormal) Collected: 02/27/24 2014    Lab Status: Final result Specimen: Urine, Clean Catch Updated: 02/27/24 2037     Color, UA Light Yellow     Clarity, UA Clear     Specific Gravity, UA >=1.050     pH, UA 7.0     Leukocytes, UA Negative     Nitrite, UA Negative     Protein, UA 30 (1+) mg/dl      Glucose, UA Negative mg/dl      Ketones, UA  Negative mg/dl      Urobilinogen, UA <2.0 mg/dl      Bilirubin, UA Negative     Occult Blood, UA Trace    Comprehensive metabolic panel [[id number]]  (Abnormal) Collected: 02/27/24 1634    Lab Status: Final result Specimen: Blood from Arm, Right Updated: 02/27/24 1704     Sodium 137 mmol/L      Potassium 4.0 mmol/L      Chloride 101 mmol/L      CO2 24 mmol/L      ANION GAP 12 mmol/L      BUN 16 mg/dL      Creatinine 0.84 mg/dL      Glucose 180 mg/dL      Calcium 9.7 mg/dL      AST 25 U/L      ALT 26 U/L      Alkaline Phosphatase 114 U/L      Total Protein 8.2 g/dL      Albumin 4.4 g/dL      Total Bilirubin 0.56 mg/dL      eGFR 71 ml/min/1.73sq m     Narrative:      National Kidney Disease Foundation guidelines for Chronic Kidney Disease (CKD):     Stage 1 with normal or high GFR (GFR > 90 mL/min/1.73 square meters)    Stage 2 Mild CKD (GFR = 60-89 mL/min/1.73 square meters)    Stage 3A Moderate CKD (GFR = 45-59 mL/min/1.73 square meters)    Stage 3B Moderate CKD (GFR = 30-44 mL/min/1.73 square meters)    Stage 4 Severe CKD (GFR = 15-29 mL/min/1.73 square meters)    Stage 5 End Stage CKD (GFR <15 mL/min/1.73 square meters)  Note: GFR calculation is accurate only with a steady state creatinine    Lipase [[id number]]  (Abnormal) Collected: 02/27/24 1634    Lab Status: Final result Specimen: Blood from Arm, Right Updated: 02/27/24 1704     Lipase 9 u/L     CBC and differential [[id number]]  (Abnormal) Collected: 02/27/24 1634    Lab Status: Final result Specimen: Blood from Arm, Right Updated: 02/27/24 1641     WBC 12.41 Thousand/uL      RBC 5.34 Million/uL      Hemoglobin 16.6 g/dL      Hematocrit 48.7 %      MCV 91 fL      MCH 31.1 pg      MCHC 34.1 g/dL      RDW 12.0 %      MPV 10.1 fL      Platelets 254 Thousands/uL      nRBC 0 /100 WBCs      Neutrophils Relative 85 %      Immat GRANS % 1 %      Lymphocytes Relative 11 %      Monocytes Relative 3 %      Eosinophils Relative 0 %      Basophils Relative 0 %       Neutrophils Absolute 10.59 Thousands/µL      Immature Grans Absolute 0.07 Thousand/uL      Lymphocytes Absolute 1.40 Thousands/µL      Monocytes Absolute 0.32 Thousand/µL      Eosinophils Absolute 0.00 Thousand/µL      Basophils Absolute 0.03 Thousands/µL                    CT abdomen pelvis with contrast   Final Result by Arcihe Moses DO (02/27 2043)      Small bowel obstruction involving multiple loops throughout the abdomen, extending into a large ventral abdominal hernia. The transition point is located within the hernia sac.         I personally discussed this study with PEEWEE FIELD on 2/27/2024 8:43 PM.               Workstation performed: [id number]                    Procedures  Procedures         ED Course                                             Medical Decision Making  Differential hernia, small bowel obstruction, gastroenteritis.    Patient is a 68-year-old female present emerged department no acute respiratory distress and vital signs overall unremarkable.  Lab work shows some signs of dehydration.  Patient's CAT scan shows small bowel obstruction in the ventral hernia.    Reached out to general surgery who will be admitting the patient.    Discussed test results and findings with patient at bedside.  Discussed need for evaluation and management with general surgery.  Patient verbalized understanding.  Disposition admission.    Amount and/or Complexity of Data Reviewed  Labs: ordered.  Radiology: ordered.    Risk  Prescription drug management.  Decision regarding hospitalization.             Disposition  Final diagnoses:   Abdominal pain   Small bowel obstruction (HCC)     Time reflects when diagnosis was documented in both MDM as applicable and the Disposition within this note       Time User Action Codes Description Comment    2/27/2024  9:17 PM Jesus Gallo Add [I10] Primary hypertension     2/28/2024 10:11 AM Smith Cunningham Add [E66.01] Morbid obesity (HCC)     2/28/2024 10:11 AM Marisa  Smith Add [I48.0] Paroxysmal A-fib (HCC)     2/29/2024 10:53 PM Magui Garcia Add [R10.9] Abdominal pain     2/29/2024 10:54 PM Magui Garcia Add [K56.609] Small bowel obstruction (HCC)     3/1/2024 12:07 PM Shirley Kemp Add [I10] Hypertension, unspecified type           ED Disposition       ED Disposition   Admit    Condition   Stable    Date/Time   Tue Feb 27, 2024  9:24 PM    Comment   Case was discussed with surgery and the patient's admission status was agreed to be Admission Status: inpatient status to the service of Dr. Gallo .               Follow-up Information       Follow up With Specialties Details Why Contact Info    Serge De La Torre MD General Surgery Follow up in 2 day(s)  [address]  [facility] [address] [phone]              There are no discharge medications for this patient.      Outpatient Discharge Orders   Ambulatory Referral to Family Practice   Standing Status: Future Standing Exp. Date: 03/01/25      Discharge Diet     Lifting restrictions     No strenuous exercise     Shower Daily     Call provider for:  persistent nausea or vomiting     Call provider for:  severe uncontrolled pain     Call provider for:  redness, tenderness, or signs of infection (pain, swelling, redness, odor or green/yellow discharge around incision site)     Call provider for: active or persistent bleeding     Call provider for:  difficulty breathing, headache or visual disturbances     Call provider for:  hives     Call provider for:  persistent dizziness or light-headedness     Call provider for:  extreme fatigue       PDMP Review       None            ED Provider  Electronically Signed by             Magui Garcia DO  03/08/24 2045

## 2024-03-09 NOTE — DISCHARGE SUMMARY
"  Discharge Date: Discharge Summary - Ivonne Marcos 68 y.o. female MRN: [id number]    Unit/Bed#: -01 Encounter: [id number]    Admission Date: 3/3/2024   Discharge Date: 3/9/2024    Admitting Diagnosis:   Nausea [R11.0]  Epigastric pain [R10.13]  SBO (small bowel obstruction) (HCC) [K56.609]  Abdominal pain [R10.9]  Generalized abdominal pain [R10.84]    Principle/ Secondary Diagnosis:  History reviewed. No pertinent past medical history.  Past Surgical History:   Procedure Laterality Date    CHOLECYSTECTOMY      HERNIA REPAIR Right 3/4/2024    Procedure: Laproscopic Open incisional hernia repair with mesh, Extenisve lysis of adhesions, Partial omentectomy;  Surgeon: Jesus Gallo MD;  Location: [facility];  Service: General       Discharge Diagnoses:   Principal Problem:    Incarcerated incisional hernia  Active Problems:    Small bowel obstruction (HCC)    Morbid obesity (HCC)    Hypertension    Hypocalcemia    Hyponatremia    Hypomagnesemia    Shortness of breath    Profound Vitamin D deficiency      Procedures Performed:  No orders of the defined types were placed in this encounter.    Laproscopic Open incisional hernia repair with mesh, Extenisve lysis of adhesions, Partial omentectomy (Right: Groin)  Procedure(s):  Laproscopic Open incisional hernia repair with mesh, Extenisve lysis of adhesions, Partial omentectomy    Consultants:     Internal Medicine    HPI: As per Smith Cunningham PA-C: \"Ivonne Marcos is a 68 y.o. female morbid obesity, who presents to the emergency department with recurrence of severe abdominal pain, and decreased appetite.  Patient was seen in the emergency department on 2/28/2024 for abdominal pain, nausea, vomiting, but hernia was reducible and patient had return of bowel function so she was discharged home with plan to follow-up outpatient.  Patient has known history of ventral hernia that developed shortly after undergoing laparoscopic cholecystectomy approximately 10 years " "ago, but it only began causing the symptoms recently, with this being the second time she seeks help from medical providers.  Patient reports of a sedentary lifestyle, and poor diet.  Patient underwent cardiology workup on her last hospitalization and was cleared.  Patient reports that when she came in she had severe generalized abdominal pain, and tenderness over hernia, but did have a bowel movement yesterday, however, no flatus or bowel movements since with lots of belching.\"    Summary of Hospital Course: The patient was admitted to the hospital and underwent surgical intervention. Notable events are as listed below:    3/4 - OR for diagnostic laparoscopy, open repair of incisional hernia with mesh placement and partial omentectomy. Remained NPO with NGT decompression postoperatively  3/5 - Increased LR to 150cc/hr for marginal urine output  3/6 - [place] catheter removed, NGT clamped, CXR obtained no significant pulmonary findings   3/7 - NGT removed, started on clear liquid diet and advanced as tolerated, Vitamin D supplementation started   3/8 - advanced to soft diet   3/9 - tolerating soft diet, exhibiting regular bowel function, KWESI drains serosanguinous. Discharged home with VNA and instructions to follow up in 2 weeks for routine postoperative visit. Given script for Vitamin D and Labetalol.       Physical Exam: /83   Pulse 100   Temp 97.8 °F (36.6 °C)   Resp 21   Ht 5' 4\" (1.626 m)   Wt (!) 138 kg (303 lb 5.7 oz)   SpO2 94%   BMI 52.07 kg/m²   General appearance: alert and oriented, in no acute distress  Lungs: clear to auscultation bilaterally  Heart: regular rate and rhythm, S1, S2 normal, no murmur, click, rub or gallop  Abdomen:  soft, obese, nondistended, normoactive bowel sounds, midline incision c/d/I covered with mepilex dressing, abdominal binder in place, KWESI drains x2 with serosanguinous output   Extremities: extremities normal, warm and well-perfused; no cyanosis, clubbing, or " edema    Significant Findings, Care, Treatment and Services Provided: See Above  XR chest portable    Result Date: 3/6/2024  Impression: No acute cardiopulmonary disease. NG tube in stomach. Workstation performed: [id number]     XR abdomen 1 view kub    Result Date: 3/5/2024  Impression: There is no evidence of radiopaque retained surgical instrument in the right abdomen or abdominal midline Workstation performed: [id number]     CT abdomen pelvis with contrast    Result Date: 3/3/2024  Impression: Multiple fluid-filled small bowel loops with transition point within a large lobulated right lower quadrant ventral abdominal wall hernia compatible with small bowel obstruction. There are dilated loops of small bowel which also abruptly change in caliber at the entrance and exit of the hernia, closed-loop obstruction cannot be excluded. Surgical consultation is recommended. I personally discussed this study with CATHY BA on 3/3/2024 8:19 PM. Workstation performed: [id number]       Complications: None    Discharge Diagnosis: Incarcerated ventral hernia with SBO s/p laparoscopic converted to open incisional hernia repair with mesh     Medical Problems       Resolved Problems  Date Reviewed: 3/9/2024   None       Principal Problem:    Incarcerated incisional hernia  Active Problems:    Small bowel obstruction (HCC)    Morbid obesity (HCC)    Hypertension    Hypocalcemia    Hyponatremia    Hypomagnesemia    Shortness of breath    Profound Vitamin D deficiency      Condition at Discharge: good         Discharge instructions/Information to patient and family:   See after visit summary for information provided to patient and family.      Provisions for Follow-Up Care:  See after visit summary for information related to follow-up care and any pertinent home health orders.      PCP: No primary care provider on file.    Disposition: See After Visit Summary for discharge disposition information.    Planned Readmission:  No    Discharge Statement   I spent 30 minutes discharging the patient. This time was spent on the day of discharge. I had direct contact with the patient on the day of discharge. Additional documentation is required if more than 30 minutes were spent on discharge.     Discharge Medications:  See after visit summary for reconciled discharge medications provided to patient and family.

## 2024-03-09 NOTE — ASSESSMENT & PLAN NOTE
S/p Laproscopic Open incisional hernia repair with mesh. Extenisve lysis of adhesions. Partial omentectomy  POD #5, diet as per surgery: Surgical soft  Care per primary team

## 2024-03-09 NOTE — DISCHARGE INSTR - AVS FIRST PAGE
Follow up:  Following discharge from the hospital call the office in 1-2 days to set up a post operative appointment to be seen in 2 weeks by Dr. Gallo   [phone]  Call the office if you have increased pain not relieved with pain medicine.  Call the office if you have a fever,redness, the wound opens up, you have pus draining from your incision.     AFTER YOU LEAVE: Following discharge from the hospital, you may have some questions about your procedure, your activities or your general condition.  These instructions may answer some of your questions and help you adjust during the first few days following your operation.  You can expect to be sore and tender mostly around the incisions. This pain should last approximally 5 days and gradually improve daily.          Incisions:    - You may apply ice to the incisions to help with pain. Avoid heat as this may make the glue tacky   - It is normal to have some bruising, swelling or mild discoloration around the incision.  If increasing redness or pain develops, call our office immediately.   Do not apply any creams, lotions, or ointments.  - PLEASE WEAR YOUR ABDOMINAL BINDER AT ALL TIMES WHEN OUT OF BED    If you have dressings:  - You may remove the gauze dressing from your incisions 48 hours after surgery. Underneath this dressing is a tape like dressing called Steri Strips. Leave the steri strips in place for 5-7 days. They may fall off on their own. This is OK.    Bathing:   - You may shower daily with soap and water the day after the procedure. It is OK to GENTLY wash the incision with soap and water then pat dry.  DO NOT SCRUB. Do NOT soak incision in a tub, pool, or hot tub for 2 weeks.    Diet:   - Resume your normal diet unless specified otherwise.  We recommend you slowly advance your diet. Try to start with softer bland foods and gradually advance as tolerated. Be sure to consume plenty of water.  Avoid alcohol.        Activity/Restrictions:   - The evening  following the procedure you should rest as much as possible, sitting, lying or reclining.  you should be sure someone remains with you until the next morning.  Gradually increase your activity daily. Walking 3-4 times daily is good and stairs are ok.  Listen to your body.  If you start to get tired or sore then rest.   - No strenuous activity or exercise for 3-4 weeks.   - No heavy lifting, pushing or pulling.   - No driving for 5 days or while taking narcotics for pain.       Return or work: You may return to work or other activities as soon as your pain is controlled and you feel comfortable. For many people, this is 5 to 7 days after surgery. If your job requires heavy lifting you will need to be on light duty for 2-3 weeks.     Medication:   - If you were given a prescription for Percocet, Norco, or Vicodin for pain be sure to eat prior to taking as these medications as they may cause nausea and vomiting on an empty stomach.    - If you do not want to take stronger medications for pain, you may take Tylenol, Aleve OR Ibuprofen  - DO NOT take Tylenol  (acetaminophen) with these medication for a fever or for further pain control as these medications already contain Tylenol in them. Take one or the other.  Do not exceed more than 4000 mg of acetaminophen in 24 hours or 3000 mg if you have liver disease.   - If you were given an antibiotic take it until it is finished.    Contact your healthcare provider if:   You have a fever over 101°F (38°C) or chills.    You have pain or nausea that is not relieved by medicine.    You have redness and swelling around your incisions, or blood or pus is leaking from your incisions.    You are constipated or have diarrhea.    Your skin or eyes are yellow, or your bowel movements are pale.    You have questions or concerns about your surgery, condition, or care.  Seek care immediately or call 911 if:   You cannot stop vomiting.    Your bowel movements are black or bloody.    You  have pain in your abdomen and it is swollen or hard.    Your arm or leg feels warm, tender, and painful. It may look swollen and red.   You feel lightheaded, short of breath, and have chest pain.    You cough up blood.

## 2024-03-09 NOTE — PROGRESS NOTES
"[facility]  Progress Note  Name: Ivonne Marcos I  MRN: [id number]  Unit/Bed#: -01 I Date of Admission: 3/3/2024   Date of Service: 3/9/2024 I Hospital Day: 5    Assessment/Plan   * Incarcerated incisional hernia  Assessment & Plan  S/p Laproscopic Open incisional hernia repair with mesh. Extenisve lysis of adhesions. Partial omentectomy  POD #5, diet as per surgery: Surgical soft  Care per primary team     Hypertension  Assessment & Plan  /83   Pulse 100   Temp 97.8 °F (36.6 °C)   Resp 21   Ht 5' 4\" (1.626 m)   Wt (!) 138 kg (303 lb 5.7 oz)   SpO2 94%   BMI 52.07 kg/m²   Acceptable pressures  Started on Lopressor 25 mg twice daily, continue   Continue as needed labetalol for systolic blood pressure greater than 180      Profound Vitamin D deficiency  Assessment & Plan  Vitamin D level is less than 7  Start ergocalciferol 50,000 mcg once a week x 8 doses  Will need to follow-up outpatient with PCP    Shortness of breath  Assessment & Plan  Resolved  X-ray shows no fluid overload  Will hold off on any IV diuresis at this point.  No pneumonia noted  Shortness of breath secondary to deconditioning and pain from postop status with ambulation    Hypomagnesemia  Assessment & Plan  Resolved  Status post 2 g IV magnesium sulfate on 3/6/2024    Hyponatremia  Assessment & Plan  Resolved  Continue to monitor daily    Hypocalcemia  Assessment & Plan  Ionized calcium has improved  Calcium improved with IV supplementation  Noted to have severe low levels of vitamin D.  Start supplementation  Status post 2 g IV calcium gluconate  Monitor on telemetry  Stable magnesium      Morbid obesity (HCC)  Assessment & Plan  BMI>60, contributing to medical complexity  Counseling for weight loss done      Small bowel obstruction (HCC)  Assessment & Plan  Recent admission for the same issue, resolved without intervention at that time  Continue care per primary team   Diet as per primary team       "         VTE Pharmacologic Prophylaxis:    heparin    Mobility:   Basic Mobility Inpatient Raw Score: 22  JH-HLM Goal: 7: Walk 25 feet or more  JH-HLM Achieved: 7: Walk 25 feet or more  HLM Goal achieved. Continue to encourage appropriate mobility.    Patient Centered Rounds: I performed bedside rounds with nursing staff today.   Discussions with Specialists or Other Care Team Provider: surgery    Education and Discussions with Family / Patient:  patient updated.     Total Time Spent on Date of Encounter in care of patient: 35 mins. This time was spent on one or more of the following: performing physical exam; counseling and coordination of care; obtaining or reviewing history; documenting in the medical record; reviewing/ordering tests, medications or procedures; communicating with other healthcare professionals and discussing with patient's family/caregivers.    Current Length of Stay: 5 day(s)  Current Patient Status: Inpatient   Certification Statement:  medically cleared for DC   Discharge Plan: [facility] is following this patient on consult. They are medically stable for discharge when deemed appropriate by primary service.    Code Status: Level 1 - Full Code    Subjective:   Pt seen and examined at bedside during AM rounds  Pt is eating her food comfortably  Denies any sob NVD or fever  No acute overnight issues  I discussed with her the importance of getting a sleep study done as OP after discussing with PCP as she does have sob and elevated BP     Objective:     Vitals:   Temp (24hrs), Av.9 °F (36.6 °C), Min:97.8 °F (36.6 °C), Max:98.2 °F (36.8 °C)    Temp:  [97.8 °F (36.6 °C)-98.2 °F (36.8 °C)] 97.8 °F (36.6 °C)  HR:  [100] 100  Resp:  [21] 21  BP: (148-168)/(83-90) 148/83  SpO2:  [94 %] 94 %  Body mass index is 52.07 kg/m².     Input and Output Summary (last 24 hours):     Intake/Output Summary (Last 24 hours) at 3/9/2024 0942  Last data filed at 3/9/2024 0900  Gross per 24 hour   Intake 250 ml   Output 950  ml   Net -700 ml       Physical Exam:   Physical Exam General- Awake, alert and oriented x 3, looks comfortable  HEENT- Normocephalic, atraumatic, oral mucosa- moist  Neck- Supple, No carotid bruit, no JVD  CVS- Normal S1/ S2, Regular rate and rhythm, No murmur, trace edema  Respiratory system- B/L clear breath sounds, no wheezing  Abdomen- Soft, Non distended, mild post op tenderness, Bowel sound- present 4 quads, abd drain in place  Genitourinary- No suprapubic tenderness, No CVA tenderness  Skin- No new bruise or rash  Musculoskeletal- No gross deformity  Psych- No acute psychosis  CNS- CN II- XII grossly intact, No acute focal neurologic deficit noted      Additional Data:     Labs:  Results from last 7 days   Lab Units 03/09/24  0616   WBC Thousand/uL 8.22   HEMOGLOBIN g/dL 12.8   HEMATOCRIT % 38.4   PLATELETS Thousands/uL 253   NEUTROS PCT % 67   LYMPHS PCT % 23   MONOS PCT % 7   EOS PCT % 1     Results from last 7 days   Lab Units 03/09/24  0616 03/05/24  0443 03/03/24  1747   SODIUM mmol/L 137   < > 135   POTASSIUM mmol/L 3.9   < > 3.7   CHLORIDE mmol/L 102   < > 99   CO2 mmol/L 28   < > 26   BUN mg/dL 20   < > 12   CREATININE mg/dL 0.67   < > 0.83   ANION GAP mmol/L 7   < > 10   CALCIUM mg/dL 8.5   < > 9.2   ALBUMIN g/dL  --   --  4.1   TOTAL BILIRUBIN mg/dL  --   --  0.72   ALK PHOS U/L  --   --  102   ALT U/L  --   --  35   AST U/L  --   --  33   GLUCOSE RANDOM mg/dL 116   < > 154*    < > = values in this interval not displayed.         Results from last 7 days   Lab Units 03/07/24  1522 03/07/24  0540 03/06/24  2356 03/06/24  1804 03/06/24  1449 03/06/24  0453 03/05/24  2316 03/05/24  1207   POC GLUCOSE mg/dl 135 107 119 114 124 122 124 160*               Lines/Drains:  Invasive Devices       Peripheral Intravenous Line  Duration             Long-Dwell Peripheral IV (Midline) 03/06/24 Right Cephalic Vein 2 days              Drain  Duration             Closed/Suction Drain Medial Abdomen Bulb 10 Fr. 4  days    Closed/Suction Drain Right Abdomen 10 Fr. 4 days                          Imaging: No pertinent imaging reviewed.    Recent Cultures (last 7 days):         Last 24 Hours Medication List:   Current Facility-Administered Medications   Medication Dose Route Frequency Provider Last Rate    acetaminophen  650 mg Oral Q6H PRN Smith S Marisa, PA-C      docusate sodium  100 mg Oral BID Smith S Marisa, PA-C      ergocalciferol  50,000 Units Oral Weekly Freddy Lawler MD      famotidine  20 mg Intravenous Q12H RANDALL Smith S Marisa, PA-QUENTIN      heparin (porcine)  7,500 Units Subcutaneous Q8H RANDALL Smith S Marisa, PA-C      HYDROmorphone  1 mg Intravenous Q6H PRN Smith S Marisa, PA-QUENTIN      labetalol  10 mg Intravenous Q6H PRN Yusra NiWINTER      metoprolol tartrate  25 mg Oral Q12H [facility] Freddy Lawler MD      naloxone  0.04 mg Intravenous Q1MIN PRN Smith S Marisa, PA-QUENTIN      ondansetron  4 mg Intravenous Q6H PRN Smith S Marisa, PA-QUENTIN      oxyCODONE  10 mg Oral Q4H PRN Mario Alberto Holloway PA-C      oxyCODONE  5 mg Oral Q4H PRN Mario Alberto Holloway PA-C          Today, Patient Was Seen By: Freddy Lawler MD    **Please Note: This note may have been constructed using a voice recognition system.**

## 2024-03-09 NOTE — ASSESSMENT & PLAN NOTE
"/83   Pulse 100   Temp 97.8 °F (36.6 °C)   Resp 21   Ht 5' 4\" (1.626 m)   Wt (!) 138 kg (303 lb 5.7 oz)   SpO2 94%   BMI 52.07 kg/m²   Acceptable pressures  Started on Lopressor 25 mg twice daily, continue   Continue as needed labetalol for systolic blood pressure greater than 180    "

## 2024-03-09 NOTE — PLAN OF CARE
Problem: PAIN - ADULT  Goal: Verbalizes/displays adequate comfort level or baseline comfort level  Description: Interventions:  - Encourage patient to monitor pain and request assistance  - Assess pain using appropriate pain scale  - Administer analgesics based on type and severity of pain and evaluate response  - Implement non-pharmacological measures as appropriate and evaluate response  - Consider cultural and social influences on pain and pain management  - Notify physician/advanced practitioner if interventions unsuccessful or patient reports new pain  3/9/2024 1722 by Paget Barzey  Outcome: Progressing  3/9/2024 1721 by Paget Barzey  Outcome: Progressing

## 2024-03-11 ENCOUNTER — HOME CARE VISIT (OUTPATIENT)
Dept: HOME HEALTH SERVICES | Facility: HOME HEALTHCARE | Age: 69
End: 2024-03-11

## 2024-03-11 NOTE — CASE COMMUNICATION
Called and spoke with patient who reports she  is taking good care of her.  The drains are being emptied as instructed.  Did mention she was sent home with opiod medication which she does not need as she has little to no pain.  States the blood pressure medications were not ordered at discharge.  Has a follow-up appointment schedule for the end of the week.  Please d/c this referral as of this date.  Thank you.

## 2024-03-12 ENCOUNTER — OFFICE VISIT (OUTPATIENT)
Dept: FAMILY MEDICINE CLINIC | Facility: CLINIC | Age: 69
End: 2024-03-12
Payer: MEDICARE

## 2024-03-12 VITALS
BODY MASS INDEX: 50.02 KG/M2 | HEART RATE: 114 BPM | HEIGHT: 64 IN | WEIGHT: 293 LBS | OXYGEN SATURATION: 98 % | SYSTOLIC BLOOD PRESSURE: 120 MMHG | TEMPERATURE: 98.9 F | DIASTOLIC BLOOD PRESSURE: 70 MMHG | RESPIRATION RATE: 16 BRPM

## 2024-03-12 DIAGNOSIS — K56.609 SMALL BOWEL OBSTRUCTION (HCC): ICD-10-CM

## 2024-03-12 DIAGNOSIS — K56.609 SBO (SMALL BOWEL OBSTRUCTION) (HCC): ICD-10-CM

## 2024-03-12 DIAGNOSIS — Z12.31 SCREENING MAMMOGRAM FOR BREAST CANCER: ICD-10-CM

## 2024-03-12 DIAGNOSIS — Z09 HOSPITAL DISCHARGE FOLLOW-UP: ICD-10-CM

## 2024-03-12 DIAGNOSIS — R73.01 IMPAIRED FASTING BLOOD SUGAR: ICD-10-CM

## 2024-03-12 DIAGNOSIS — I10 HYPERTENSION, UNSPECIFIED TYPE: ICD-10-CM

## 2024-03-12 DIAGNOSIS — Z76.89 ESTABLISHING CARE WITH NEW DOCTOR, ENCOUNTER FOR: Primary | ICD-10-CM

## 2024-03-12 DIAGNOSIS — Z12.11 SCREENING FOR COLON CANCER: ICD-10-CM

## 2024-03-12 DIAGNOSIS — K43.0 INCARCERATED INCISIONAL HERNIA: ICD-10-CM

## 2024-03-12 DIAGNOSIS — E55.9 VITAMIN D DEFICIENCY: ICD-10-CM

## 2024-03-12 PROCEDURE — 99205 OFFICE O/P NEW HI 60 MIN: CPT | Performed by: NURSE PRACTITIONER

## 2024-03-12 RX ORDER — ERGOCALCIFEROL 1.25 MG/1
50000 CAPSULE ORAL WEEKLY
Qty: 12 CAPSULE | Refills: 0 | Status: SHIPPED | OUTPATIENT
Start: 2024-03-12 | End: 2024-05-29

## 2024-03-12 NOTE — ASSESSMENT & PLAN NOTE
Blood pressure managed in office today.  Discussed with patient importance of weight management, heart healthy diet, increase hydration.  To obtain blood work as ordered.  To continue on metoprolol as prescribed.

## 2024-03-12 NOTE — PROGRESS NOTES
Name: Ivonne Marcos      : 1955      MRN: [id number]  Encounter Provider: KATE Hernandez  Encounter Date: 3/12/2024   Encounter department: [facility] [address]    Assessment & Plan     1. Establishing care with new doctor, encounter for    2. Hospital discharge follow-up    3. Incarcerated incisional hernia  Assessment & Plan:  S/p Laproscopic open incisional hernia repair with mesh on 3/4.  Patient did not have follow-up with surgery scheduled upon discharge.  Patient scheduled during visit.  Discussed increased hydration, proper diet. To follow-up with surgery as scheduled.    Orders:  -     Ambulatory Referral to General Surgery; Future    4. Small bowel obstruction (HCC)  -     Ambulatory Referral to General Surgery; Future    5. SBO (small bowel obstruction) (HCC)    6. Hypertension, unspecified type  Assessment & Plan:  Blood pressure managed in office today.  Discussed with patient importance of weight management, heart healthy diet, increase hydration.  To obtain blood work as ordered.  To continue on metoprolol as prescribed.    Orders:  -     Ambulatory Referral to [facility]  -     TSH, 3rd generation with Free T4 reflex; Future  -     Hemoglobin A1C; Future  -     Comprehensive metabolic panel; Future  -     CBC and differential; Future  -     Lipid panel; Future  -     Albumin / creatinine urine ratio; Future  -     metoprolol tartrate (LOPRESSOR) 25 mg tablet; Take 1 tablet (25 mg total) by mouth every 12 (twelve) hours    7. Impaired fasting blood sugar  -     Hemoglobin A1C; Future  -     Comprehensive metabolic panel; Future    8. Screening mammogram for breast cancer  -     Mammo screening bilateral w 3d & cad; Future; Expected date: 2024    9. Screening for colon cancer  -     Occult Blood, Fecal Immunochemical; Future    10. Profound Vitamin D deficiency  -     ergocalciferol (VITAMIN D2) 50,000 units; Take 1 capsule (50,000  Units total) by mouth once a week for 12 doses           Subjective      Patient presents office for establishment of care.  As per patient, has not had primary care in several years.  Has never had mammo, colon cancer screening.  Patient denies significant medical history.  Notes surgical history of cholecystectomy.    Patient admitted to hospital 3/3 - 3/9 after experiencing worsening abdominal pain and decreased appetite.  Had open repair of incisional hernia with mesh placement and partial omentectomy on 3/4.  Patient was found to be hypertensive, was initiated metoprolol during visit.  Patient was discharged home, but has not had surgical follow-up as of yet.  Patient does not have home health at this time.  Her and her significant other have been managing wound drains.  Patient states she is voiding and moving her bowels normally.  Denies fever, chills, abdominal distention or pain, blood in stool, constipation, vomiting.  Notes swelling of left foot that began this morning.  Denies chest pain, SOB.        Review of Systems   Constitutional:  Negative for chills and fever.   HENT:  Negative for sore throat and trouble swallowing.    Eyes:  Negative for photophobia and visual disturbance.   Respiratory:  Negative for cough and shortness of breath.    Cardiovascular:  Positive for leg swelling. Negative for chest pain and palpitations.   Gastrointestinal:  Negative for abdominal distention, abdominal pain, constipation, nausea and vomiting.   Genitourinary:  Negative for decreased urine volume, dysuria, flank pain, frequency, hematuria, pelvic pain and urgency.   Musculoskeletal:  Negative for arthralgias, back pain and myalgias.   Skin:  Negative for color change and rash.   Neurological:  Negative for dizziness, syncope, weakness, light-headedness, numbness and headaches.   Psychiatric/Behavioral:  Negative for confusion.        Current Outpatient Medications on File Prior to Visit   Medication Sig     "[DISCONTINUED] ergocalciferol (VITAMIN D2) 50,000 units Take 1 capsule (50,000 Units total) by mouth once a week for 7 doses    [DISCONTINUED] metoprolol tartrate (LOPRESSOR) 25 mg tablet Take 1 tablet (25 mg total) by mouth every 12 (twelve) hours    [DISCONTINUED] oxyCODONE (ROXICODONE) 5 immediate release tablet Take 1 tablet (5 mg total) by mouth every 4 (four) hours as needed for moderate pain for up to 3 days Max Daily Amount: 30 mg (Patient not taking: Reported on 3/12/2024)       Objective     /70   Pulse (!) 114   Temp 98.9 °F (37.2 °C)   Resp 16   Ht 5' 4\" (1.626 m)   Wt 134 kg (294 lb 12.8 oz)   SpO2 98%   BMI 50.60 kg/m²     Physical Exam  Vitals reviewed.   Constitutional:       General: She is not in acute distress.     Appearance: She is obese. She is not ill-appearing.   HENT:      Head: Normocephalic and atraumatic.      Right Ear: Tympanic membrane, ear canal and external ear normal.      Left Ear: Tympanic membrane, ear canal and external ear normal.      Nose: Nose normal.      Mouth/Throat:      Mouth: Mucous membranes are moist.      Pharynx: Oropharynx is clear.   Eyes:      Conjunctiva/sclera: Conjunctivae normal.      Pupils: Pupils are equal, round, and reactive to light.   Neck:      Vascular: No carotid bruit.   Cardiovascular:      Rate and Rhythm: Regular rhythm. Tachycardia present.      Pulses: Normal pulses.      Heart sounds: Normal heart sounds. No murmur heard.  Pulmonary:      Effort: Pulmonary effort is normal.      Breath sounds: Normal breath sounds. No wheezing.   Abdominal:      General: Abdomen is protuberant. Bowel sounds are decreased. There is no distension.      Tenderness: There is no abdominal tenderness. There is no right CVA tenderness or left CVA tenderness.      Hernia: No hernia is present.      Comments: Surgical laparoscopic incisions noted on abdominal wall.  2 surgical drains noted to right mid/lower abdomen.  Draining serosanguineous fluid. "   Musculoskeletal:      Cervical back: Normal range of motion and neck supple.      Right lower le+ Edema present.      Left lower le+ Edema present.   Skin:     General: Skin is warm and dry.   Neurological:      Mental Status: She is alert.   Psychiatric:         Mood and Affect: Mood normal.         Behavior: Behavior normal.       KATE Hernandez

## 2024-03-12 NOTE — PATIENT INSTRUCTIONS
Protein (chicken, fish)  Eat or drink small amounts of liquid every 2 hours (stay away from carbonated beverages)  Well-cooked vegetables, fruit, and meat.  Avoid any food that is tough and stringy (celery and tough meat)      Heart Healthy Diet   AMBULATORY CARE:   A heart healthy diet  is an eating plan low in unhealthy fats and sodium (salt). The plan is high in healthy fats and fiber. A heart healthy diet helps improve your cholesterol levels and lowers your risk for heart disease and stroke. A dietitian will teach you how to read and understand food labels.  Heart healthy diet guidelines to follow:   Choose foods that contain healthy fats:      Unsaturated fats  include monounsaturated and polyunsaturated fats. Unsaturated fat is found in foods such as soybean, canola, olive, corn, and safflower oils. It is also found in soft tub margarine that is made with liquid vegetable oil.    Omega-3 fat  is found in certain fish, such as salmon, tuna, and trout, and in walnuts and flaxseed. Eat fish high in omega-3 fats at least 2 times a week.       Limit or do not have unhealthy fats:      Cholesterol  is found in animal foods, such as eggs and lobster, and in dairy products made from whole milk. Limit cholesterol to less than 200 mg each day.    Saturated fat  is found in meats, such as kee and hamburger. It is also found in chicken or turkey skin, whole milk, and butter. Limit saturated fat to less than 7% of your total daily calories.    Trans fat  is found in packaged foods, such as potato chips and cookies. It is also in hard margarine, some fried foods, and shortening. Do not eat foods that contain trans fats.    Get 20 to 30 grams of fiber each day.  Fruits, vegetables, whole-grain foods, and legumes (cooked beans) are good sources of fiber.         Limit sodium as directed.  You may be told to limit sodium, such as to 2,000 mg or less each day. Choose low-sodium or no-salt-added foods. Add little or no salt  to food you prepare. Use herbs and spices in place of salt.       Include the following in your heart healthy plan:  Ask your dietitian or healthcare provider how many servings to have each day from the following food groups:  Grains:      Whole-wheat breads, cereals, and pastas, and brown rice    Low-fat, low-sodium crackers and chips    Vegetables:      Broccoli, green beans, green peas, and spinach    Collards, kale, and lima beans    Carrots, sweet potatoes, tomatoes, and peppers    Canned vegetables with no salt added    Fruits:      Bananas, peaches, pears, and pineapple    Grapes, raisins, and dates    Oranges, tangerines, grapefruit, orange juice, and grapefruit juice    Apricots, mangoes, melons, and papaya    Raspberries and strawberries    Canned fruit with no added sugar    Low-fat dairy:      Nonfat (skim) milk, 1% milk, and low-fat almond, cashew, or soy milks fortified with calcium    Low-fat cheese, regular or frozen yogurt, and cottage cheese    Meats and proteins:      Lean cuts of beef and pork (loin, leg, round), skinless chicken and turkey    Legumes, soy products, egg whites, or nuts    Limit or do not include the following in your heart healthy plan:   Foods and liquids that contain unhealthy fats and oils:      Whole or 2% milk, cream cheese, sour cream, or cheese    High-fat cuts of beef (T-bone steaks, ribs), chicken or turkey with skin, and organ meats such as liver    Butter, stick margarine, shortening, and cooking oils such as coconut or palm oil    Foods and liquids high in sodium:      Packaged foods, such as frozen dinners, cookies, macaroni and cheese, and cereals with more than 300 mg of sodium per serving    Vegetables with added sodium, such as instant potatoes, vegetables with added sauces, or regular canned vegetables    Cured or smoked meats, such as hot dogs, kee, and sausage    High-sodium ketchup, barbecue sauce, salad dressing, pickles, olives, soy sauce, or  miso    Foods and liquids high in sugar:      Candy, cake, cookies, pies, or doughnuts    Soft drinks (soda), sports drinks, or sweetened tea    Canned or dry mixes for cakes, soups, sauces, or gravies    Other healthy heart guidelines:   Do not smoke.  Nicotine and other chemicals in cigarettes and cigars can cause lung and heart damage. Ask your healthcare provider for information if you currently smoke and need help to quit. E-cigarettes or smokeless tobacco still contain nicotine. Talk to your provider before you use these products.    Limit or do not drink alcohol as directed.  Alcohol can damage your heart and raise your blood pressure. Your healthcare provider may give you specific daily and weekly limits. The general recommended limit is 1 drink a day for women 21 or older and for men 65 or older. Do not have more than 3 drinks within 24 hours or 7 within a week. The recommended limit is 2 drinks a day for men 21 to 64 years of age. Do not have more than 4 drinks within 24 hours or 14 within a week. A drink of alcohol is 12 ounces of beer, 5 ounces of wine, or 1½ ounces of liquor.    Maintain a healthy weight.  Extra body weight makes your heart work harder. Ask your provider what a healthy weight is for you. He or she can help you create a safe weight loss plan, if needed.    Exercise regularly.  Exercise can help you maintain a healthy weight and improve your blood pressure and cholesterol levels. Regular exercise can also decrease your risk for heart problems. Ask your provider about the best exercise plan for you. Do not start an exercise program without asking your provider.          Follow up with your doctor or cardiologist as directed:  Write down your questions so you remember to ask them during your visits.  © Copyright [employer] 2023 Information is for End User's use only and may not be sold, redistributed or otherwise used for commercial purposes.  The above information is an  only.  It is not intended as medical advice for individual conditions or treatments. Talk to your doctor, nurse or pharmacist before following any medical regimen to see if it is safe and effective for you.

## 2024-03-12 NOTE — ASSESSMENT & PLAN NOTE
S/p Laproscopic open incisional hernia repair with mesh on 3/4.  Patient did not have follow-up with surgery scheduled upon discharge.  Patient scheduled during visit.  Discussed increased hydration, proper diet. To follow-up with surgery as scheduled.

## 2024-03-14 ENCOUNTER — OFFICE VISIT (OUTPATIENT)
Dept: SURGERY | Facility: CLINIC | Age: 69
End: 2024-03-14
Payer: MEDICARE

## 2024-03-14 VITALS
DIASTOLIC BLOOD PRESSURE: 72 MMHG | TEMPERATURE: 97.5 F | SYSTOLIC BLOOD PRESSURE: 128 MMHG | WEIGHT: 291.8 LBS | HEART RATE: 68 BPM | BODY MASS INDEX: 49.82 KG/M2 | OXYGEN SATURATION: 100 % | HEIGHT: 64 IN | RESPIRATION RATE: 16 BRPM

## 2024-03-14 DIAGNOSIS — Z48.89 POSTOPERATIVE VISIT: Primary | ICD-10-CM

## 2024-03-14 DIAGNOSIS — K56.609 SMALL BOWEL OBSTRUCTION (HCC): ICD-10-CM

## 2024-03-14 DIAGNOSIS — K43.0 INCARCERATED INCISIONAL HERNIA: ICD-10-CM

## 2024-03-14 PROCEDURE — 99213 OFFICE O/P EST LOW 20 MIN: CPT | Performed by: SURGERY

## 2024-03-14 NOTE — PROGRESS NOTES
Follow Up - General Surgery   Ivonne Marcos 68 y.o. female MRN: [id number]  Unit/Bed#:  Encounter: [id number]    Assessment/Plan     Assessment:  Status post open repair of incisional hernia with mesh, improving  Plan:  Patient is doing quite well from the surgical standpoint, she has 2 drains but she is not recording the output.  Patient was given specific instructions how to keep track of the output.  The patient will return to my office next week for follow-up.    History of Present Illness     HPI:  Ivonne Marcos is a 68 y.o. female who presents to my office for first postop follow-up after incisional hernia repair with mesh from .  She denies having any abdominal pain, fever, chills, nausea, vomiting, diarrhea, constipation or any other constitutional symptoms.  She has not Recorded the output of the 2 drains.    Review of Systems  The rest of the review of system total of 10 were negative except for the HPI.    Historical Information   History reviewed. No pertinent past medical history.  Past Surgical History:   Procedure Laterality Date    CHOLECYSTECTOMY      HERNIA REPAIR Right 2024    Procedure: Laproscopic Open incisional hernia repair with mesh, Extenisve lysis of adhesions, Partial omentectomy;  Surgeon: Jesus Gallo MD;  Location: [facility];  Service: General    WISDOM TOOTH EXTRACTION       Social History   Social History     Substance and Sexual Activity   Alcohol Use Not Currently     Social History     Substance and Sexual Activity   Drug Use Never     Social History     Tobacco Use   Smoking Status Former    Types: Cigarettes    Start date: 1992    Quit date: 1972    Years since quittin.2    Passive exposure: Past   Smokeless Tobacco Never     Family History: non-contributory    Meds/Allergies   all medications and allergies reviewed     Current Outpatient Medications:     ergocalciferol (VITAMIN D2) 50,000 units, Take 1 capsule (50,000 Units total) by mouth  "once a week for 12 doses, Disp: 12 capsule, Rfl: 0    metoprolol tartrate (LOPRESSOR) 25 mg tablet, Take 1 tablet (25 mg total) by mouth every 12 (twelve) hours, Disp: 60 tablet, Rfl: 3  No Known Allergies    Objective     Current Vitals:   Blood Pressure: 128/72 (03/14/24 1001)  Pulse: 68 (03/14/24 1001)  Temperature: 97.5 °F (36.4 °C) (03/14/24 1001)  Respirations: 16 (03/14/24 1001)  Height: 5' 4\" (162.6 cm) (03/14/24 1001)  Weight - Scale: 132 kg (291 lb 12.8 oz) (03/14/24 1001)  SpO2: 100 % (03/14/24 1001)      Invasive Devices       Drain  Duration             Closed/Suction Drain Medial Abdomen Bulb 10 Fr. 9 days    Closed/Suction Drain Right Abdomen 10 Fr. 9 days                    Physical Exam  Vitals and nursing note reviewed.   Constitutional:       General: She is not in acute distress.     Appearance: She is obese.   Cardiovascular:      Rate and Rhythm: Normal rate and regular rhythm.   Pulmonary:      Effort: No respiratory distress.      Breath sounds: Normal breath sounds.   Abdominal:      Comments: Abdomen is obese, soft, nondistended and nontender.  Midline incision is healing well without evidence of infection.  KWESI drains in place, serosanguineous drainage.   Skin:     General: Skin is warm.      Coloration: Skin is not jaundiced.      Findings: No erythema or rash.   Neurological:      Mental Status: She is alert and oriented to person, place, and time.      Cranial Nerves: No cranial nerve deficit.   Psychiatric:         Mood and Affect: Mood normal.         Behavior: Behavior normal.       "

## 2024-03-20 ENCOUNTER — OFFICE VISIT (OUTPATIENT)
Dept: URGENT CARE | Facility: CLINIC | Age: 69
End: 2024-03-20
Payer: MEDICARE

## 2024-03-20 ENCOUNTER — TELEPHONE (OUTPATIENT)
Dept: FAMILY MEDICINE CLINIC | Facility: CLINIC | Age: 69
End: 2024-03-20

## 2024-03-20 ENCOUNTER — OFFICE VISIT (OUTPATIENT)
Dept: SURGERY | Facility: CLINIC | Age: 69
End: 2024-03-20

## 2024-03-20 VITALS
RESPIRATION RATE: 20 BRPM | HEART RATE: 67 BPM | DIASTOLIC BLOOD PRESSURE: 68 MMHG | TEMPERATURE: 99 F | OXYGEN SATURATION: 97 % | SYSTOLIC BLOOD PRESSURE: 128 MMHG

## 2024-03-20 VITALS
SYSTOLIC BLOOD PRESSURE: 130 MMHG | TEMPERATURE: 97.9 F | HEIGHT: 64 IN | BODY MASS INDEX: 48.45 KG/M2 | OXYGEN SATURATION: 97 % | DIASTOLIC BLOOD PRESSURE: 68 MMHG | WEIGHT: 283.8 LBS | RESPIRATION RATE: 16 BRPM | HEART RATE: 97 BPM

## 2024-03-20 DIAGNOSIS — N39.0 URINARY TRACT INFECTION WITHOUT HEMATURIA, SITE UNSPECIFIED: Primary | ICD-10-CM

## 2024-03-20 DIAGNOSIS — Z48.89 POSTOPERATIVE VISIT: Primary | ICD-10-CM

## 2024-03-20 DIAGNOSIS — R30.0 DYSURIA: ICD-10-CM

## 2024-03-20 LAB
SL AMB  POCT GLUCOSE, UA: NEGATIVE
SL AMB LEUKOCYTE ESTERASE,UA: ABNORMAL
SL AMB POCT BILIRUBIN,UA: NEGATIVE
SL AMB POCT BLOOD,UA: ABNORMAL
SL AMB POCT CLARITY,UA: ABNORMAL
SL AMB POCT COLOR,UA: YELLOW
SL AMB POCT KETONES,UA: NEGATIVE
SL AMB POCT NITRITE,UA: NEGATIVE
SL AMB POCT PH,UA: 6.5
SL AMB POCT SPECIFIC GRAVITY,UA: 1.02
SL AMB POCT URINE PROTEIN: NEGATIVE
SL AMB POCT UROBILINOGEN: 0.4

## 2024-03-20 PROCEDURE — G0463 HOSPITAL OUTPT CLINIC VISIT: HCPCS

## 2024-03-20 PROCEDURE — 99024 POSTOP FOLLOW-UP VISIT: CPT | Performed by: SURGERY

## 2024-03-20 PROCEDURE — 81002 URINALYSIS NONAUTO W/O SCOPE: CPT

## 2024-03-20 PROCEDURE — 87086 URINE CULTURE/COLONY COUNT: CPT

## 2024-03-20 PROCEDURE — 99213 OFFICE O/P EST LOW 20 MIN: CPT

## 2024-03-20 RX ORDER — CEPHALEXIN 500 MG/1
500 CAPSULE ORAL EVERY 12 HOURS SCHEDULED
Qty: 14 CAPSULE | Refills: 0 | Status: SHIPPED | OUTPATIENT
Start: 2024-03-20 | End: 2024-03-27

## 2024-03-20 NOTE — PROGRESS NOTES
[facility]        NAME: Ivonne Marcos is a 68 y.o. female  : 1955    MRN: [id number]  DATE: 2024  TIME: 2:42 PM    Assessment and Plan   Urinary tract infection without hematuria, site unspecified [N39.0]  1. Urinary tract infection without hematuria, site unspecified  Urine culture    cephalexin (KEFLEX) 500 mg capsule      2. Dysuria  POCT urine dip            Patient Instructions     Check my chart for urine culture results.     Start antibiotic and take as directed. Take probiotic.    Drink plenty of fluids, water or cranberry juice.   Avoid caffeine and alcohol. Tylenol or Motrin for pain or fever.    Azo for bladder spasms.      Follow up with PCP in 3-5 days.   If you develop fever, flank pain, vomiting, abdominal pain, or any new or concerning symptoms please return or proceed to ER.     Chief Complaint     Chief Complaint   Patient presents with    Urinary Tract Infection     Dysuria x 3 days . Denies fever, chills, or lower back pain         History of Present Illness       The patient presents today with complaints of dysuria x 3 days. Denies fever/chills, flank/abdominal pain, n/v/d, frequency. She recently had hernia repair surgery on 3/4/24. She saw her surgeon today and was told everything is healing as expected.         Review of Systems   Review of Systems   Constitutional:  Negative for chills and fever.   Gastrointestinal:  Negative for abdominal pain, diarrhea, nausea and vomiting.   Genitourinary:  Positive for dysuria. Negative for difficulty urinating, flank pain, frequency, hematuria and urgency.   Musculoskeletal:  Negative for myalgias.         Current Medications       Current Outpatient Medications:     cephalexin (KEFLEX) 500 mg capsule, Take 1 capsule (500 mg total) by mouth every 12 (twelve) hours for 7 days, Disp: 14 capsule, Rfl: 0    ergocalciferol (VITAMIN D2) 50,000 units, Take 1 capsule (50,000 Units total) by mouth once a week for 12 doses,  Disp: 12 capsule, Rfl: 0    metoprolol tartrate (LOPRESSOR) 25 mg tablet, Take 1 tablet (25 mg total) by mouth every 12 (twelve) hours, Disp: 60 tablet, Rfl: 3    Current Allergies     Allergies as of 03/20/2024    (No Known Allergies)            The following portions of the patient's history were reviewed and updated as appropriate: allergies, current medications, past family history, past medical history, past social history, past surgical history and problem list.     History reviewed. No pertinent past medical history.    Past Surgical History:   Procedure Laterality Date    CHOLECYSTECTOMY      HERNIA REPAIR Right 03/04/2024    Procedure: Laproscopic Open incisional hernia repair with mesh, Extenisve lysis of adhesions, Partial omentectomy;  Surgeon: Jesus aGllo MD;  Location: [facility] OR;  Service: General    WISDOM TOOTH EXTRACTION         Family History   Problem Relation Age of Onset    No Known Problems Mother     No Known Problems Father          Medications have been verified.        Objective   /68   Pulse 67   Temp 99 °F (37.2 °C)   Resp 20   SpO2 97%        Physical Exam     Physical Exam  Vitals and nursing note reviewed.   Constitutional:       General: She is not in acute distress.     Appearance: Normal appearance. She is not ill-appearing.   HENT:      Head: Normocephalic and atraumatic.      Right Ear: External ear normal.      Left Ear: External ear normal.      Nose: Nose normal.      Mouth/Throat:      Lips: Pink.      Mouth: Mucous membranes are moist.   Eyes:      General: Vision grossly intact.      Extraocular Movements: Extraocular movements intact.      Pupils: Pupils are equal, round, and reactive to light.   Cardiovascular:      Rate and Rhythm: Normal rate and regular rhythm.      Heart sounds: Normal heart sounds. No murmur heard.  Pulmonary:      Effort: Pulmonary effort is normal. No respiratory distress.      Breath sounds: Normal breath sounds. No decreased air  movement. No decreased breath sounds, wheezing, rhonchi or rales.   Abdominal:      Tenderness: There is no right CVA tenderness or left CVA tenderness.   Musculoskeletal:         General: Normal range of motion.      Cervical back: Normal range of motion.   Skin:     General: Skin is warm.      Findings: No rash.   Neurological:      Mental Status: She is alert and oriented to person, place, and time.      Motor: Motor function is intact.      Gait: Gait is intact.   Psychiatric:         Attention and Perception: Attention normal.         Mood and Affect: Mood normal.

## 2024-03-20 NOTE — TELEPHONE ENCOUNTER
Patient is not sure if she had a UTI. Symptoms are irritation and odor. Should she make an appt or come in on the nursing schedule for a urine sample.

## 2024-03-20 NOTE — PATIENT INSTRUCTIONS
Check my chart for urine culture results.     Start antibiotic and take as directed. Take probiotic.    Drink plenty of fluids, water or cranberry juice.   Avoid caffeine and alcohol. Tylenol or Motrin for pain or fever.    Azo for bladder spasms.      Follow up with PCP in 3-5 days.   If you develop fever, flank pain, vomiting, abdominal pain, or any new or concerning symptoms please return or proceed to ER.     Urinary Tract Infection in Women   WHAT YOU NEED TO KNOW:   A urinary tract infection (UTI) is caused by bacteria that get inside your urinary tract. Most bacteria that enter your urinary tract come out when you urinate. If the bacteria stay in your urinary tract, you may get an infection. Your urinary tract includes your kidneys, ureters, bladder, and urethra. Urine is made in your kidneys, and it flows from the ureters to the bladder. Urine leaves the bladder through the urethra. A UTI is more common in your lower urinary tract, which includes your bladder and urethra.        DISCHARGE INSTRUCTIONS:   Return to the emergency department if:   You are urinating very little or not at all.    You have a high fever with shaking chills.     You have side or back pain that gets worse.  Contact your healthcare provider if:   You have a fever.    You do not feel better after 2 days of taking antibiotics.    You are vomiting.     You have questions or concerns about your condition or care.  Medicines:   Antibiotics  help fight a bacterial infection.     Medicines  may be given to decrease pain and burning when you urinate. They will also help decrease the feeling that you need to urinate often. These medicines will make your urine orange or red.    Take your medicine as directed.  Contact your healthcare provider if you think your medicine is not helping or if you have side effects. Tell him or her if you are allergic to any medicine. Keep a list of the medicines, vitamins, and herbs you take. Include the amounts,  and when and why you take them. Bring the list or the pill bottles to follow-up visits. Carry your medicine list with you in case of an emergency.  Follow up with your healthcare provider as directed:  Write down your questions so you remember to ask them during your visits.   Prevent another UTI:   Empty your bladder often.  Urinate and empty your bladder as soon as you feel the need. Do not hold your urine for long periods of time.    Wipe from front to back after you urinate or have a bowel movement.  This will help prevent germs from getting into your urinary tract through your urethra.    Drink liquids as directed.  Ask how much liquid to drink each day and which liquids are best for you. You may need to drink more liquids than usual to help flush out the bacteria. Do not drink alcohol, caffeine, or citrus juices. These can irritate your bladder and increase your symptoms. Your healthcare provider may recommend cranberry juice to help prevent a UTI.    Urinate after you have sex.  This can help flush out bacteria passed during sex.    Do not douche or use feminine deodorants.  These can change the chemical balance in your vagina.    Change sanitary pads or tampons often.  This will help prevent germs from getting into your urinary tract.     Do pelvic muscle exercises often.  Pelvic muscle exercises may help you start and stop urinating. Strong pelvic muscles may help you empty your bladder easier. Squeeze these muscles tightly for 5 seconds like you are trying to hold back urine. Then relax for 5 seconds. Gradually work up to squeezing for 10 seconds. Do 3 sets of 15 repetitions a day, or as directed.  © 2017 [employer] Information is for End User's use only and may not be sold, redistributed or otherwise used for commercial purposes. All illustrations and images included in CareNotes® are the copyrighted property of [employer] or "[employer].  The above information is an   only. It is not intended as medical advice for individual conditions or treatments. Talk to your doctor, nurse or pharmacist before following any medical regimen to see if it is safe and effective for you.

## 2024-03-20 NOTE — PROGRESS NOTES
Post-Op Follow Up- General Surgery   Ivonne Marcos 68 y.o. female MRN: [id number]  Unit/Bed#:  Encounter: [id number]    Assessment/Plan     Assessment:  Status post open incisional hernia repair with mesh, improving  Plan:  Patient is doing quite well from the surgical standpoint.  She will return to my office in 2 to 3 weeks for routine follow-up.    History of Present Illness     HPI:  Ivonne Marcos is a 68 y.o. female who presents to my office for second postop follow-up after open repair of incisional hernia with mesh from .  Patient complains of difficulty urinating with burning sensation.  She denies having any nausea, vomiting, diarrhea, constipation or any other constitutional symptoms.  She had minimal drainage from the KWESI drains.    Historical Information   History reviewed. No pertinent past medical history.  Past Surgical History:   Procedure Laterality Date    CHOLECYSTECTOMY      HERNIA REPAIR Right 2024    Procedure: Laproscopic Open incisional hernia repair with mesh, Extenisve lysis of adhesions, Partial omentectomy;  Surgeon: Jesus Gallo MD;  Location: [facility];  Service: General    WISDOM TOOTH EXTRACTION       Social History   Social History     Substance and Sexual Activity   Alcohol Use Not Currently     Social History     Substance and Sexual Activity   Drug Use Never     Social History     Tobacco Use   Smoking Status Former    Types: Cigarettes    Start date: 1992    Quit date: 1972    Years since quittin.2    Passive exposure: Past   Smokeless Tobacco Never     Family History: non-contributory    Meds/Allergies   all medications and allergies reviewed     Current Outpatient Medications:     ergocalciferol (VITAMIN D2) 50,000 units, Take 1 capsule (50,000 Units total) by mouth once a week for 12 doses, Disp: 12 capsule, Rfl: 0    metoprolol tartrate (LOPRESSOR) 25 mg tablet, Take 1 tablet (25 mg total) by mouth every 12 (twelve) hours, Disp: 60 tablet,  "Rfl: 3  No Known Allergies    Objective     Current Vitals:   Blood Pressure: 130/68 (03/20/24 1256)  Pulse: 97 (03/20/24 1256)  Temperature: 97.9 °F (36.6 °C) (03/20/24 1256)  Respirations: 16 (03/20/24 1256)  Height: 5' 4\" (162.6 cm) (03/20/24 1256)  Weight - Scale: 129 kg (283 lb 12.8 oz) (03/20/24 1256)  SpO2: 97 % (03/20/24 1256)    Physical Exam  Vitals and nursing note reviewed.   Abdominal:      Comments: Abdomen is obese, soft, nondistended and nontender.  Midline incision is healing well without evidence of infection.  KWESI drains were removed in the office without any issues.  The patient will return to my office in 2/3 weeks to remove the staples from the midline incision.       "

## 2024-03-21 LAB — BACTERIA UR CULT: NORMAL

## 2024-04-10 ENCOUNTER — OFFICE VISIT (OUTPATIENT)
Dept: SURGERY | Facility: CLINIC | Age: 69
End: 2024-04-10

## 2024-04-10 VITALS
HEIGHT: 64 IN | SYSTOLIC BLOOD PRESSURE: 128 MMHG | TEMPERATURE: 97.9 F | OXYGEN SATURATION: 99 % | HEART RATE: 76 BPM | DIASTOLIC BLOOD PRESSURE: 78 MMHG | BODY MASS INDEX: 48.18 KG/M2 | RESPIRATION RATE: 18 BRPM | WEIGHT: 282.2 LBS

## 2024-04-10 DIAGNOSIS — Z48.89 POSTOPERATIVE VISIT: ICD-10-CM

## 2024-04-10 DIAGNOSIS — K30 DELAYED GASTRIC EMPTYING: Primary | ICD-10-CM

## 2024-04-10 PROCEDURE — 99024 POSTOP FOLLOW-UP VISIT: CPT | Performed by: SURGERY

## 2024-04-10 RX ORDER — ERYTHROMYCIN 250 MG/1
250 TABLET, DELAYED RELEASE ORAL 2 TIMES DAILY
Qty: 20 TABLET | Refills: 1 | Status: SHIPPED | OUTPATIENT
Start: 2024-04-10 | End: 2024-04-10 | Stop reason: CLARIF

## 2024-04-10 NOTE — PROGRESS NOTES
Post-Op Follow Up- General Surgery   Ivonne Marcos 68 y.o. female MRN: [id number]  Unit/Bed#:  Encounter: [id number]    Assessment/Plan     Assessment:  Status post incisional hernia repair with mesh, improved  Plan:  Patient is doing quite well from a surgical standpoint.  No evidence of recurrence of the hernia at this time.  The patient is discharged from my care and I will be glad to see her if any problem arises in the future.    History of Present Illness     HPI:  Ivonne Marcos is a 68 y.o. female who presents to my office for second postop follow-up after open incisional hernia repair with mesh.  Patient offers no complaints at this time.    Historical Information   History reviewed. No pertinent past medical history.  Past Surgical History:   Procedure Laterality Date    CHOLECYSTECTOMY      HERNIA REPAIR Right 2024    Procedure: Laproscopic Open incisional hernia repair with mesh, Extenisve lysis of adhesions, Partial omentectomy;  Surgeon: Jesus Gallo MD;  Location: [facility] [place] [facility];  Service: General    WISDOM TOOTH EXTRACTION       Social History   Social History     Substance and Sexual Activity   Alcohol Use Not Currently     Social History     Substance and Sexual Activity   Drug Use Never     Social History     Tobacco Use   Smoking Status Former    Types: Cigarettes    Start date: 1992    Quit date: 1972    Years since quittin.2    Passive exposure: Past   Smokeless Tobacco Never     Family History: non-contributory    Meds/Allergies   all medications and allergies reviewed     Current Outpatient Medications:     ergocalciferol (VITAMIN D2) 50,000 units, Take 1 capsule (50,000 Units total) by mouth once a week for 12 doses, Disp: 12 capsule, Rfl: 0    metoprolol tartrate (LOPRESSOR) 25 mg tablet, Take 1 tablet (25 mg total) by mouth every 12 (twelve) hours, Disp: 60 tablet, Rfl: 3  No Known Allergies    Objective     Current Vitals:   Blood Pressure: 128/78 (04/10/24  "1243)  Pulse: 76 (04/10/24 1243)  Temperature: 97.9 °F (36.6 °C) (04/10/24 1243)  Temp Source: Temporal (04/10/24 1243)  Respirations: 18 (04/10/24 1243)  Height: 5' 4\" (162.6 cm) (04/10/24 1243)  Weight - Scale: 128 kg (282 lb 3.2 oz) (04/10/24 1243)  SpO2: 99 % (04/10/24 1243)    Physical Exam  Vitals and nursing note reviewed.   Abdominal:      Comments: Abdomen is obese, soft, nondistended and nontender.  Midline incision is well-healed, staples and sutures were removed in the office.  No evidence of seroma at this time.       "

## 2024-04-12 ENCOUNTER — OFFICE VISIT (OUTPATIENT)
Dept: FAMILY MEDICINE CLINIC | Facility: CLINIC | Age: 69
End: 2024-04-12

## 2024-04-12 VITALS
TEMPERATURE: 98 F | OXYGEN SATURATION: 97 % | DIASTOLIC BLOOD PRESSURE: 74 MMHG | SYSTOLIC BLOOD PRESSURE: 126 MMHG | BODY MASS INDEX: 48.25 KG/M2 | HEART RATE: 83 BPM | WEIGHT: 282.6 LBS | HEIGHT: 64 IN

## 2024-04-12 DIAGNOSIS — Z78.0 ASYMPTOMATIC MENOPAUSAL STATE: ICD-10-CM

## 2024-04-12 DIAGNOSIS — I10 PRIMARY HYPERTENSION: ICD-10-CM

## 2024-04-12 DIAGNOSIS — Z00.00 ENCOUNTER FOR ANNUAL WELLNESS VISIT (AWV) IN MEDICARE PATIENT: Primary | ICD-10-CM

## 2024-04-12 DIAGNOSIS — R73.03 PREDIABETES: ICD-10-CM

## 2024-04-12 DIAGNOSIS — E55.9 VITAMIN D DEFICIENCY: ICD-10-CM

## 2024-04-12 RX ORDER — CHOLECALCIFEROL (VITAMIN D3) 50 MCG
2000 TABLET ORAL DAILY
Qty: 90 TABLET | Refills: 1 | Status: SHIPPED | OUTPATIENT
Start: 2024-04-12

## 2024-04-12 NOTE — PATIENT INSTRUCTIONS
Medicare Preventive Visit Patient Instructions  Thank you for completing your Welcome to Medicare Visit or Medicare Annual Wellness Visit today. Your next wellness visit will be due in one year (4/13/2025).  The screening/preventive services that you may require over the next 5-10 years are detailed below. Some tests may not apply to you based off risk factors and/or age. Screening tests ordered at today's visit but not completed yet may show as past due. Also, please note that scanned in results may not display below.  Preventive Screenings:  Service Recommendations Previous Testing/Comments   Colorectal Cancer Screening  * Colonoscopy    * Fecal Occult Blood Test (FOBT)/Fecal Immunochemical Test (FIT)  * Fecal DNA/Cologuard Test  * Flexible Sigmoidoscopy Age: 45-75 years old   Colonoscopy: every 10 years (may be performed more frequently if at higher risk)  OR  FOBT/FIT: every 1 year  OR  Cologuard: every 3 years  OR  Sigmoidoscopy: every 5 years  Screening may be recommended earlier than age 45 if at higher risk for colorectal cancer. Also, an individualized decision between you and your healthcare provider will decide whether screening between the ages of 76-85 would be appropriate. Colonoscopy: Not on file  FOBT/FIT: Not on file  Cologuard: Not on file  Sigmoidoscopy: Not on file    Due for FOBT/FIT     Breast Cancer Screening Age: 40+ years old  Frequency: every 1-2 years  Not required if history of left and right mastectomy Mammogram: Not on file    Due for Mammogram   Cervical Cancer Screening Between the ages of 21-29, pap smear recommended once every 3 years.   Between the ages of 30-65, can perform pap smear with HPV co-testing every 5 years.   Recommendations may differ for women with a history of total hysterectomy, cervical cancer, or abnormal pap smears in past. Pap Smear: Not on file    Screening Not Indicated   Hepatitis C Screening Once for adults born between 1945 and 1965  More frequently in  patients at high risk for Hepatitis C Hep C Antibody: Not on file    Patient Declines   Diabetes Screening 1-2 times per year if you're at risk for diabetes or have pre-diabetes Fasting glucose: No results in last 5 years (No results in last 5 years)  A1C: 5.7 % (2/27/2024)  Screening Current   Cholesterol Screening Once every 5 years if you don't have a lipid disorder. May order more often based on risk factors. Lipid panel: Not on file    Due for Lipid Panel     Other Preventive Screenings Covered by Medicare:  Abdominal Aortic Aneurysm (AAA) Screening: covered once if your at risk. You're considered to be at risk if you have a family history of AAA.  Lung Cancer Screening: covers low dose CT scan once per year if you meet all of the following conditions: (1) Age 55-77; (2) No signs or symptoms of lung cancer; (3) Current smoker or have quit smoking within the last 15 years; (4) You have a tobacco smoking history of at least 20 pack years (packs per day multiplied by number of years you smoked); (5) You get a written order from a healthcare provider.  Glaucoma Screening: covered annually if you're considered high risk: (1) You have diabetes OR (2) Family history of glaucoma OR (3)  aged 50 and older OR (4)  American aged 65 and older  Osteoporosis Screening: covered every 2 years if you meet one of the following conditions: (1) You're estrogen deficient and at risk for osteoporosis based off medical history and other findings; (2) Have a vertebral abnormality; (3) On glucocorticoid therapy for more than 3 months; (4) Have primary hyperparathyroidism; (5) On osteoporosis medications and need to assess response to drug therapy.   Last bone density test (DXA Scan): Not on file.  HIV Screening: covered annually if you're between the age of 15-65. Also covered annually if you are younger than 15 and older than 65 with risk factors for HIV infection. For pregnant patients, it is covered up to 3  times per pregnancy.    Immunizations:  Immunization Recommendations   Influenza Vaccine Annual influenza vaccination during flu season is recommended for all persons aged >= 6 months who do not have contraindications   Pneumococcal Vaccine   * Pneumococcal conjugate vaccine = PCV13 (Prevnar 13), PCV15 (Vaxneuvance), PCV20 (Prevnar 20)  * Pneumococcal polysaccharide vaccine = PPSV23 (Pneumovax) Adults 19-65 yo with certain risk factors or if 65+ yo  If never received any pneumonia vaccine: recommend Prevnar 20 (PCV20)  Give PCV20 if previously received 1 dose of PCV13 or PPSV23   Hepatitis B Vaccine 3 dose series if at intermediate or high risk (ex: diabetes, end stage renal disease, liver disease)   Respiratory syncytial virus (RSV) Vaccine - COVERED BY MEDICARE PART D  * RSVPreF3 (Arexvy) CDC recommends that adults 60 years of age and older may receive a single dose of RSV vaccine using shared clinical decision-making (SCDM)   Tetanus (Td) Vaccine - COST NOT COVERED BY MEDICARE PART B Following completion of primary series, a booster dose should be given every 10 years to maintain immunity against tetanus. Td may also be given as tetanus wound prophylaxis.   Tdap Vaccine - COST NOT COVERED BY MEDICARE PART B Recommended at least once for all adults. For pregnant patients, recommended with each pregnancy.   Shingles Vaccine (Shingrix) - COST NOT COVERED BY MEDICARE PART B  2 shot series recommended in those 19 years and older who have or will have weakened immune systems or those 50 years and older     Health Maintenance Due:      Topic Date Due   • Hepatitis C Screening  Never done   • Breast Cancer Screening: Mammogram  Never done   • Colorectal Cancer Screening  Never done     Immunizations Due:      Topic Date Due   • Pneumococcal Vaccine: 65+ Years (1 of 1 - PCV) Never done   • Influenza Vaccine (1) Never done   • COVID-19 Vaccine (1 - 2023-24 season) Never done     Advance Directives   What are advance  directives?  Advance directives are legal documents that state your wishes and plans for medical care. These plans are made ahead of time in case you lose your ability to make decisions for yourself. Advance directives can apply to any medical decision, such as the treatments you want, and if you want to donate organs.   What are the types of advance directives?  There are many types of advance directives, and each state has rules about how to use them. You may choose a combination of any of the following:  Living will:  This is a written record of the treatment you want. You can also choose which treatments you do not want, which to limit, and which to stop at a certain time. This includes surgery, medicine, IV fluid, and tube feedings.   Durable power of  for healthcare (DPAHC):  This is a written record that states who you want to make healthcare choices for you when you are unable to make them for yourself. This person, called a proxy, is usually a family member or a friend. You may choose more than 1 proxy.  Do not resuscitate (DNR) order:  A DNR order is used in case your heart stops beating or you stop breathing. It is a request not to have certain forms of treatment, such as CPR. A DNR order may be included in other types of advance directives.  Medical directive:  This covers the care that you want if you are in a coma, near death, or unable to make decisions for yourself. You can list the treatments you want for each condition. Treatment may include pain medicine, surgery, blood transfusions, dialysis, IV or tube feedings, and a ventilator (breathing machine).  Values history:  This document has questions about your views, beliefs, and how you feel and think about life. This information can help others choose the care that you would choose.  Why are advance directives important?  An advance directive helps you control your care. Although spoken wishes may be used, it is better to have your wishes  written down. Spoken wishes can be misunderstood, or not followed. Treatments may be given even if you do not want them. An advance directive may make it easier for your family to make difficult choices about your care.   Weight Management   Why it is important to manage your weight:  Being overweight increases your risk of health conditions such as heart disease, high blood pressure, type 2 diabetes, and certain types of cancer. It can also increase your risk for osteoarthritis, sleep apnea, and other respiratory problems. Aim for a slow, steady weight loss. Even a small amount of weight loss can lower your risk of health problems.  How to lose weight safely:  A safe and healthy way to lose weight is to eat fewer calories and get regular exercise. You can lose up about 1 pound a week by decreasing the number of calories you eat by 500 calories each day.   Healthy meal plan for weight management:  A healthy meal plan includes a variety of foods, contains fewer calories, and helps you stay healthy. A healthy meal plan includes the following:  Eat whole-grain foods more often.  A healthy meal plan should contain fiber. Fiber is the part of grains, fruits, and vegetables that is not broken down by your body. Whole-grain foods are healthy and provide extra fiber in your diet. Some examples of whole-grain foods are whole-wheat breads and pastas, oatmeal, brown rice, and bulgur.  Eat a variety of vegetables every day.  Include dark, leafy greens such as spinach, kale, td greens, and mustard greens. Eat yellow and orange vegetables such as carrots, sweet potatoes, and winter squash.   Eat a variety of fruits every day.  Choose fresh or canned fruit (canned in its own juice or light syrup) instead of juice. Fruit juice has very little or no fiber.  Eat low-fat dairy foods.  Drink fat-free (skim) milk or 1% milk. Eat fat-free yogurt and low-fat cottage cheese. Try low-fat cheeses such as mozzarella and other reduced-fat  cheeses.  Choose meat and other protein foods that are low in fat.  Choose beans or other legumes such as split peas or lentils. Choose fish, skinless poultry (chicken or turkey), or lean cuts of red meat (beef or pork). Before you cook meat or poultry, cut off any visible fat.   Use less fat and oil.  Try baking foods instead of frying them. Add less fat, such as margarine, sour cream, regular salad dressing and mayonnaise to foods. Eat fewer high-fat foods. Some examples of high-fat foods include french fries, doughnuts, ice cream, and cakes.  Eat fewer sweets.  Limit foods and drinks that are high in sugar. This includes candy, cookies, regular soda, and sweetened drinks.  Exercise:  Exercise at least 30 minutes per day on most days of the week. Some examples of exercise include walking, biking, dancing, and swimming. You can also fit in more physical activity by taking the stairs instead of the elevator or parking farther away from stores. Ask your healthcare provider about the best exercise plan for you.      © Copyright [employer] 2018 Information is for End User's use only and may not be sold, redistributed or otherwise used for commercial purposes. All illustrations and images included in CareNotes® are the copyrighted property of [employer] Inc[employer] or [employer]

## 2024-04-12 NOTE — ASSESSMENT & PLAN NOTE
Blood pressure managed in office today.  Patient is attempting to become more physically active take since surgery.  Encouraged to continue with activity as tolerated, heartedly diet.  Metoprolol as prescribed.

## 2024-04-12 NOTE — ASSESSMENT & PLAN NOTE
Recently completed low-dose vitamin D.  Will initiate daily dose of vitamin D.  Advised to take daily with food.  To obtain DEXA scan as ordered.

## 2024-04-12 NOTE — PROGRESS NOTES
Assessment and Plan:     Problem List Items Addressed This Visit          Cardiovascular and Mediastinum    Hypertension     Blood pressure managed in office today.  Patient is attempting to become more physically active take since surgery.  Encouraged to continue with activity as tolerated, heartedly diet.  Metoprolol as prescribed.            Other    Profound Vitamin D deficiency     Recently completed low-dose vitamin D.  Will initiate daily dose of vitamin D.  Advised to take daily with food.  To obtain DEXA scan as ordered.         Relevant Medications    Cholecalciferol (Vitamin D) 50 MCG (2000 UT) tablet    Prediabetes     Hemoglobin A1c during hospitalization noted to be 5.7%.  Patient denies history of diabetes or family history of diabetes.  Denies symptoms related to elevated blood sugars.  Is attempting to make diet modifications, engage in more physical activity.  Encouraged avoiding excessive intake of added sugars.  Limiting portion sizes of refined carbohydrate foods such as white bread, white rice.  Incorporating fiber by eating a variety of fruits, vegetables, and whole grains.   Limiting saturated and trans fat by choosing lean protein and low-fat dairy.   Can repeat blood work in 3-6 months.          Other Visit Diagnoses       Encounter for annual wellness visit (AWV) in Medicare patient    -  Primary    Asymptomatic menopausal state        Relevant Orders    DXA bone density spine hip and pelvis             Preventive health issues were discussed with patient, and age appropriate screening tests were ordered as noted in patient's After Visit Summary.  Personalized health advice and appropriate referrals for health education or preventive services given if needed, as noted in patient's After Visit Summary.     History of Present Illness:     Patient presents for a Medicare Wellness Visit    Patient presents office for Medicare wellness visit.  Has been following general surgery status post  hernia repair with mesh.  Staples and wound drains removed by surgery.  Patient continues to heal well.  Denies abdominal pain, constipation, drainage from incision site, fever, chills.  Is beginning to engage in more physical activity.    Denies headaches, dizziness, chest pain, SOB.       Patient Care Team:  KATE Hernandez as PCP - General (Family Medicine)     Review of Systems:     Review of Systems   Constitutional:  Negative for activity change, appetite change, chills, diaphoresis, fatigue and fever.   HENT:  Negative for ear pain and sore throat.    Eyes:  Negative for photophobia and visual disturbance.   Respiratory:  Negative for cough and shortness of breath.    Cardiovascular:  Negative for chest pain and palpitations.   Gastrointestinal:  Negative for abdominal distention, abdominal pain, blood in stool, constipation, diarrhea, nausea and vomiting.   Endocrine: Negative for polydipsia, polyphagia and polyuria.   Genitourinary:  Negative for decreased urine volume, dysuria, flank pain, frequency, hematuria and urgency.   Musculoskeletal:  Negative for arthralgias, back pain and myalgias.   Skin:  Negative for color change and rash.   Neurological:  Negative for dizziness, weakness, light-headedness, numbness and headaches.   Hematological:  Negative for adenopathy. Does not bruise/bleed easily.   Psychiatric/Behavioral:  Negative for agitation, confusion and sleep disturbance.         Problem List:     Patient Active Problem List   Diagnosis    Small bowel obstruction (HCC)    Incarcerated incisional hernia    Morbid obesity (HCC)    Hypertension    Hypocalcemia    Hyponatremia    Hypomagnesemia    Shortness of breath    Profound Vitamin D deficiency    Prediabetes      Past Medical and Surgical History:     No past medical history on file.  Past Surgical History:   Procedure Laterality Date    CHOLECYSTECTOMY      HERNIA REPAIR Right 03/04/2024    Procedure: Laproscopic Open incisional hernia  repair with mesh, Extenisve lysis of adhesions, Partial omentectomy;  Surgeon: Jesus Gallo MD;  Location: [facility];  Service: General    WISDOM TOOTH EXTRACTION        Family History:     Family History   Problem Relation Age of Onset    No Known Problems Mother     No Known Problems Father       Social History:     Social History     Socioeconomic History    Marital status: /Civil Union     Spouse name: None    Number of children: None    Years of education: None    Highest education level: None   Occupational History    None   Tobacco Use    Smoking status: Former     Types: Cigarettes     Start date: 1992     Quit date: 1972     Years since quittin.3     Passive exposure: Past    Smokeless tobacco: Never   Vaping Use    Vaping status: Never Used   Substance and Sexual Activity    Alcohol use: Not Currently    Drug use: Never    Sexual activity: None   Other Topics Concern    None   Social History Narrative    None     Social Determinants of Health     Financial Resource Strain: Not on file   Food Insecurity: No Food Insecurity (2024)    Hunger Vital Sign     Worried About Running Out of Food in the Last Year: Never true     Ran Out of Food in the Last Year: Never true   Transportation Needs: No Transportation Needs (2024)    PRAPARE - Transportation     Lack of Transportation (Medical): No     Lack of Transportation (Non-Medical): No   Physical Activity: Not on file   Stress: Not on file   Social Connections: Not on file   Intimate Partner Violence: Not on file   Housing Stability: Low Risk  (2024)    Housing Stability Vital Sign     Unable to Pay for Housing in the Last Year: No     Number of Places Lived in the Last Year: 1     Unstable Housing in the Last Year: No      Medications and Allergies:     Current Outpatient Medications   Medication Sig Dispense Refill    Cholecalciferol (Vitamin D) 50 MCG (2000 UT) tablet Take 1 tablet (2,000 Units total) by mouth daily 90  tablet 1    metoprolol tartrate (LOPRESSOR) 25 mg tablet Take 1 tablet (25 mg total) by mouth every 12 (twelve) hours 60 tablet 3     No current facility-administered medications for this visit.     No Known Allergies   Immunizations:       There is no immunization history on file for this patient.   Health Maintenance:         Topic Date Due    Hepatitis C Screening  Never done    Breast Cancer Screening: Mammogram  Never done    Colorectal Cancer Screening  Never done         Topic Date Due    Pneumococcal Vaccine: 65+ Years (1 of 1 - PCV) Never done    Influenza Vaccine (1) Never done    COVID-19 Vaccine (1 - 2023-24 season) Never done      Medicare Screening Tests and Risk Assessments:         Health Risk Assessment:   Patient rates overall health as very good. Patient feels that their physical health rating is slightly better. Patient is satisfied with their life. Eyesight was rated as same. Hearing was rated as same. Patient feels that their emotional and mental health rating is same. Patients states they are never, rarely angry. Patient states they are never, rarely unusually tired/fatigued. Pain experienced in the last 7 days has been none. Patient states that she has experienced no weight loss or gain in last 6 months.     Fall Risk Screening:   In the past year, patient has experienced: no history of falling in past year      Urinary Incontinence Screening:   Patient has not leaked urine accidently in the last six months.     Home Safety:  Patient does not have trouble with stairs inside or outside of their home. Patient has working smoke alarms and has working carbon monoxide detector. Home safety hazards include: none.     Nutrition:   Current diet is Regular.     Medications:   Patient is not currently taking any over-the-counter supplements. Patient is able to manage medications.     Activities of Daily Living (ADLs)/Instrumental Activities of Daily Living (IADLs):   Walk and transfer into and out of  "bed and chair?: Yes  Dress and groom yourself?: Yes    Bathe or shower yourself?: Yes    Feed yourself? Yes  Do your laundry/housekeeping?: Yes  Manage your money, pay your bills and track your expenses?: Yes  Make your own meals?: Yes    Do your own shopping?: Yes    ADL comments: Lives with  at home    Previous Hospitalizations:   Any hospitalizations or ED visits within the last 12 months?: Yes    How many hospitalizations have you had in the last year?: 1-2    Advance Care Planning:   Living will: No      PREVENTIVE SCREENINGS      Cardiovascular Screening:      Due for: Lipid Panel      Diabetes Screening:     General: Screening Current      Colorectal Cancer Screening:       Due for: FOBT/FIT      Breast Cancer Screening:       Due for: Mammogram        Cervical Cancer Screening:    General: Screening Not Indicated      Osteoporosis Screening:    General: Risks and Benefits Discussed    Due for: Bone Density Ultrasound      Abdominal Aortic Aneurysm (AAA) Screening:        General: Screening Not Indicated      Lung Cancer Screening:     General: Screening Not Indicated      Hepatitis C Screening:    General: Patient Declines    Screening, Brief Intervention, and Referral to Treatment (SBIRT)    Screening  Typical number of drinks in a day: 0  Typical number of drinks in a week: 0  Interpretation: Low risk drinking behavior.    Single Item Drug Screening:  How often have you used an illegal drug (including marijuana) or a prescription medication for non-medical reasons in the past year? never    Single Item Drug Screen Score: 0  Interpretation: Negative screen for possible drug use disorder    Other Counseling Topics:   Car/seat belt/driving safety, skin self-exam, sunscreen and regular weightbearing exercise and calcium and vitamin D intake.     No results found.     Physical Exam:     /74 (BP Location: Left arm, Patient Position: Sitting)   Pulse 83   Temp 98 °F (36.7 °C)   Ht 5' 4\" (1.626 m)  "  Wt 128 kg (282 lb 9.6 oz)   SpO2 97%   BMI 48.51 kg/m²     Physical Exam  Vitals reviewed.   Constitutional:       General: She is not in acute distress.     Appearance: She is obese. She is not ill-appearing.   HENT:      Head: Normocephalic and atraumatic.      Right Ear: Tympanic membrane, ear canal and external ear normal.      Left Ear: Tympanic membrane, ear canal and external ear normal.      Nose: Nose normal.      Mouth/Throat:      Mouth: Mucous membranes are moist.      Pharynx: Oropharynx is clear.   Eyes:      Conjunctiva/sclera: Conjunctivae normal.      Pupils: Pupils are equal, round, and reactive to light.   Cardiovascular:      Rate and Rhythm: Normal rate and regular rhythm.      Pulses: Normal pulses.      Heart sounds: Normal heart sounds.   Pulmonary:      Effort: Pulmonary effort is normal.      Breath sounds: Normal breath sounds.   Abdominal:      General: Abdomen is protuberant. A surgical scar is present. Bowel sounds are normal.      Palpations: Abdomen is soft.      Tenderness: There is no abdominal tenderness. There is no right CVA tenderness or left CVA tenderness.      Hernia: No hernia is present.   Musculoskeletal:      Cervical back: Normal range of motion and neck supple.      Right lower leg: No edema.      Left lower leg: No edema.   Skin:     General: Skin is warm and dry.      Capillary Refill: Capillary refill takes less than 2 seconds.   Neurological:      General: No focal deficit present.      Mental Status: She is alert and oriented to person, place, and time.   Psychiatric:         Mood and Affect: Mood normal.         Behavior: Behavior normal.          KATE Hernandez

## 2024-04-12 NOTE — ASSESSMENT & PLAN NOTE
Hemoglobin A1c during hospitalization noted to be 5.7%.  Patient denies history of diabetes or family history of diabetes.  Denies symptoms related to elevated blood sugars.  Is attempting to make diet modifications, engage in more physical activity.  Encouraged avoiding excessive intake of added sugars.  Limiting portion sizes of refined carbohydrate foods such as white bread, white rice.  Incorporating fiber by eating a variety of fruits, vegetables, and whole grains.   Limiting saturated and trans fat by choosing lean protein and low-fat dairy.   Can repeat blood work in 3-6 months.

## 2024-07-06 DIAGNOSIS — I10 HYPERTENSION, UNSPECIFIED TYPE: ICD-10-CM

## 2024-08-07 ENCOUNTER — RA CDI HCC (OUTPATIENT)
Dept: OTHER | Facility: HOSPITAL | Age: 69
End: 2024-08-07

## 2024-08-13 ENCOUNTER — OFFICE VISIT (OUTPATIENT)
Dept: FAMILY MEDICINE CLINIC | Facility: CLINIC | Age: 69
End: 2024-08-13
Payer: MEDICARE

## 2024-08-13 VITALS
TEMPERATURE: 97.2 F | SYSTOLIC BLOOD PRESSURE: 146 MMHG | HEART RATE: 91 BPM | WEIGHT: 270 LBS | DIASTOLIC BLOOD PRESSURE: 82 MMHG | OXYGEN SATURATION: 98 % | HEIGHT: 64 IN | BODY MASS INDEX: 46.1 KG/M2

## 2024-08-13 DIAGNOSIS — Z12.31 ENCOUNTER FOR SCREENING MAMMOGRAM FOR MALIGNANT NEOPLASM OF BREAST: ICD-10-CM

## 2024-08-13 DIAGNOSIS — Z12.11 SCREENING FOR COLON CANCER: ICD-10-CM

## 2024-08-13 DIAGNOSIS — I10 HYPERTENSION, UNSPECIFIED TYPE: ICD-10-CM

## 2024-08-13 DIAGNOSIS — R73.03 PREDIABETES: ICD-10-CM

## 2024-08-13 DIAGNOSIS — E78.00 HYPERCHOLESTEREMIA: ICD-10-CM

## 2024-08-13 DIAGNOSIS — Z11.59 ENCOUNTER FOR HEPATITIS C SCREENING TEST FOR LOW RISK PATIENT: ICD-10-CM

## 2024-08-13 DIAGNOSIS — Z76.89 ENCOUNTER TO ESTABLISH CARE: ICD-10-CM

## 2024-08-13 DIAGNOSIS — Z23 ENCOUNTER FOR IMMUNIZATION: ICD-10-CM

## 2024-08-13 DIAGNOSIS — Z13.820 SCREENING FOR OSTEOPOROSIS: ICD-10-CM

## 2024-08-13 DIAGNOSIS — Z13.220 SCREENING FOR HYPERCHOLESTEROLEMIA: Primary | Chronic | ICD-10-CM

## 2024-08-13 DIAGNOSIS — Z78.0 ASYMPTOMATIC POSTMENOPAUSAL STATE: ICD-10-CM

## 2024-08-13 PROBLEM — E87.1 HYPONATREMIA: Status: RESOLVED | Noted: 2024-03-06 | Resolved: 2024-08-13

## 2024-08-13 PROBLEM — E83.42 HYPOMAGNESEMIA: Status: RESOLVED | Noted: 2024-03-06 | Resolved: 2024-08-13

## 2024-08-13 PROBLEM — I49.3 PVC (PREMATURE VENTRICULAR CONTRACTION): Status: ACTIVE | Noted: 2024-08-13

## 2024-08-13 PROBLEM — R06.02 SHORTNESS OF BREATH: Status: RESOLVED | Noted: 2024-03-06 | Resolved: 2024-08-13

## 2024-08-13 PROBLEM — E83.51 HYPOCALCEMIA: Status: RESOLVED | Noted: 2024-03-05 | Resolved: 2024-08-13

## 2024-08-13 PROCEDURE — G2211 COMPLEX E/M VISIT ADD ON: HCPCS | Performed by: STUDENT IN AN ORGANIZED HEALTH CARE EDUCATION/TRAINING PROGRAM

## 2024-08-13 PROCEDURE — 99214 OFFICE O/P EST MOD 30 MIN: CPT | Performed by: STUDENT IN AN ORGANIZED HEALTH CARE EDUCATION/TRAINING PROGRAM

## 2024-08-13 RX ORDER — LISINOPRIL 20 MG/1
20 TABLET ORAL DAILY
Qty: 60 TABLET | Refills: 0 | Status: SHIPPED | OUTPATIENT
Start: 2024-08-13

## 2024-08-13 NOTE — PROGRESS NOTES
Ambulatory Visit  Name: Ivonne Marcos      : 1955      MRN: [id number]  Encounter Provider: Alvino German MD  Encounter Date: 2024   Encounter department: [facility] PRIMARY CARE    Assessment & Plan   1. Screening for hypercholesterolemia  2. Encounter to establish care  3. Encounter for immunization  -     Pneumococcal Conjugate Vaccine 20-valent (Pcv20)  4. Screening for colon cancer  -     Ambulatory Referral to Gastroenterology; Future  5. Screening for osteoporosis  6. Encounter for screening mammogram for malignant neoplasm of breast  -     Mammo screening bilateral w 3d & cad; Future  7. Asymptomatic postmenopausal state  -     DXA bone density spine hip and pelvis; Future; Expected date: 2024  8. Prediabetes  Assessment & Plan:  Last hba1c 5.7   Due for repeat  Stressed lifestyle modifications     Orders:  -     Comprehensive metabolic panel; Future  -     Lipid Panel with Direct LDL reflex; Future  -     Hemoglobin A1C; Future  -     TSH, 3rd generation with Free T4 reflex; Future  -     CBC and differential; Future  9. Encounter for hepatitis C screening test for low risk patient  -     Hepatitis C antibody; Future  10. Hypertension, unspecified type  Assessment & Plan:  Was on toprol  Will d/c, start lisinopril given predibates  Orders:  -     lisinopril (ZESTRIL) 20 mg tablet; Take 1 tablet (20 mg total) by mouth daily  11. Hypercholesteremia  -     Lipid Panel with Direct LDL reflex; Future       Patient refusing DXA, Colonoscopy, Mammo. despite Extensive counseling  Pt defering Pneumovax    History of Present Illness     HPI    Pt presents as new patietn encounter, denies any new problems or issues       Review of Systems   Constitutional:  Negative for activity change, appetite change, chills, fatigue and fever.   HENT:  Negative for congestion, dental problem, drooling, ear discharge, ear pain, facial swelling, postnasal drip, rhinorrhea and sinus pain.    Eyes:  " Negative for photophobia, pain, discharge and itching.   Respiratory:  Negative for apnea, cough, chest tightness and shortness of breath.    Cardiovascular:  Negative for chest pain and leg swelling.   Gastrointestinal:  Negative for abdominal distention, abdominal pain, anal bleeding, constipation, diarrhea and nausea.   Endocrine: Negative for cold intolerance, heat intolerance and polydipsia.   Genitourinary:  Negative for difficulty urinating.   Musculoskeletal:  Negative for arthralgias, gait problem, joint swelling and myalgias.   Skin:  Negative for color change and pallor.   Allergic/Immunologic: Negative for immunocompromised state.   Neurological:  Negative for dizziness, seizures, facial asymmetry, weakness, light-headedness, numbness and headaches.   Psychiatric/Behavioral:  Negative for agitation, behavioral problems, confusion, decreased concentration and dysphoric mood.    All other systems reviewed and are negative.      Objective     /82 (BP Location: Left arm, Patient Position: Sitting, Cuff Size: Large)   Pulse 91   Temp (!) 97.2 °F (36.2 °C) (Tympanic)   Ht 5' 4\" (1.626 m)   Wt 122 kg (270 lb)   SpO2 98%   BMI 46.35 kg/m²     Physical Exam  Vitals and nursing note reviewed.   Constitutional:       General: She is not in acute distress.     Appearance: She is well-developed. She is obese.   HENT:      Head: Normocephalic and atraumatic.   Eyes:      Conjunctiva/sclera: Conjunctivae normal.      Pupils: Pupils are equal, round, and reactive to light.   Cardiovascular:      Rate and Rhythm: Normal rate and regular rhythm.      Heart sounds: Normal heart sounds. No murmur heard.     No friction rub.   Pulmonary:      Effort: Pulmonary effort is normal.      Breath sounds: Normal breath sounds.   Abdominal:      General: Bowel sounds are normal.      Palpations: Abdomen is soft.   Musculoskeletal:         General: Normal range of motion.      Cervical back: Normal range of motion and " neck supple.   Skin:     General: Skin is warm.      Capillary Refill: Capillary refill takes less than 2 seconds.   Neurological:      Mental Status: She is alert and oriented to person, place, and time.      Motor: No abnormal muscle tone.      Coordination: Coordination normal.   Psychiatric:         Behavior: Behavior normal.         Thought Content: Thought content normal.       Administrative Statements

## 2024-08-22 ENCOUNTER — LAB (OUTPATIENT)
Age: 69
End: 2024-08-22
Payer: MEDICARE

## 2024-08-22 DIAGNOSIS — E78.00 HYPERCHOLESTEREMIA: ICD-10-CM

## 2024-08-22 DIAGNOSIS — Z11.59 ENCOUNTER FOR HEPATITIS C SCREENING TEST FOR LOW RISK PATIENT: ICD-10-CM

## 2024-08-22 DIAGNOSIS — R73.03 PREDIABETES: ICD-10-CM

## 2024-08-22 LAB
ALBUMIN SERPL BCG-MCNC: 4 G/DL (ref 3.5–5)
ALP SERPL-CCNC: 113 U/L (ref 34–104)
ALT SERPL W P-5'-P-CCNC: 21 U/L (ref 7–52)
ANION GAP SERPL CALCULATED.3IONS-SCNC: 9 MMOL/L (ref 4–13)
AST SERPL W P-5'-P-CCNC: 16 U/L (ref 13–39)
BASOPHILS # BLD AUTO: 0.02 THOUSANDS/ÂΜL (ref 0–0.1)
BASOPHILS NFR BLD AUTO: 0 % (ref 0–1)
BILIRUB SERPL-MCNC: 0.48 MG/DL (ref 0.2–1)
BUN SERPL-MCNC: 15 MG/DL (ref 5–25)
CALCIUM SERPL-MCNC: 9.3 MG/DL (ref 8.4–10.2)
CHLORIDE SERPL-SCNC: 103 MMOL/L (ref 96–108)
CHOLEST SERPL-MCNC: 176 MG/DL
CO2 SERPL-SCNC: 27 MMOL/L (ref 21–32)
CREAT SERPL-MCNC: 0.72 MG/DL (ref 0.6–1.3)
EOSINOPHIL # BLD AUTO: 0.11 THOUSAND/ÂΜL (ref 0–0.61)
EOSINOPHIL NFR BLD AUTO: 2 % (ref 0–6)
ERYTHROCYTE [DISTWIDTH] IN BLOOD BY AUTOMATED COUNT: 13.2 % (ref 11.6–15.1)
EST. AVERAGE GLUCOSE BLD GHB EST-MCNC: 114 MG/DL
GFR SERPL CREATININE-BSD FRML MDRD: 85 ML/MIN/1.73SQ M
GLUCOSE P FAST SERPL-MCNC: 94 MG/DL (ref 65–99)
HBA1C MFR BLD: 5.6 %
HCT VFR BLD AUTO: 44.5 % (ref 34.8–46.1)
HDLC SERPL-MCNC: 48 MG/DL
HGB BLD-MCNC: 14.9 G/DL (ref 11.5–15.4)
IMM GRANULOCYTES # BLD AUTO: 0.02 THOUSAND/UL (ref 0–0.2)
IMM GRANULOCYTES NFR BLD AUTO: 0 % (ref 0–2)
LDLC SERPL CALC-MCNC: 105 MG/DL (ref 0–100)
LYMPHOCYTES # BLD AUTO: 2.25 THOUSANDS/ÂΜL (ref 0.6–4.47)
LYMPHOCYTES NFR BLD AUTO: 40 % (ref 14–44)
MCH RBC QN AUTO: 31.2 PG (ref 26.8–34.3)
MCHC RBC AUTO-ENTMCNC: 33.5 G/DL (ref 31.4–37.4)
MCV RBC AUTO: 93 FL (ref 82–98)
MONOCYTES # BLD AUTO: 0.35 THOUSAND/ÂΜL (ref 0.17–1.22)
MONOCYTES NFR BLD AUTO: 6 % (ref 4–12)
NEUTROPHILS # BLD AUTO: 2.87 THOUSANDS/ÂΜL (ref 1.85–7.62)
NEUTS SEG NFR BLD AUTO: 52 % (ref 43–75)
NRBC BLD AUTO-RTO: 0 /100 WBCS
PLATELET # BLD AUTO: 198 THOUSANDS/UL (ref 149–390)
PMV BLD AUTO: 11.1 FL (ref 8.9–12.7)
POTASSIUM SERPL-SCNC: 3.9 MMOL/L (ref 3.5–5.3)
PROT SERPL-MCNC: 7.6 G/DL (ref 6.4–8.4)
RBC # BLD AUTO: 4.77 MILLION/UL (ref 3.81–5.12)
SODIUM SERPL-SCNC: 139 MMOL/L (ref 135–147)
TRIGL SERPL-MCNC: 117 MG/DL
TSH SERPL DL<=0.05 MIU/L-ACNC: 3.18 UIU/ML (ref 0.45–4.5)
WBC # BLD AUTO: 5.62 THOUSAND/UL (ref 4.31–10.16)

## 2024-08-22 PROCEDURE — 85025 COMPLETE CBC W/AUTO DIFF WBC: CPT

## 2024-08-22 PROCEDURE — 84443 ASSAY THYROID STIM HORMONE: CPT

## 2024-08-22 PROCEDURE — 36415 COLL VENOUS BLD VENIPUNCTURE: CPT

## 2024-08-22 PROCEDURE — 80061 LIPID PANEL: CPT

## 2024-08-22 PROCEDURE — 83036 HEMOGLOBIN GLYCOSYLATED A1C: CPT

## 2024-08-22 PROCEDURE — 80053 COMPREHEN METABOLIC PANEL: CPT

## 2024-08-22 PROCEDURE — 86803 HEPATITIS C AB TEST: CPT

## 2024-08-23 LAB — HCV AB SER QL: NORMAL

## 2024-10-05 DIAGNOSIS — I10 HYPERTENSION, UNSPECIFIED TYPE: ICD-10-CM

## 2024-10-07 RX ORDER — LISINOPRIL 20 MG/1
20 TABLET ORAL DAILY
Qty: 90 TABLET | Refills: 1 | Status: SHIPPED | OUTPATIENT
Start: 2024-10-07

## 2025-01-08 ENCOUNTER — TELEPHONE (OUTPATIENT)
Dept: FAMILY MEDICINE CLINIC | Facility: CLINIC | Age: 70
End: 2025-01-08

## 2025-04-01 DIAGNOSIS — I10 HYPERTENSION, UNSPECIFIED TYPE: ICD-10-CM

## 2025-04-02 RX ORDER — LISINOPRIL 20 MG/1
20 TABLET ORAL DAILY
Qty: 90 TABLET | Refills: 1 | Status: SHIPPED | OUTPATIENT
Start: 2025-04-02

## 2025-04-17 ENCOUNTER — OFFICE VISIT (OUTPATIENT)
Dept: FAMILY MEDICINE CLINIC | Facility: CLINIC | Age: 70
End: 2025-04-17
Payer: COMMERCIAL

## 2025-04-17 VITALS
HEART RATE: 73 BPM | TEMPERATURE: 97.7 F | HEIGHT: 64 IN | SYSTOLIC BLOOD PRESSURE: 138 MMHG | BODY MASS INDEX: 44.22 KG/M2 | DIASTOLIC BLOOD PRESSURE: 72 MMHG | OXYGEN SATURATION: 98 % | WEIGHT: 259 LBS

## 2025-04-17 DIAGNOSIS — Z00.00 MEDICARE ANNUAL WELLNESS VISIT, INITIAL: Primary | ICD-10-CM

## 2025-04-17 DIAGNOSIS — I10 PRIMARY HYPERTENSION: ICD-10-CM

## 2025-04-17 PROBLEM — E66.01 MORBID OBESITY (HCC): Status: RESOLVED | Noted: 2024-02-27 | Resolved: 2025-04-17

## 2025-04-17 PROCEDURE — G0439 PPPS, SUBSEQ VISIT: HCPCS | Performed by: STUDENT IN AN ORGANIZED HEALTH CARE EDUCATION/TRAINING PROGRAM

## 2025-04-17 NOTE — PATIENT INSTRUCTIONS
Medicare Preventive Visit Patient Instructions  Thank you for completing your Welcome to Medicare Visit or Medicare Annual Wellness Visit today. Your next wellness visit will be due in one year (4/18/2026).  The screening/preventive services that you may require over the next 5-10 years are detailed below. Some tests may not apply to you based off risk factors and/or age. Screening tests ordered at today's visit but not completed yet may show as past due. Also, please note that scanned in results may not display below.  Preventive Screenings:  Service Recommendations Previous Testing/Comments   Colorectal Cancer Screening  * Colonoscopy    * Fecal Occult Blood Test (FOBT)/Fecal Immunochemical Test (FIT)  * Fecal DNA/Cologuard Test  * Flexible Sigmoidoscopy Age: 45-75 years old   Colonoscopy: every 10 years (may be performed more frequently if at higher risk)  OR  FOBT/FIT: every 1 year  OR  Cologuard: every 3 years  OR  Sigmoidoscopy: every 5 years  Screening may be recommended earlier than age 45 if at higher risk for colorectal cancer. Also, an individualized decision between you and your healthcare provider will decide whether screening between the ages of 76-85 would be appropriate. Colonoscopy: Not on file  FOBT/FIT: Not on file  Cologuard: Not on file  Sigmoidoscopy: Not on file          Breast Cancer Screening Age: 40+ years old  Frequency: every 1-2 years  Not required if history of left and right mastectomy Mammogram: Not on file        Cervical Cancer Screening Between the ages of 21-29, pap smear recommended once every 3 years.   Between the ages of 30-65, can perform pap smear with HPV co-testing every 5 years.   Recommendations may differ for women with a history of total hysterectomy, cervical cancer, or abnormal pap smears in past. Pap Smear: Not on file    Screening Not Indicated   Hepatitis C Screening Once for adults born between 1945 and 1965  More frequently in patients at high risk for Hepatitis  C Hep C Antibody: 08/22/2024    Screening Current   Diabetes Screening 1-2 times per year if you're at risk for diabetes or have pre-diabetes Fasting glucose: 94 mg/dL (8/22/2024)  A1C: 5.6 % (8/22/2024)  Screening Current   Cholesterol Screening Once every 5 years if you don't have a lipid disorder. May order more often based on risk factors. Lipid panel: 08/22/2024    Screening Not Indicated  History Lipid Disorder     Other Preventive Screenings Covered by Medicare:  Abdominal Aortic Aneurysm (AAA) Screening: covered once if your at risk. You're considered to be at risk if you have a family history of AAA.  Lung Cancer Screening: covers low dose CT scan once per year if you meet all of the following conditions: (1) Age 55-77; (2) No signs or symptoms of lung cancer; (3) Current smoker or have quit smoking within the last 15 years; (4) You have a tobacco smoking history of at least 20 pack years (packs per day multiplied by number of years you smoked); (5) You get a written order from a healthcare provider.  Glaucoma Screening: covered annually if you're considered high risk: (1) You have diabetes OR (2) Family history of glaucoma OR (3)  aged 50 and older OR (4)  American aged 65 and older  Osteoporosis Screening: covered every 2 years if you meet one of the following conditions: (1) You're estrogen deficient and at risk for osteoporosis based off medical history and other findings; (2) Have a vertebral abnormality; (3) On glucocorticoid therapy for more than 3 months; (4) Have primary hyperparathyroidism; (5) On osteoporosis medications and need to assess response to drug therapy.   Last bone density test (DXA Scan): Not on file.  HIV Screening: covered annually if you're between the age of 15-65. Also covered annually if you are younger than 15 and older than 65 with risk factors for HIV infection. For pregnant patients, it is covered up to 3 times per  pregnancy.    Immunizations:  Immunization Recommendations   Influenza Vaccine Annual influenza vaccination during flu season is recommended for all persons aged >= 6 months who do not have contraindications   Pneumococcal Vaccine   * Pneumococcal conjugate vaccine = PCV13 (Prevnar 13), PCV15 (Vaxneuvance), PCV20 (Prevnar 20)  * Pneumococcal polysaccharide vaccine = PPSV23 (Pneumovax) Adults 19-65 yo with certain risk factors or if 65+ yo  If never received any pneumonia vaccine: recommend Prevnar 20 (PCV20)  Give PCV20 if previously received 1 dose of PCV13 or PPSV23   Hepatitis B Vaccine 3 dose series if at intermediate or high risk (ex: diabetes, end stage renal disease, liver disease)   Respiratory syncytial virus (RSV) Vaccine - COVERED BY MEDICARE PART D  * RSVPreF3 (Arexvy) CDC recommends that adults 60 years of age and older may receive a single dose of RSV vaccine using shared clinical decision-making (SCDM)   Tetanus (Td) Vaccine - COST NOT COVERED BY MEDICARE PART B Following completion of primary series, a booster dose should be given every 10 years to maintain immunity against tetanus. Td may also be given as tetanus wound prophylaxis.   Tdap Vaccine - COST NOT COVERED BY MEDICARE PART B Recommended at least once for all adults. For pregnant patients, recommended with each pregnancy.   Shingles Vaccine (Shingrix) - COST NOT COVERED BY MEDICARE PART B  2 shot series recommended in those 19 years and older who have or will have weakened immune systems or those 50 years and older     Health Maintenance Due:      Topic Date Due   • Colorectal Cancer Screening  08/13/2025 (Originally 10/20/1999)   • Breast Cancer Screening: Mammogram  08/13/2025 (Originally 10/20/1994)   • Hepatitis C Screening  Completed     Immunizations Due:      Topic Date Due   • Influenza Vaccine (1) Never done   • COVID-19 Vaccine (1 - 2024-25 season) Never done     Advance Directives   What are advance directives?  Advance directives  are legal documents that state your wishes and plans for medical care. These plans are made ahead of time in case you lose your ability to make decisions for yourself. Advance directives can apply to any medical decision, such as the treatments you want, and if you want to donate organs.   What are the types of advance directives?  There are many types of advance directives, and each state has rules about how to use them. You may choose a combination of any of the following:  Living will:  This is a written record of the treatment you want. You can also choose which treatments you do not want, which to limit, and which to stop at a certain time. This includes surgery, medicine, IV fluid, and tube feedings.   Durable power of  for healthcare (DPAHC):  This is a written record that states who you want to make healthcare choices for you when you are unable to make them for yourself. This person, called a proxy, is usually a family member or a friend. You may choose more than 1 proxy.  Do not resuscitate (DNR) order:  A DNR order is used in case your heart stops beating or you stop breathing. It is a request not to have certain forms of treatment, such as CPR. A DNR order may be included in other types of advance directives.  Medical directive:  This covers the care that you want if you are in a coma, near death, or unable to make decisions for yourself. You can list the treatments you want for each condition. Treatment may include pain medicine, surgery, blood transfusions, dialysis, IV or tube feedings, and a ventilator (breathing machine).  Values history:  This document has questions about your views, beliefs, and how you feel and think about life. This information can help others choose the care that you would choose.  Why are advance directives important?  An advance directive helps you control your care. Although spoken wishes may be used, it is better to have your wishes written down. Spoken wishes can  be misunderstood, or not followed. Treatments may be given even if you do not want them. An advance directive may make it easier for your family to make difficult choices about your care.   Weight Management   Why it is important to manage your weight:  Being overweight increases your risk of health conditions such as heart disease, high blood pressure, type 2 diabetes, and certain types of cancer. It can also increase your risk for osteoarthritis, sleep apnea, and other respiratory problems. Aim for a slow, steady weight loss. Even a small amount of weight loss can lower your risk of health problems.  How to lose weight safely:  A safe and healthy way to lose weight is to eat fewer calories and get regular exercise. You can lose up about 1 pound a week by decreasing the number of calories you eat by 500 calories each day.   Healthy meal plan for weight management:  A healthy meal plan includes a variety of foods, contains fewer calories, and helps you stay healthy. A healthy meal plan includes the following:  Eat whole-grain foods more often.  A healthy meal plan should contain fiber. Fiber is the part of grains, fruits, and vegetables that is not broken down by your body. Whole-grain foods are healthy and provide extra fiber in your diet. Some examples of whole-grain foods are whole-wheat breads and pastas, oatmeal, brown rice, and bulgur.  Eat a variety of vegetables every day.  Include dark, leafy greens such as spinach, kale, td greens, and mustard greens. Eat yellow and orange vegetables such as carrots, sweet potatoes, and winter squash.   Eat a variety of fruits every day.  Choose fresh or canned fruit (canned in its own juice or light syrup) instead of juice. Fruit juice has very little or no fiber.  Eat low-fat dairy foods.  Drink fat-free (skim) milk or 1% milk. Eat fat-free yogurt and low-fat cottage cheese. Try low-fat cheeses such as mozzarella and other reduced-fat cheeses.  Choose meat and other  protein foods that are low in fat.  Choose beans or other legumes such as split peas or lentils. Choose fish, skinless poultry (chicken or turkey), or lean cuts of red meat (beef or pork). Before you cook meat or poultry, cut off any visible fat.   Use less fat and oil.  Try baking foods instead of frying them. Add less fat, such as margarine, sour cream, regular salad dressing and mayonnaise to foods. Eat fewer high-fat foods. Some examples of high-fat foods include french fries, doughnuts, ice cream, and cakes.  Eat fewer sweets.  Limit foods and drinks that are high in sugar. This includes candy, cookies, regular soda, and sweetened drinks.  Exercise:  Exercise at least 30 minutes per day on most days of the week. Some examples of exercise include walking, biking, dancing, and swimming. You can also fit in more physical activity by taking the stairs instead of the elevator or parking farther away from stores. Ask your healthcare provider about the best exercise plan for you.      © Copyright [employer] 2018 Information is for End User's use only and may not be sold, redistributed or otherwise used for commercial purposes. All illustrations and images included in CareNotes® are the copyrighted property of A.D.A[employer]M., Inc[employer] or [employer]

## 2025-04-17 NOTE — ASSESSMENT & PLAN NOTE
Vitals reviewed including trends  BP remains controlled at this time  Patient reports no side effects from current medication regiment  Plan to continue current medication and dosing   Discussed lifestyle modifications that could be beneficial including low salt diet, increase physical activity and weight lose   Continue with interval labs as indicated

## 2025-04-17 NOTE — PROGRESS NOTES
Name: Ivonne Marcos      : 1955      MRN: [id number]  Encounter Provider: Alvino German MD  Encounter Date: 2025   Encounter department: [facility] PRIMARY CARE  :  Assessment & Plan  Medicare annual wellness visit, initial         Patient refusing DXA, Mammo despite extensive education   Primary hypertension  Vitals reviewed including trends  BP remains controlled at this time  Patient reports no side effects from current medication regiment  Plan to continue current medication and dosing   Discussed lifestyle modifications that could be beneficial including low salt diet, increase physical activity and weight lose   Continue with interval labs as indicated             Preventive health issues were discussed with patient, and age appropriate screening tests were ordered as noted in patient's After Visit Summary. Personalized health advice and appropriate referrals for health education or preventive services given if needed, as noted in patient's After Visit Summary.    History of Present Illness     HPI   Patient Care Team:  Alvino German MD as PCP - General (Family Medicine)    Review of Systems   Constitutional:  Negative for activity change, appetite change, chills, fatigue and fever.   HENT:  Negative for congestion, dental problem, drooling, ear discharge, ear pain, facial swelling, postnasal drip, rhinorrhea and sinus pain.    Eyes:  Negative for photophobia, pain, discharge and itching.   Respiratory:  Negative for apnea, cough, chest tightness and shortness of breath.    Cardiovascular:  Negative for chest pain and leg swelling.   Gastrointestinal:  Negative for abdominal distention, abdominal pain, anal bleeding, constipation, diarrhea and nausea.   Endocrine: Negative for cold intolerance, heat intolerance and polydipsia.   Genitourinary:  Negative for difficulty urinating.   Musculoskeletal:  Negative for arthralgias, gait problem, joint swelling and myalgias.   Skin:   Negative for color change and pallor.   Allergic/Immunologic: Negative for immunocompromised state.   Neurological:  Negative for dizziness, seizures, facial asymmetry, weakness, light-headedness, numbness and headaches.   Psychiatric/Behavioral:  Negative for agitation, behavioral problems, confusion, decreased concentration and dysphoric mood.    All other systems reviewed and are negative.    Medical History Reviewed by provider this encounter:  [facility]       Annual Wellness Visit Questionnaire   Ivonne is here for her Subsequent Wellness visit. Last Medicare Wellness visit information reviewed, patient interviewed, no change since last AWV.     Health Risk Assessment:   Patient rates overall health as good. Patient feels that their physical health rating is slightly better. Patient is very satisfied with their life. Eyesight was rated as same. Hearing was rated as same. Patient feels that their emotional and mental health rating is same. Patients states they are never, rarely angry. Patient states they are never, rarely unusually tired/fatigued. Pain experienced in the last 7 days has been none. Patient states that she has experienced no weight loss or gain in last 6 months.     Depression Screening:   PHQ-2 Score: 0      Fall Risk Screening:   In the past year, patient has experienced: no history of falling in past year      Urinary Incontinence Screening:   Patient has not leaked urine accidently in the last six months.     Home Safety:  Patient does not have trouble with stairs inside or outside of their home. Patient has working smoke alarms and has working carbon monoxide detector. Home safety hazards include: none.     Nutrition:   Current diet is Regular.     Medications:   Patient is not currently taking any over-the-counter supplements. Patient is able to manage medications.     Activities of Daily Living (ADLs)/Instrumental Activities of Daily Living (IADLs):   Walk and transfer into and out of bed and  chair?: Yes  Dress and groom yourself?: Yes    Bathe or shower yourself?: Yes    Feed yourself? Yes  Do your laundry/housekeeping?: Yes  Manage your money, pay your bills and track your expenses?: Yes  Make your own meals?: Yes    Do your own shopping?: Yes    Previous Hospitalizations:   Any hospitalizations or ED visits within the last 12 months?: No      Advance Care Planning:   Living will: No    Durable POA for healthcare: No    Advanced directive: No      Preventive Screenings      Cardiovascular Screening:    General: Screening Not Indicated and History Lipid Disorder      Diabetes Screening:     General: Screening Current      Cervical Cancer Screening:    General: Screening Not Indicated      Lung Cancer Screening:     General: Screening Not Indicated      Hepatitis C Screening:    General: Screening Current    Immunizations:  - Immunizations due: Influenza and Zoster (Shingrix)    Screening, Brief Intervention, and Referral to Treatment (SBIRT)     Screening  Typical number of drinks in a day: 0  Typical number of drinks in a week: 1  Interpretation: Low risk drinking behavior.    AUDIT-C Screenin) How often did you have a drink containing alcohol in the past year? monthly or less  2) How many drinks did you have on a typical day when you were drinking in the past year? 1 to 2  3) How often did you have 6 or more drinks on one occasion in the past year? never    AUDIT-C Score: 1  Interpretation: Score 0-2 (female): Negative screen for alcohol misuse    Single Item Drug Screening:  How often have you used an illegal drug (including marijuana) or a prescription medication for non-medical reasons in the past year? never    Single Item Drug Screen Score: 0  Interpretation: Negative screen for possible drug use disorder    Social Drivers of Health     Food Insecurity: No Food Insecurity (2025)    Hunger Vital Sign     Worried About Running Out of Food in the Last Year: Never true     Ran Out of Food  "in the Last Year: Never true   Transportation Needs: No Transportation Needs (4/14/2025)    PRAPARE - Transportation     Lack of Transportation (Medical): No     Lack of Transportation (Non-Medical): No   Housing Stability: Low Risk  (4/14/2025)    Housing Stability Vital Sign     Unable to Pay for Housing in the Last Year: No     Number of Times Moved in the Last Year: 0     Homeless in the Last Year: No   Utilities: Not At Risk (4/14/2025)    [facility] Utilities     Threatened with loss of utilities: No     No results found.    Objective   /72 (BP Location: Left arm, Patient Position: Sitting, Cuff Size: Large)   Pulse 73   Temp 97.7 °F (36.5 °C) (Tympanic)   Ht 5' 4\" (1.626 m)   Wt 117 kg (259 lb)   SpO2 98%   BMI 44.46 kg/m²     Physical Exam  Vitals and nursing note reviewed.   Constitutional:       General: She is not in acute distress.     Appearance: She is well-developed.   HENT:      Head: Normocephalic and atraumatic.   Eyes:      Conjunctiva/sclera: Conjunctivae normal.      Pupils: Pupils are equal, round, and reactive to light.   Cardiovascular:      Rate and Rhythm: Normal rate and regular rhythm.      Heart sounds: Normal heart sounds. No murmur heard.     No friction rub.   Pulmonary:      Effort: Pulmonary effort is normal.      Breath sounds: Normal breath sounds.   Abdominal:      General: Bowel sounds are normal.      Palpations: Abdomen is soft.   Musculoskeletal:         General: Normal range of motion.      Cervical back: Normal range of motion and neck supple.   Skin:     General: Skin is warm.      Capillary Refill: Capillary refill takes less than 2 seconds.   Neurological:      Mental Status: She is alert and oriented to person, place, and time.      Motor: No abnormal muscle tone.      Coordination: Coordination normal.   Psychiatric:         Behavior: Behavior normal.         Thought Content: Thought content normal.         "

## (undated) DEVICE — COTTON TIP APPLICTOR 2 PK

## (undated) DEVICE — INTENDED FOR TISSUE SEPARATION, AND OTHER PROCEDURES THAT REQUIRE A SHARP SURGICAL BLADE TO PUNCTURE OR CUT.: Brand: BARD-PARKER SAFETY BLADES SIZE 15, STERILE

## (undated) DEVICE — INSUFFLATION NEEDLE TO ESTABLISH PNEUMOPERITONEUM.: Brand: INSUFFLATION NEEDLE

## (undated) DEVICE — CHLORAPREP HI-LITE 26ML ORANGE

## (undated) DEVICE — HARMONIC 1100 SHEARS, 36CM SHAFT LENGTH: Brand: HARMONIC

## (undated) DEVICE — SUT VICRYL 4-0 PS-2 27 IN J426H

## (undated) DEVICE — 3M™ STERI-STRIP™ REINFORCED ADHESIVE SKIN CLOSURES, R1547, 1/2 IN X 4 IN (12 MM X 100 MM), 6 STRIPS/ENVELOPE: Brand: 3M™ STERI-STRIP™

## (undated) DEVICE — TROCAR: Brand: KII FIOS FIRST ENTRY

## (undated) DEVICE — LIGHT HANDLE COVER SLEEVE DISP BLUE STELLAR

## (undated) DEVICE — GLOVE INDICATOR PI UNDERGLOVE SZ 7.5 BLUE

## (undated) DEVICE — PAD GROUNDING DUAL ADULT

## (undated) DEVICE — TUBING SMOKE EVAC W/FILTRATION DEVICE PLUMEPORT ACTIV

## (undated) DEVICE — ALLENTOWN LAP CHOLE APP PACK: Brand: CARDINAL HEALTH

## (undated) DEVICE — PLEDGET CARDIO PTFE 9.5 X 4.8 SOFT LF (6EA/PK)

## (undated) DEVICE — [HIGH FLOW INSUFFLATOR,  DO NOT USE IF PACKAGE IS DAMAGED,  KEEP DRY,  KEEP AWAY FROM SUNLIGHT,  PROTECT FROM HEAT AND RADIOACTIVE SOURCES.]: Brand: PNEUMOSURE

## (undated) DEVICE — TOWEL SURG XR DETECT GREEN STRL RFD

## (undated) DEVICE — JACKSON-PRATT 100CC BULB RESERVOIR: Brand: CARDINAL HEALTH

## (undated) DEVICE — HYDROPHILIC WOUND DRESSING WITH ZINC PLUS VITAMINS A AND B6.: Brand: DERMAGRAN®-B

## (undated) DEVICE — 3M™ TEGADERM™ TRANSPARENT FILM DRESSING FRAME STYLE, 1624W, 2-3/8 IN X 2-3/4 IN (6 CM X 7 CM), 100/CT 4CT/CASE: Brand: 3M™ TEGADERM™

## (undated) DEVICE — SCD SEQUENTIAL COMPRESSION COMFORT SLEEVE MEDIUM KNEE LENGTH: Brand: KENDALL SCD

## (undated) DEVICE — DRESSING MEPILEX AG BORDER POST-OP 4 X 12 IN

## (undated) DEVICE — DRAPE EQUIPMENT RF WAND

## (undated) DEVICE — GAUZE SPONGES,8 PLY: Brand: CURITY

## (undated) DEVICE — INTERCEED

## (undated) DEVICE — DRAIN SPONGES,6 PLY: Brand: EXCILON

## (undated) DEVICE — NEPTUNE E-SEP SMOKE EVACUATION PENCIL, COATED, 70MM BLADE, PUSH BUTTON SWITCH: Brand: NEPTUNE E-SEP

## (undated) DEVICE — JP PERF DRN SIL FLT 10MM FULL: Brand: CARDINAL HEALTH

## (undated) DEVICE — SUT VICRYL 0 UR-6 27 IN J603H

## (undated) DEVICE — 4-PORT MANIFOLD: Brand: NEPTUNE 2

## (undated) DEVICE — GLOVE SRG BIOGEL ECLIPSE 7.5

## (undated) DEVICE — 3M™ STERI-STRIP™ REINFORCED ADHESIVE SKIN CLOSURES, R1546, 1/4 IN X 4 IN (6 MM X 100 MM), 10 STRIPS/ENVELOPE: Brand: 3M™ STERI-STRIP™